# Patient Record
Sex: FEMALE | Race: WHITE | NOT HISPANIC OR LATINO | Employment: OTHER | URBAN - METROPOLITAN AREA
[De-identification: names, ages, dates, MRNs, and addresses within clinical notes are randomized per-mention and may not be internally consistent; named-entity substitution may affect disease eponyms.]

---

## 2019-12-05 ENCOUNTER — TRANSCRIBE ORDERS (OUTPATIENT)
Dept: ADMINISTRATIVE | Facility: HOSPITAL | Age: 64
End: 2019-12-05

## 2019-12-05 DIAGNOSIS — Z12.39 SCREENING BREAST EXAMINATION: Primary | ICD-10-CM

## 2020-01-07 ENCOUNTER — HOSPITAL ENCOUNTER (OUTPATIENT)
Dept: MAMMOGRAPHY | Facility: HOSPITAL | Age: 65
Discharge: HOME | End: 2020-01-07
Admitting: NURSE PRACTITIONER

## 2020-01-07 DIAGNOSIS — Z12.39 SCREENING BREAST EXAMINATION: ICD-10-CM

## 2020-01-07 PROCEDURE — 77067 SCR MAMMO BI INCL CAD: CPT

## 2020-01-07 PROCEDURE — 77063 BREAST TOMOSYNTHESIS BI: CPT

## 2021-09-16 ENCOUNTER — TRANSCRIBE ORDERS (OUTPATIENT)
Dept: ADMINISTRATIVE | Facility: HOSPITAL | Age: 66
End: 2021-09-16

## 2021-09-16 DIAGNOSIS — Z12.31 VISIT FOR SCREENING MAMMOGRAM: Primary | ICD-10-CM

## 2021-09-23 ENCOUNTER — HOSPITAL ENCOUNTER (OUTPATIENT)
Dept: MAMMOGRAPHY | Facility: HOSPITAL | Age: 66
Discharge: HOME | End: 2021-09-23
Admitting: NURSE PRACTITIONER

## 2021-09-23 DIAGNOSIS — Z12.31 VISIT FOR SCREENING MAMMOGRAM: ICD-10-CM

## 2021-09-23 PROCEDURE — 77067 SCR MAMMO BI INCL CAD: CPT

## 2021-09-23 PROCEDURE — 77063 BREAST TOMOSYNTHESIS BI: CPT

## 2022-09-12 ENCOUNTER — TRANSCRIBE ORDERS (OUTPATIENT)
Dept: ADMINISTRATIVE | Facility: HOSPITAL | Age: 67
End: 2022-09-12

## 2022-09-12 DIAGNOSIS — Z12.31 ENCOUNTER FOR SCREENING MAMMOGRAM FOR MALIGNANT NEOPLASM OF BREAST: Primary | ICD-10-CM

## 2022-09-26 ENCOUNTER — HOSPITAL ENCOUNTER (OUTPATIENT)
Dept: MAMMOGRAPHY | Facility: HOSPITAL | Age: 67
Discharge: HOME | End: 2022-09-26
Admitting: NURSE PRACTITIONER

## 2022-09-26 DIAGNOSIS — Z12.31 ENCOUNTER FOR SCREENING MAMMOGRAM FOR MALIGNANT NEOPLASM OF BREAST: ICD-10-CM

## 2022-09-26 PROCEDURE — 77067 SCR MAMMO BI INCL CAD: CPT

## 2022-09-26 PROCEDURE — 77063 BREAST TOMOSYNTHESIS BI: CPT

## 2024-11-14 ENCOUNTER — HOSPITAL ENCOUNTER (OUTPATIENT)
Dept: CT IMAGING | Facility: HOSPITAL | Age: 69
Discharge: HOME | End: 2024-11-14
Admitting: NURSE PRACTITIONER
Payer: MEDICARE

## 2024-11-14 ENCOUNTER — TRANSCRIBE ORDERS (OUTPATIENT)
Dept: ADMINISTRATIVE | Facility: HOSPITAL | Age: 69
End: 2024-11-14
Payer: MEDICARE

## 2024-11-14 DIAGNOSIS — R59.0 LYMPHADENOPATHY, AXILLARY: Primary | ICD-10-CM

## 2024-11-14 DIAGNOSIS — R59.0 LYMPHADENOPATHY, AXILLARY: ICD-10-CM

## 2024-11-14 LAB
CREAT BLDA-MCNC: 0.9 MG/DL (ref 0.6–1.3)
EGFRCR SERPLBLD CKD-EPI 2021: 69.3 ML/MIN/1.73

## 2024-11-14 PROCEDURE — 74178 CT ABD&PLV WO CNTR FLWD CNTR: CPT

## 2024-11-14 PROCEDURE — 82565 ASSAY OF CREATININE: CPT

## 2024-11-14 PROCEDURE — 25510000001 IOPAMIDOL PER 1 ML: Performed by: NURSE PRACTITIONER

## 2024-11-14 PROCEDURE — 71260 CT THORAX DX C+: CPT

## 2024-11-14 RX ORDER — IOPAMIDOL 755 MG/ML
100 INJECTION, SOLUTION INTRAVASCULAR
Status: COMPLETED | OUTPATIENT
Start: 2024-11-14 | End: 2024-11-14

## 2024-11-14 RX ADMIN — IOPAMIDOL 100 ML: 755 INJECTION, SOLUTION INTRAVENOUS at 14:22

## 2024-11-18 NOTE — PROGRESS NOTES
HEMATOLOGY ONCOLOGY OUTPATIENT CONSULTATION       Patient name: Catarina Mclean  : 1955  MRN: [id number]  Primary Care Physician: Bozena Alamo NP  Referring Physician: Bozena Alamo NP  Reason For Consult:       History of Present Illness:  Patient is a 69-year-old female who has been referred to us for further evaluation and management of diffuse lymphadenopathy.    She reported having symptoms of worsening fatigue, poor appetite intermittent night sweats and palpable axillary adenopathy approximately 2 months ago.  She was seen by her PCP for the symptoms and was empirically treated with oral antibiotics with limited improvement in her symptoms.      Bilateral breast screening mammogram in 2024 was reported unremarkable.  [BI-RADS 1]    Subsequently a CT chest abdomen pelvis were ordered and is reported as follows:  2024: CT chest/abdomen/pelvis:  Impression:  1.Supra diaphragmatic and infra diaphragmatic lymphadenopathy concerning for lymphoma.     Her past medical history significant for non-Hodgkin lymphoma [likely DLBCL], diagnosed in -10  She reported that she was being followed by Dr. Barkley and had systemic therapy for lymphoma in [id number].  She did not follow-up with oncologist thereafter and was lost to follow-up.  Patient is unable to provide any additional details about her diagnosis or treatment.    Limited prior medical records from  were reviewed:    Patient underwent bilateral tonsillectomy in 2009,  Pathology: Left tonsil was reported to have involvement from malignant lymphoma, large cell type of B-cell origin.  [Of activated B-cell phenotype].  IHC staining was reported positive for CD45, CD20, Bcl-2 and MUM1 with Ki-67 98%.  The tumor was negative for BEV.    A bone marrow biopsy was reported negative for lymphoma involvement at that time.    Patient stated that she is up-to-date whether age-appropriate cancer screening  "including annual screening mammograms, Pap smears and colonoscopies.  Last colonoscopy was approximately 10 years ago and was reported normal per patient.    Family history significant for unknown cancer in brother who has been recently diagnosed..    Subjective:  Patient presents for initial consultation today.  She reported having symptoms of generalized fatigue, poor appetite and palpable axillary adenopathy in right axilla and right supraclavicular area for last few weeks.  Denied any other respiratory or GI/ symptoms.  She reported having occasional night sweats but denied any weight loss or fever/chills.  No recent infections reported.      Past Medical History:   Diagnosis Date    Drug therapy        Past Surgical History:   Procedure Laterality Date    BREAST BIOPSY      HYSTERECTOMY         No current outpatient medications on file.    No Known Allergies    Family History   Problem Relation Age of Onset    Breast cancer Maternal Aunt        Cancer-related family history includes Breast cancer in her maternal aunt.         Social History     Social History Narrative    Not on file       ROS:   Review of Systems   Constitutional:  Positive for activity change, appetite change and fatigue.   HENT: Negative.     Eyes: Negative.    Respiratory: Negative.     Cardiovascular: Negative.    Gastrointestinal: Negative.    Endocrine: Negative.    Genitourinary: Negative.    Musculoskeletal: Negative.    Skin: Negative.    Allergic/Immunologic: Negative.    Neurological: Negative.    Hematological: Negative.    Psychiatric/Behavioral: Negative.           Objective:    Vital Signs:  Vitals:    11/20/24 0954   BP: 136/77   Pulse: 61   Temp: 97.9 °F (36.6 °C)   TempSrc: Oral   SpO2: 94%   Weight: 79.9 kg (176 lb 3.2 oz)   Height: 134.6 cm (52.99\")   PainSc: 0-No pain     Body mass index is 44.12 kg/m².    ECOG  (1) Restricted in physically strenuous activity, ambulatory and able to do work of light nature    Physical " "Exam:   Physical Exam  Constitutional:       Appearance: Normal appearance. She is normal weight.   HENT:      Head: Normocephalic and atraumatic.      Right Ear: External ear normal.      Left Ear: External ear normal.      Nose: Nose normal.      Mouth/Throat:      Mouth: Mucous membranes are moist.      Pharynx: Oropharynx is clear.   Eyes:      Extraocular Movements: Extraocular movements intact.      Conjunctiva/sclera: Conjunctivae normal.      Pupils: Pupils are equal, round, and reactive to light.   Cardiovascular:      Rate and Rhythm: Normal rate.      Pulses: Normal pulses.   Pulmonary:      Effort: Pulmonary effort is normal.   Abdominal:      General: Abdomen is flat.      Palpations: Abdomen is soft.   Musculoskeletal:         General: Normal range of motion.      Cervical back: Normal range of motion and neck supple.   Lymphadenopathy:      Upper Body:      Right upper body: Supraclavicular adenopathy and axillary adenopathy present.   Skin:     General: Skin is warm.   Neurological:      General: No focal deficit present.      Mental Status: She is alert and oriented to person, place, and time.   Psychiatric:         Mood and Affect: Mood normal.         Behavior: Behavior normal.         Thought Content: Thought content normal.         Judgment: Judgment normal.         Lab Results - Last 18 Months   Lab Units 11/20/24  1116   WBC 10*3/mm3 5.07   HEMOGLOBIN g/dL 13.4   HEMATOCRIT % 41.6   PLATELETS 10*3/mm3 153   MCV fL 87.9     Lab Results - Last 18 Months   Lab Units 11/14/24  1320   CREATININE mg/dL 0.90       Lab Results   Component Value Date    CREATININE 0.90 11/14/2024       No results for input(s): \"APTT\", \"INR\", \"PTT\" in the last 47661 hours.    No results found for: \"IRON\", \"TIBC\", \"FERRITIN\"    No results found for: \"FOLATE\"    No results found for: \"OCCULTBLD\"    No results found for: \"RETICCTPCT\"  No results found for: \"FUBLTBGA45\"  No results found for: \"SPEP\", \"UPEP\"  No results " "found for: \"LDH\", \"URICACID\"  No results found for: \"OTTONIEL\", \"RF\", \"SEDRATE\"  No results found for: \"FIBRINOGEN\", \"HAPTOGLOBIN\"  No results found for: \"PTT\", \"INR\"  No results found for: \"\"  No results found for: \"CEA\"  No components found for: \"CA-19-9\"  No results found for: \"PSA\"  No results found for: \"SEDRATE\"       Assessment & Plan     Generalized lymphadenopathy:  History of non-Hodgkin lymphoma:  -Medical records reviewed as above.  Patient has remote history of aggressive NHL, status post chemotherapy in 2009-10.  -CT chest/abdomen/pelvis reviewed, noted to have extensive lymphadenopathy involving supraclavicular, axillary and intra-abdominal lymph nodes.  No solid organ masses noted concerning for solid organ metastasis.  -Discussed with patient regarding likely diagnosis of lymphoma given her medical history and further steps in management.  -Will check labs today.  Ordered PET scan for further evaluation.  -Patient is scheduled for axillary lymph node biopsy tomorrow.  Will await further results.      2-week follow-up with biopsy and imaging findings.  Sooner as needed.      Thank you very much for providing the opportunity to participate in this patient’s care. Please do not hesitate to call if there are any other questions.    "

## 2024-11-20 ENCOUNTER — CONSULT (OUTPATIENT)
Dept: ONCOLOGY | Facility: CLINIC | Age: 69
End: 2024-11-20
Payer: MEDICARE

## 2024-11-20 ENCOUNTER — LAB (OUTPATIENT)
Dept: LAB | Facility: HOSPITAL | Age: 69
End: 2024-11-20
Payer: MEDICARE

## 2024-11-20 VITALS
HEART RATE: 61 BPM | BODY MASS INDEX: 40.78 KG/M2 | HEIGHT: 55 IN | SYSTOLIC BLOOD PRESSURE: 136 MMHG | WEIGHT: 176.2 LBS | DIASTOLIC BLOOD PRESSURE: 77 MMHG | OXYGEN SATURATION: 94 % | TEMPERATURE: 97.9 F

## 2024-11-20 DIAGNOSIS — R59.0 AXILLARY LYMPHADENOPATHY: ICD-10-CM

## 2024-11-20 DIAGNOSIS — C83.31 DIFFUSE LARGE B-CELL LYMPHOMA, LYMPH NODES OF HEAD, FACE, AND NECK: ICD-10-CM

## 2024-11-20 DIAGNOSIS — R59.1 LYMPHADENOPATHY: ICD-10-CM

## 2024-11-20 DIAGNOSIS — K75.9 HEPATITIS: ICD-10-CM

## 2024-11-20 DIAGNOSIS — Z85.72 HISTORY OF NON-HODGKIN'S LYMPHOMA: ICD-10-CM

## 2024-11-20 DIAGNOSIS — R59.1 LYMPHADENOPATHY: Primary | ICD-10-CM

## 2024-11-20 LAB
ALBUMIN SERPL-MCNC: 4.3 G/DL (ref 3.5–5.2)
ALBUMIN/GLOB SERPL: 1.5 G/DL
ALP SERPL-CCNC: 88 U/L (ref 39–117)
ALT SERPL W P-5'-P-CCNC: 15 U/L (ref 1–33)
ANION GAP SERPL CALCULATED.3IONS-SCNC: 10.5 MMOL/L (ref 5–15)
AST SERPL-CCNC: 24 U/L (ref 1–32)
BASOPHILS # BLD AUTO: 0.03 10*3/MM3 (ref 0–0.2)
BASOPHILS NFR BLD AUTO: 0.6 % (ref 0–1.5)
BILIRUB SERPL-MCNC: 0.8 MG/DL (ref 0–1.2)
BUN SERPL-MCNC: 8 MG/DL (ref 8–23)
BUN/CREAT SERPL: 8.8 (ref 7–25)
CALCIUM SPEC-SCNC: 10.7 MG/DL (ref 8.6–10.5)
CHLORIDE SERPL-SCNC: 102 MMOL/L (ref 98–107)
CO2 SERPL-SCNC: 27.5 MMOL/L (ref 22–29)
CREAT SERPL-MCNC: 0.91 MG/DL (ref 0.57–1)
DEPRECATED RDW RBC AUTO: 42.3 FL (ref 37–54)
EGFRCR SERPLBLD CKD-EPI 2021: 68.4 ML/MIN/1.73
EOSINOPHIL # BLD AUTO: 0.14 10*3/MM3 (ref 0–0.4)
EOSINOPHIL NFR BLD AUTO: 2.8 % (ref 0.3–6.2)
ERYTHROCYTE [DISTWIDTH] IN BLOOD BY AUTOMATED COUNT: 13.6 % (ref 12.3–15.4)
ERYTHROCYTE [SEDIMENTATION RATE] IN BLOOD: 8 MM/HR (ref 0–30)
GLOBULIN UR ELPH-MCNC: 2.9 GM/DL
GLUCOSE SERPL-MCNC: 95 MG/DL (ref 65–99)
HAV IGM SERPL QL IA: NORMAL
HBV CORE IGM SERPL QL IA: NORMAL
HBV SURFACE AG SERPL QL IA: NORMAL
HCT VFR BLD AUTO: 41.6 % (ref 34–46.6)
HCV AB SER QL: NORMAL
HGB BLD-MCNC: 13.4 G/DL (ref 12–15.9)
LDH SERPL-CCNC: 310 U/L (ref 135–214)
LYMPHOCYTES # BLD AUTO: 1.16 10*3/MM3 (ref 0.7–3.1)
LYMPHOCYTES NFR BLD AUTO: 22.9 % (ref 19.6–45.3)
MCH RBC QN AUTO: 28.3 PG (ref 26.6–33)
MCHC RBC AUTO-ENTMCNC: 32.2 G/DL (ref 31.5–35.7)
MCV RBC AUTO: 87.9 FL (ref 79–97)
MONOCYTES # BLD AUTO: 0.54 10*3/MM3 (ref 0.1–0.9)
MONOCYTES NFR BLD AUTO: 10.7 % (ref 5–12)
NEUTROPHILS NFR BLD AUTO: 3.2 10*3/MM3 (ref 1.7–7)
NEUTROPHILS NFR BLD AUTO: 63 % (ref 42.7–76)
PLATELET # BLD AUTO: 153 10*3/MM3 (ref 140–450)
PMV BLD AUTO: 10.2 FL (ref 6–12)
POTASSIUM SERPL-SCNC: 4.1 MMOL/L (ref 3.5–5.2)
PROT SERPL-MCNC: 7.2 G/DL (ref 6–8.5)
RBC # BLD AUTO: 4.73 10*6/MM3 (ref 3.77–5.28)
SODIUM SERPL-SCNC: 140 MMOL/L (ref 136–145)
WBC NRBC COR # BLD AUTO: 5.07 10*3/MM3 (ref 3.4–10.8)

## 2024-11-20 PROCEDURE — 80053 COMPREHEN METABOLIC PANEL: CPT | Performed by: STUDENT IN AN ORGANIZED HEALTH CARE EDUCATION/TRAINING PROGRAM

## 2024-11-20 PROCEDURE — 83615 LACTATE (LD) (LDH) ENZYME: CPT | Performed by: STUDENT IN AN ORGANIZED HEALTH CARE EDUCATION/TRAINING PROGRAM

## 2024-11-20 PROCEDURE — 85652 RBC SED RATE AUTOMATED: CPT | Performed by: STUDENT IN AN ORGANIZED HEALTH CARE EDUCATION/TRAINING PROGRAM

## 2024-11-20 PROCEDURE — 80074 ACUTE HEPATITIS PANEL: CPT | Performed by: STUDENT IN AN ORGANIZED HEALTH CARE EDUCATION/TRAINING PROGRAM

## 2024-11-20 PROCEDURE — 36415 COLL VENOUS BLD VENIPUNCTURE: CPT

## 2024-11-20 PROCEDURE — 85025 COMPLETE CBC W/AUTO DIFF WBC: CPT

## 2024-11-20 RX ORDER — TRAZODONE HYDROCHLORIDE 50 MG/1
50 TABLET, FILM COATED ORAL NIGHTLY
COMMUNITY
Start: 2024-11-08

## 2024-11-20 RX ORDER — PRAVASTATIN SODIUM 20 MG
20 TABLET ORAL DAILY
COMMUNITY
Start: 2024-10-22

## 2024-11-20 RX ORDER — AMLODIPINE BESYLATE 5 MG/1
5 TABLET ORAL DAILY
COMMUNITY
Start: 2024-10-31

## 2024-11-20 RX ORDER — AMOXICILLIN 875 MG/1
TABLET, COATED ORAL
COMMUNITY
Start: 2024-10-17 | End: 2024-11-21

## 2024-11-20 RX ORDER — LISINOPRIL 20 MG/1
20 TABLET ORAL DAILY
COMMUNITY
Start: 2024-10-31

## 2024-11-20 RX ORDER — ASPIRIN 81 MG/1
TABLET, CHEWABLE ORAL DAILY
COMMUNITY

## 2024-11-21 ENCOUNTER — HOSPITAL ENCOUNTER (OUTPATIENT)
Dept: CT IMAGING | Facility: HOSPITAL | Age: 69
Discharge: HOME | End: 2024-11-21
Admitting: NURSE PRACTITIONER
Payer: MEDICARE

## 2024-11-21 VITALS — HEIGHT: 63 IN | WEIGHT: 170 LBS | BODY MASS INDEX: 30.12 KG/M2

## 2024-11-21 DIAGNOSIS — R59.0 LYMPHADENOPATHY, AXILLARY: ICD-10-CM

## 2024-11-21 LAB
ANA HOMOGEN TITR SER: ABNORMAL {TITER}
ANA SER QL IF: POSITIVE
CMV IGG SERPL IA-ACNC: >10 U/ML (ref 0–0.59)
CMV IGM SERPL IA-ACNC: <30 AU/ML (ref 0–29.9)
EBV VCA IGG SER IA-ACNC: 245 U/ML (ref 0–17.9)
EBV VCA IGM SER IA-ACNC: <36 U/ML (ref 0–35.9)
LAB AP CASE REPORT: NORMAL
Lab: ABNORMAL
Lab: NORMAL

## 2024-11-21 PROCEDURE — 77012 CT SCAN FOR NEEDLE BIOPSY: CPT

## 2024-11-21 PROCEDURE — 25010000002 LIDOCAINE 1 % SOLUTION

## 2024-11-21 RX ORDER — LIDOCAINE HYDROCHLORIDE 10 MG/ML
INJECTION, SOLUTION INFILTRATION; PERINEURAL AS NEEDED
Status: COMPLETED | OUTPATIENT
Start: 2024-11-21 | End: 2024-11-21

## 2024-11-21 RX ORDER — CHLORAL HYDRATE 500 MG
2000 CAPSULE ORAL
COMMUNITY

## 2024-11-21 RX ADMIN — LIDOCAINE HYDROCHLORIDE 10 ML: 10 INJECTION, SOLUTION INFILTRATION; PERINEURAL at 13:34

## 2024-11-21 NOTE — DISCHARGE INSTRUCTIONS
KEEP DRESSING ON FOR THE NEXT 48 HOURS. YOU MAY THEN REMOVE IT AND SHOWER AS USUAL. NO HEAVY LIFTING GREATER THAN 10 POUNDS FOR THE NEXT 24 HOURS. WATCH FOR SIGNS AND SYMPTOMS OF INFECTION SUCH AS REDNESS, SWELLING, DRAINAGE, OR UNEXPLAINED FEVER. IF ANY SYMPTOMS ARISE. SEE YOUR PHYSICIAN. FOLLOW UP WITH NITIN PATTON,  FOR YOUR BIOPSY RESULTS IN ABOUT A WEEK. ANY QUESTIONS, YOU CAN CALL THE [facility] -[phone], M-F 8-4:30. IF AFTER HOURS, FOLLOW PROMPTS ON PHONE.    IF YOU DEVELOP ANY PAIN OR DISCOMFORT WHILE AT HOME TODAY, YOU MAY APPLY AN ICE PACK, WITH A BARRIER BETWEEN THE SKIN AND THE ICE, FOR 20 MINUTES ON AND THEN 20 MINUTES OFF. YOU MAY ALSO TAKE SOME EXTRA STRENGTH TYLENOL IF NEED BE.

## 2024-11-22 LAB
GAMMA INTERFERON BACKGROUND BLD IA-ACNC: 0.02 IU/ML
Lab: NORMAL
Lab: NORMAL
M TB IFN-G BLD-IMP: NEGATIVE
M TB IFN-G CD4+ BCKGRND COR BLD-ACNC: 0.06 IU/ML
M TB IFN-G CD4+CD8+ BCKGRND COR BLD-ACNC: 0.05 IU/ML
MITOGEN IGNF BCKGRD COR BLD-ACNC: >10 IU/ML
QUANTIFERON INCUBATION: NORMAL
SERVICE CMNT-IMP: NORMAL

## 2024-11-25 LAB — REF LAB TEST METHOD: NORMAL

## 2024-11-27 LAB
AGGRESSIVE B-CELL LYMPHOMA TARGETGENE PANEL RESULT: NORMAL
CONSULT RESULT: NORMAL
TARGETGENE RESULT: NORMAL

## 2024-12-02 LAB
LAB AP CASE REPORT: NORMAL
LAB AP FLOW CYTOMETRY SUMMARY: NORMAL
Lab: NORMAL
PATH REPORT.FINAL DX SPEC: NORMAL
PATH REPORT.GROSS SPEC: NORMAL

## 2024-12-03 ENCOUNTER — HOSPITAL ENCOUNTER (OUTPATIENT)
Dept: PET IMAGING | Facility: HOSPITAL | Age: 69
Discharge: HOME | End: 2024-12-03
Payer: MEDICARE

## 2024-12-03 DIAGNOSIS — R59.0 AXILLARY LYMPHADENOPATHY: ICD-10-CM

## 2024-12-03 DIAGNOSIS — K75.9 HEPATITIS: ICD-10-CM

## 2024-12-03 DIAGNOSIS — R59.1 LYMPHADENOPATHY: ICD-10-CM

## 2024-12-03 DIAGNOSIS — Z85.72 HISTORY OF NON-HODGKIN'S LYMPHOMA: ICD-10-CM

## 2024-12-03 DIAGNOSIS — C83.31 DIFFUSE LARGE B-CELL LYMPHOMA, LYMPH NODES OF HEAD, FACE, AND NECK: ICD-10-CM

## 2024-12-03 LAB — GLUCOSE BLDC GLUCOMTR-MCNC: 107 MG/DL (ref 70–105)

## 2024-12-03 PROCEDURE — A9552 F18 FDG: HCPCS | Performed by: STUDENT IN AN ORGANIZED HEALTH CARE EDUCATION/TRAINING PROGRAM

## 2024-12-03 PROCEDURE — 34310000005 FLUDEOXYGLUCOSE F18 SOLUTION: Performed by: STUDENT IN AN ORGANIZED HEALTH CARE EDUCATION/TRAINING PROGRAM

## 2024-12-03 PROCEDURE — 78815 PET IMAGE W/CT SKULL-THIGH: CPT

## 2024-12-03 PROCEDURE — 82948 REAGENT STRIP/BLOOD GLUCOSE: CPT

## 2024-12-03 RX ADMIN — FLUDEOXYGLUCOSE F 18 1 DOSE: 200 INJECTION, SOLUTION INTRAVENOUS at 12:21

## 2024-12-06 NOTE — PROGRESS NOTES
HEMATOLOGY ONCOLOGY OUTPATIENT CONSULTATION       Patient name: Catarina Mclean  : 1955  MRN: [id number]  Primary Care Physician: Bozena Alamo NP  Referring Physician: Bozena Alamo NP  Reason For Consult:       History of Present Illness:  Patient is a 69-year-old female who has been referred to us for further evaluation and management of diffuse lymphadenopathy.    She reported having symptoms of worsening fatigue, poor appetite intermittent night sweats and palpable axillary adenopathy approximately 2 months ago.  She was seen by her PCP for the symptoms and was empirically treated with oral antibiotics with limited improvement in her symptoms.      Bilateral breast screening mammogram in 2024 was reported unremarkable.  [BI-RADS 1]    Subsequently a CT chest abdomen pelvis were ordered and is reported as follows:  2024: CT chest/abdomen/pelvis:  Impression:  1.Supra diaphragmatic and infra diaphragmatic lymphadenopathy concerning for lymphoma.     Her past medical history significant for non-Hodgkin lymphoma [likely DLBCL], diagnosed in -10  She reported that she was being followed by Dr. Barkley and had systemic therapy for lymphoma in [id number].  She did not follow-up with oncologist thereafter and was lost to follow-up.  Patient is unable to provide any additional details about her diagnosis or treatment.    Limited prior medical records from  were reviewed:    Patient underwent bilateral tonsillectomy in 2009,  Pathology: Left tonsil was reported to have involvement from malignant lymphoma, large cell type of B-cell origin.  [Of activated B-cell phenotype].  IHC staining was reported positive for CD45, CD20, Bcl-2 and MUM1 with Ki-67 98%.  The tumor was negative for BEV.    A bone marrow biopsy was reported negative for lymphoma involvement at that time.    Patient stated that she is up-to-date whether age-appropriate cancer screening  including annual screening mammograms, Pap smears and colonoscopies.  Last colonoscopy was approximately 10 years ago and was reported normal per patient.    Family history significant for unknown cancer in brother who has been recently diagnosed..      Patient presents for initial consultation today.  She reported having symptoms of generalized fatigue, poor appetite and palpable axillary adenopathy in right axilla and right supraclavicular area for last few weeks.  Denied any other respiratory or GI/ symptoms.  She reported having occasional night sweats but denied any weight loss or fever/chills.  No recent infections reported.    11/20/24: Lymph node, right axillary:  Large B-cell lymphoma  Immunohistochemistry  There is predominance of intermediate to large sized CD45 and CD20+ B-lymphocytes. CD3 and CD5 reveal numerous small T-lymphocytes. B-cells are negative for CD5, CD10, CD15, CD30, and BEV. They are positive for PAX-5, BCL-2, BCL-6, and MUM-1, as well as c-MYC (~40% of neoplastic cells). Ki-67 shows high proliferative rate (50-60%).    Flow Cytometry: Abnormal/ monotypic B-cell population (4% of sample)  Comment: Scatter properties compatible with increased cell size, cells characterized as:  CD45+, CD19+, CD20+, CD5-, CD10-, CD23-, FMC7-, CD30-, CD38-, CD43-/+, HLA  DR+, sIg lambda+    FISH PANEL: Abnormal Lymphoma FISH panel  Aneuploidy: gain of BCL6/3q, MYC/8q, and BCL2/18q  Additional IGH/14q    12/3/24: PET/CT:  Impression:  1. Hypermetabolic left cervical chain, bilateral supraclavicular, right axillary, right subpectoral, portacaval and retroperitoneal adenopathy consistent with known lymphoma.  2. Two small hypermetabolic splenic lesions likely also related to lymphoma. No splenomegaly.  3. Hypermetabolic osseous uptake associated with C3 vertebral body and right posterior 12th rib likely osseous involvement of lymphoma.  4. Additional incidental CT findings above.    Subjective:  12/10/24:  Patient seen today for follow-up visit to discuss her biopsy and imaging results.  She reported again having ongoing fatigue and decreased appetite.  Denied any new complaints today.    Past Medical History:   Diagnosis Date    Drug therapy     Hyperlipidemia     Hypertension     Non Hodgkin's lymphoma 2009       Past Surgical History:   Procedure Laterality Date    BREAST BIOPSY      HYSTERECTOMY           Current Outpatient Medications:     amLODIPine (NORVASC) 5 MG tablet, Take 1 tablet by mouth Daily., Disp: , Rfl:     aspirin 81 MG chewable tablet, Chew Daily., Disp: , Rfl:     lisinopril (PRINIVIL,ZESTRIL) 20 MG tablet, Take 1 tablet by mouth Daily., Disp: , Rfl:     Omega-3 Fatty Acids (fish oil) 1000 MG capsule capsule, Take 2 capsules by mouth Daily With Breakfast., Disp: , Rfl:     pravastatin (PRAVACHOL) 20 MG tablet, Take 1 tablet by mouth Daily., Disp: , Rfl:     traZODone (DESYREL) 50 MG tablet, Take 1 tablet by mouth Every Night., Disp: , Rfl:     No Known Allergies    Family History   Problem Relation Age of Onset    Liver cancer Brother     Breast cancer Maternal Aunt        Cancer-related family history includes Breast cancer in her maternal aunt; Liver cancer in her brother.      Social History     Tobacco Use    Smoking status: Never   Vaping Use    Vaping status: Never Used   Substance Use Topics    Alcohol use: Yes     Alcohol/week: 10.0 standard drinks of alcohol     Types: 10 Cans of beer per week     Comment: WEEKLY    Drug use: Never     Social History     Social History Narrative    Not on file       ROS:   Review of Systems   Constitutional:  Positive for activity change, appetite change and fatigue.   HENT: Negative.     Eyes: Negative.    Respiratory: Negative.     Cardiovascular: Negative.    Gastrointestinal: Negative.    Endocrine: Negative.    Genitourinary: Negative.    Musculoskeletal: Negative.    Skin: Negative.    Allergic/Immunologic: Negative.    Neurological: Negative.     Hematological: Negative.    Psychiatric/Behavioral: Negative.           Objective:    Vital Signs:  There were no vitals filed for this visit.    There is no height or weight on file to calculate BMI.    ECOG  (1) Restricted in physically strenuous activity, ambulatory and able to do work of light nature    Physical Exam:   Physical Exam  Constitutional:       Appearance: Normal appearance. She is normal weight.   HENT:      Head: Normocephalic and atraumatic.      Right Ear: External ear normal.      Left Ear: External ear normal.      Nose: Nose normal.      Mouth/Throat:      Mouth: Mucous membranes are moist.      Pharynx: Oropharynx is clear.   Eyes:      Extraocular Movements: Extraocular movements intact.      Conjunctiva/sclera: Conjunctivae normal.      Pupils: Pupils are equal, round, and reactive to light.   Cardiovascular:      Rate and Rhythm: Normal rate.      Pulses: Normal pulses.   Pulmonary:      Effort: Pulmonary effort is normal.   Abdominal:      General: Abdomen is flat.      Palpations: Abdomen is soft.   Musculoskeletal:         General: Normal range of motion.      Cervical back: Normal range of motion and neck supple.   Lymphadenopathy:      Upper Body:      Right upper body: Supraclavicular adenopathy and axillary adenopathy present.   Skin:     General: Skin is warm.   Neurological:      General: No focal deficit present.      Mental Status: She is alert and oriented to person, place, and time.   Psychiatric:         Mood and Affect: Mood normal.         Behavior: Behavior normal.         Thought Content: Thought content normal.         Judgment: Judgment normal.         Lab Results - Last 18 Months   Lab Units 11/20/24  1116   WBC 10*3/mm3 5.07   HEMOGLOBIN g/dL 13.4   HEMATOCRIT % 41.6   PLATELETS 10*3/mm3 153   MCV fL 87.9     Lab Results - Last 18 Months   Lab Units 11/20/24  1116 11/14/24  1320   SODIUM mmol/L 140  --    POTASSIUM mmol/L 4.1  --    CHLORIDE mmol/L 102  --    CO2  "mmol/L 27.5  --    BUN mg/dL 8  --    CREATININE mg/dL 0.91 0.90   CALCIUM mg/dL 10.7*  --    BILIRUBIN mg/dL 0.8  --    ALK PHOS U/L 88  --    ALT (SGPT) U/L 15  --    AST (SGOT) U/L 24  --    GLUCOSE mg/dL 95  --        Lab Results   Component Value Date    GLUCOSE 95 11/20/2024    BUN 8 11/20/2024    CREATININE 0.91 11/20/2024    BCR 8.8 11/20/2024    K 4.1 11/20/2024    CO2 27.5 11/20/2024    CALCIUM 10.7 (H) 11/20/2024    ALBUMIN 4.3 11/20/2024    AST 24 11/20/2024    ALT 15 11/20/2024       No results for input(s): \"APTT\", \"INR\", \"PTT\" in the last 29026 hours.    No results found for: \"IRON\", \"TIBC\", \"FERRITIN\"    No results found for: \"FOLATE\"    No results found for: \"OCCULTBLD\"    No results found for: \"RETICCTPCT\"  No results found for: \"RWEGMWIV31\"  No results found for: \"SPEP\", \"UPEP\"  LDH   Date Value Ref Range Status   11/20/2024 310 (H) 135 - 214 U/L Final     Lab Results   Component Value Date    OTTONIEL Positive (A) 11/20/2024    SEDRATE 8 11/20/2024     No results found for: \"FIBRINOGEN\", \"HAPTOGLOBIN\"  No results found for: \"PTT\", \"INR\"  No results found for: \"\"  No results found for: \"CEA\"  No components found for: \"CA-19-9\"  No results found for: \"PSA\"  Lab Results   Component Value Date    SEDRATE 8 11/20/2024          Assessment & Plan     Generalized lymphadenopathy:  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  Patient has remote history of aggressive NHL, status post chemotherapy in 2009-10.  -CT chest/abdomen/pelvis reviewed, noted to have extensive lymphadenopathy involving supraclavicular, axillary and intra-abdominal lymph nodes.  No solid organ masses noted concerning for solid organ metastasis.  -Axillary lymph node biopsy and PET/CT findings reviewed as above.  Noted to have extensive lymphadenopathy involving cervical, right axillary and retroperitoneal adenopathy on PET/CT  -Biopsy findings are positive for large cell lymphoma with Bcl-2/BCL6/MYC rearrangements " positive consistent with triple hit lymphoma.  Ki-67 is elevated at 50-60%  -I reviewed these findings with patient in detail.  Given extensive disease and aggressive disease biology, she will benefit from starting systemic therapy ASAP.  Will plan to start her on dose adjusted R-EPOCH regimen in next 7-10 days.  She will also need CNS prophylaxis  with IT methotrexate with systemic therapy given high risk for CNS involvement [CNS IPI 4, associated with high risk of CNS involvement].  -Hepatitis panel reported normal  -Will start systemic therapy in the inpatient setting.     Cardiac health: Detailed medical records pertaining to her prior cancer treatment are not available, however it seems she had outpatient chemotherapy with R-CHOP or similar regimen containing anthracyclines..  Will try to obtain these records but there is significant concern about patient crossing the maximum recommended lifetime dose for anthracyclines with her planned lymphoma treatment.  -Discussed her case with cardiology [Dr. García]..  Will refer her to cardiology clinic to discuss cardiac risk reduction and to start prophylactic medications [beta-blocker/ACE inhibitor's etc.].  -Will obtain an echocardiogram prior to starting treatment.      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  Will continue throughout treatment    ID: Started patient on antiviral and PCP prophylaxis with acyclovir and Bactrim.    Will plan for inpatient admission for chemotherapy in 1 week. Check twice weekly labs during off weeks.  2-week outpatient follow-up.    I spent 45 minutes caring for the patient on this date of service. This time includes time spent by me in the following activities:preparing for the visit, reviewing tests, obtaining and/or reviewing a separately obtained history, performing a medically appropriate examination and/or evaluation , counseling and educating the patient/family/caregiver, ordering  medications, tests, or procedures, documenting information in the medical record, and independently interpreting results and communicating that information with the patient/family/caregiver         Thank you very much for providing the opportunity to participate in this patient’s care. Please do not hesitate to call if there are any other questions.

## 2024-12-09 LAB — CCV RESULT: NORMAL

## 2024-12-10 ENCOUNTER — OFFICE VISIT (OUTPATIENT)
Dept: ONCOLOGY | Facility: CLINIC | Age: 69
End: 2024-12-10
Payer: MEDICARE

## 2024-12-10 ENCOUNTER — LAB (OUTPATIENT)
Dept: LAB | Facility: HOSPITAL | Age: 69
End: 2024-12-10
Payer: MEDICARE

## 2024-12-10 ENCOUNTER — TELEPHONE (OUTPATIENT)
Dept: ONCOLOGY | Facility: CLINIC | Age: 69
End: 2024-12-10
Payer: MEDICARE

## 2024-12-10 ENCOUNTER — APPOINTMENT (OUTPATIENT)
Dept: LAB | Facility: HOSPITAL | Age: 69
End: 2024-12-10
Payer: MEDICARE

## 2024-12-10 VITALS
DIASTOLIC BLOOD PRESSURE: 93 MMHG | SYSTOLIC BLOOD PRESSURE: 169 MMHG | WEIGHT: 177 LBS | BODY MASS INDEX: 31.36 KG/M2 | OXYGEN SATURATION: 98 % | HEIGHT: 63 IN | HEART RATE: 65 BPM

## 2024-12-10 DIAGNOSIS — K75.9 HEPATITIS: ICD-10-CM

## 2024-12-10 DIAGNOSIS — C83.31 DIFFUSE LARGE B-CELL LYMPHOMA, LYMPH NODES OF HEAD, FACE, AND NECK: ICD-10-CM

## 2024-12-10 DIAGNOSIS — Z85.72 HISTORY OF NON-HODGKIN'S LYMPHOMA: ICD-10-CM

## 2024-12-10 DIAGNOSIS — C83.31 DIFFUSE LARGE B-CELL LYMPHOMA, LYMPH NODES OF HEAD, FACE, AND NECK: Primary | ICD-10-CM

## 2024-12-10 DIAGNOSIS — R59.1 LYMPHADENOPATHY: ICD-10-CM

## 2024-12-10 DIAGNOSIS — R59.0 AXILLARY LYMPHADENOPATHY: ICD-10-CM

## 2024-12-10 DIAGNOSIS — Z51.11 ENCOUNTER FOR ANTINEOPLASTIC CHEMOTHERAPY: ICD-10-CM

## 2024-12-10 LAB
ALBUMIN SERPL-MCNC: 4.5 G/DL (ref 3.5–5.2)
ALBUMIN/GLOB SERPL: 1.7 G/DL
ALP SERPL-CCNC: 91 U/L (ref 39–117)
ALT SERPL W P-5'-P-CCNC: 16 U/L (ref 1–33)
ANION GAP SERPL CALCULATED.3IONS-SCNC: 9.9 MMOL/L (ref 5–15)
AST SERPL-CCNC: 25 U/L (ref 1–32)
B2 MICROGLOB SERPL-MCNC: 2.9 MG/L (ref 0.8–2.2)
BASOPHILS # BLD AUTO: 0.04 10*3/MM3 (ref 0–0.2)
BASOPHILS NFR BLD AUTO: 0.9 % (ref 0–1.5)
BILIRUB SERPL-MCNC: 1.2 MG/DL (ref 0–1.2)
BUN SERPL-MCNC: 8 MG/DL (ref 8–23)
BUN/CREAT SERPL: 8.4 (ref 7–25)
CALCIUM SPEC-SCNC: 10.5 MG/DL (ref 8.6–10.5)
CHLORIDE SERPL-SCNC: 102 MMOL/L (ref 98–107)
CO2 SERPL-SCNC: 28.1 MMOL/L (ref 22–29)
CREAT SERPL-MCNC: 0.95 MG/DL (ref 0.57–1)
DEPRECATED RDW RBC AUTO: 43.3 FL (ref 37–54)
EGFRCR SERPLBLD CKD-EPI 2021: 65 ML/MIN/1.73
EOSINOPHIL # BLD AUTO: 0.09 10*3/MM3 (ref 0–0.4)
EOSINOPHIL NFR BLD AUTO: 1.9 % (ref 0.3–6.2)
ERYTHROCYTE [DISTWIDTH] IN BLOOD BY AUTOMATED COUNT: 14 % (ref 12.3–15.4)
ERYTHROCYTE [SEDIMENTATION RATE] IN BLOOD: 4 MM/HR (ref 0–30)
GLOBULIN UR ELPH-MCNC: 2.7 GM/DL
GLUCOSE SERPL-MCNC: 109 MG/DL (ref 65–99)
HAV IGM SERPL QL IA: NORMAL
HBV CORE IGM SERPL QL IA: NORMAL
HBV SURFACE AG SERPL QL IA: NORMAL
HCT VFR BLD AUTO: 41.4 % (ref 34–46.6)
HCV AB SER QL: NORMAL
HGB BLD-MCNC: 13.2 G/DL (ref 12–15.9)
LDH SERPL-CCNC: 322 U/L (ref 135–214)
LYMPHOCYTES # BLD AUTO: 1.13 10*3/MM3 (ref 0.7–3.1)
LYMPHOCYTES NFR BLD AUTO: 24.1 % (ref 19.6–45.3)
MCH RBC QN AUTO: 27.8 PG (ref 26.6–33)
MCHC RBC AUTO-ENTMCNC: 31.9 G/DL (ref 31.5–35.7)
MCV RBC AUTO: 87.2 FL (ref 79–97)
MONOCYTES # BLD AUTO: 0.49 10*3/MM3 (ref 0.1–0.9)
MONOCYTES NFR BLD AUTO: 10.5 % (ref 5–12)
NEUTROPHILS NFR BLD AUTO: 2.93 10*3/MM3 (ref 1.7–7)
NEUTROPHILS NFR BLD AUTO: 62.6 % (ref 42.7–76)
PLATELET # BLD AUTO: 132 10*3/MM3 (ref 140–450)
PMV BLD AUTO: 9.4 FL (ref 6–12)
POTASSIUM SERPL-SCNC: 4.2 MMOL/L (ref 3.5–5.2)
PROT SERPL-MCNC: 7.2 G/DL (ref 6–8.5)
RBC # BLD AUTO: 4.75 10*6/MM3 (ref 3.77–5.28)
SODIUM SERPL-SCNC: 140 MMOL/L (ref 136–145)
URATE SERPL-MCNC: 4.6 MG/DL (ref 2.4–5.7)
WBC NRBC COR # BLD AUTO: 4.68 10*3/MM3 (ref 3.4–10.8)

## 2024-12-10 PROCEDURE — 85025 COMPLETE CBC W/AUTO DIFF WBC: CPT

## 2024-12-10 PROCEDURE — 80053 COMPREHEN METABOLIC PANEL: CPT | Performed by: STUDENT IN AN ORGANIZED HEALTH CARE EDUCATION/TRAINING PROGRAM

## 2024-12-10 PROCEDURE — 83615 LACTATE (LD) (LDH) ENZYME: CPT | Performed by: STUDENT IN AN ORGANIZED HEALTH CARE EDUCATION/TRAINING PROGRAM

## 2024-12-10 PROCEDURE — 85652 RBC SED RATE AUTOMATED: CPT | Performed by: STUDENT IN AN ORGANIZED HEALTH CARE EDUCATION/TRAINING PROGRAM

## 2024-12-10 PROCEDURE — 80074 ACUTE HEPATITIS PANEL: CPT | Performed by: STUDENT IN AN ORGANIZED HEALTH CARE EDUCATION/TRAINING PROGRAM

## 2024-12-10 PROCEDURE — 82232 ASSAY OF BETA-2 PROTEIN: CPT | Performed by: STUDENT IN AN ORGANIZED HEALTH CARE EDUCATION/TRAINING PROGRAM

## 2024-12-10 PROCEDURE — 36415 COLL VENOUS BLD VENIPUNCTURE: CPT

## 2024-12-10 PROCEDURE — 84550 ASSAY OF BLOOD/URIC ACID: CPT | Performed by: STUDENT IN AN ORGANIZED HEALTH CARE EDUCATION/TRAINING PROGRAM

## 2024-12-10 RX ORDER — SULFAMETHOXAZOLE AND TRIMETHOPRIM 800; 160 MG/1; MG/1
1 TABLET ORAL
Qty: 15 TABLET | Refills: 5 | Status: SHIPPED | OUTPATIENT
Start: 2024-12-10

## 2024-12-10 RX ORDER — ALLOPURINOL 300 MG/1
300 TABLET ORAL DAILY
Qty: 30 TABLET | Refills: 0 | Status: SHIPPED | OUTPATIENT
Start: 2024-12-10

## 2024-12-10 RX ORDER — ACYCLOVIR 400 MG/1
400 TABLET ORAL 2 TIMES DAILY
Qty: 60 TABLET | Refills: 5 | Status: SHIPPED | OUTPATIENT
Start: 2024-12-10

## 2024-12-10 NOTE — TELEPHONE ENCOUNTER
Spoke w/ pt and let her know that she has been scheduled for her echo on Monday 12/16/24 at 9:30 am.  Pt given address of cardiology office on [address]  Pt v/u.  I let pt know that I will call and update her on the chemo admission.

## 2024-12-11 NOTE — PROGRESS NOTES
Encounter Date:12/18/2024        Patient ID: Catarina Mclean is a 69 y.o. female.      Chief Complaint:      History of Present Illness  69 years old woman who has been diagnosed with non-Hodgkin's lymphoma comes to cardiology office to establish care.  She has DLBCL with previous anthracycline based chemotherapy.  The plan is to resume chemotherapy and she is most likely to exceed maximum permissible lifetime dose of anthracycline.  She has been referred to cardiology office to closely monitor cardiac function during chemotherapy and reduce the risk of cardiomyopathy in future.    She complains of malaise/fatigue.  She is a very active individual and also mows her own lawn.  She denies any chest pain/shortness of breath.  She is also losing weight.    Current cardiac medications include amlodipine, aspirin, lisinopril and pravastatin.    The following portions of the patient's history were reviewed and updated as appropriate: allergies, current medications, past family history, past medical history, past social history, past surgical history, and problem list.    Review of Systems   Constitutional: Positive for malaise/fatigue. Negative for fever.   HENT:  Negative for congestion and hearing loss.    Eyes:  Negative for double vision and visual disturbance.   Cardiovascular:  Negative for chest pain, claudication, dyspnea on exertion, leg swelling, palpitations and syncope.   Respiratory:  Negative for cough and shortness of breath.    Endocrine: Negative for cold intolerance.   Skin:  Negative for color change and rash.   Musculoskeletal:  Negative for arthritis and joint pain.   Gastrointestinal:  Negative for abdominal pain and heartburn.   Genitourinary:  Negative for hematuria.   Neurological:  Negative for excessive daytime sleepiness and dizziness.   Psychiatric/Behavioral:  Negative for depression. The patient is not nervous/anxious.    All other systems reviewed and are negative.        Current Outpatient  Medications:     allopurinol (ZYLOPRIM) 300 MG tablet, Take 1 tablet by mouth Daily., Disp: 30 tablet, Rfl: 0    amLODIPine (NORVASC) 5 MG tablet, Take 1 tablet by mouth Daily., Disp: , Rfl:     aspirin 81 MG chewable tablet, Chew Daily., Disp: , Rfl:     lisinopril (PRINIVIL,ZESTRIL) 20 MG tablet, Take 1 tablet by mouth Daily., Disp: , Rfl:     Omega-3 Fatty Acids (fish oil) 1000 MG capsule capsule, Take 2 capsules by mouth Daily With Breakfast., Disp: , Rfl:     pravastatin (PRAVACHOL) 20 MG tablet, Take 1 tablet by mouth Daily., Disp: , Rfl:     sulfamethoxazole-trimethoprim (BACTRIM DS,SEPTRA DS) 800-160 MG per tablet, Take 1 tablet by mouth Every Other Day., Disp: 15 tablet, Rfl: 5    traZODone (DESYREL) 50 MG tablet, Take 1 tablet by mouth Every Night., Disp: , Rfl:     acyclovir (ZOVIRAX) 400 MG tablet, Take 1 tablet by mouth 2 (Two) Times a Day. Take no more than 5 doses a day., Disp: 60 tablet, Rfl: 5    Current outpatient and discharge medications have been reconciled for the patient.  Reviewed by: Israel García MD       No Known Allergies    Family History   Problem Relation Age of Onset    Liver cancer Brother     Breast cancer Maternal Aunt        Past Surgical History:   Procedure Laterality Date    BREAST BIOPSY      HYSTERECTOMY         Past Medical History:   Diagnosis Date    Drug therapy     Hyperlipidemia     Hypertension     Non Hodgkin's lymphoma 2009       Family History   Problem Relation Age of Onset    Liver cancer Brother     Breast cancer Maternal Aunt        Social History     Socioeconomic History    Marital status:    Tobacco Use    Smoking status: Never     Passive exposure: Never    Smokeless tobacco: Never   Vaping Use    Vaping status: Never Used   Substance and Sexual Activity    Alcohol use: Yes     Alcohol/week: 10.0 standard drinks of alcohol     Types: 10 Cans of beer per week     Comment: WEEKLY    Drug use: Never    Sexual activity: Defer               Objective:  "      Physical Exam    /82   Pulse 89   Ht 160 cm (63\")   Wt 78.5 kg (173 lb)   SpO2 96%   BMI 30.65 kg/m²   The patient is alert, oriented and in no distress.    Vital signs as noted above.    Head and neck revealed no carotid bruits or jugular venous distension.  No thyromegaly or lymphadenopathy is present.    Lungs clear.  No wheezing.  Breath sounds are normal bilaterally.    Heart normal first and second heart sounds.  No murmur..  No pericardial rub is present.  No gallop is present.    Abdomen soft and nontender.  No organomegaly is present.    Extremities revealed good peripheral pulses without any pedal edema.    Skin warm and dry.    Musculoskeletal system is grossly normal.    CNS grossly normal.           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 Diffuse large B-cell lymphoma, unspecified body region        3. Primary hypertension        4. Mixed hyperlipidemia        5. Class 1 obesity due to excess calories without serious comorbidity with body mass index (BMI) of 31.0 to 31.9 in adult        LAB RESULTS (LAST 7 DAYS)    CBC          BMP          CMP             BNP        TROPONIN        CoAg        Creatinine Clearance  Estimated Creatinine Clearance: 55.4 mL/min (by C-G formula based on SCr of 0.95 mg/dL).    ABG        Radiology  No radiology results for the last day    EKG    ECG 12 Lead    Date/Time: 12/18/2024 2:41 PM  Performed by: Israel García MD    Authorized by: Israel García MD  Comparison: not compared with previous ECG   Previous ECG: no previous ECG available  Comments: ECG shows normal sinus rhythm at 75 bpm.  Heart rate 75, QRS 85, QTc 391 ms          Stress test      Echocardiogram  Results for orders placed during the hospital encounter of 12/16/24    Adult Transthoracic Echo Complete W/ Cont if Necessary Per Protocol    Interpretation Summary    Left ventricular ejection fraction appears to be 61 - 65%.    Left ventricular diastolic function is " consistent with (grade Ia w/high LAP) impaired relaxation.    The left atrial cavity is dilated.    Estimated right ventricular systolic pressure from tricuspid regurgitation is normal (<35 mmHg).    No significant valvular abnormalities noted.    Extracardiac findings: Periportal lymph nodes are noted.      Cardiac catheterization  No results found for this or any previous visit.          Assessment and Plan       Diagnoses and all orders for this visit:    1.  Prevention of cardiomyopathy due to anthracycline (Primary)  HFpEF  Patient is likely to exceed maximum permissible lifetime anthracycline dosage for treatment of non-Hodgkin's lymphoma.  Echocardiogram shows preserved LV function, grade 1 diastolic dysfunction with dilated left atrium  Will continue close surveillance echocardiograms while she receives chemotherapy  Continue ACE inhibitor  I will start her on Coreg and Jardiance today  She appears euvolemic on exam    2. Diffuse large B-cell lymphoma, unspecified body region  Followed by oncology  Chemotherapy for non-Hodgkin's lymphoma    3. Primary hypertension  Discontinue amlodipine  Continue lisinopril  Starting Coreg  Keep systolic blood pressure ~120 mmHg    4. Mixed hyperlipidemia  Continue currently on pravastatin.  Check lipid panel    5. Class 1 obesity due to excess calories without serious comorbidity with body mass index (BMI) of 31.0 to 31.9 in adult  BMI 30.6.  She weighs 173 pounds.  Lifestyle modifications recommended to the patient

## 2024-12-12 ENCOUNTER — TELEPHONE (OUTPATIENT)
Dept: ONCOLOGY | Facility: CLINIC | Age: 69
End: 2024-12-12
Payer: MEDICARE

## 2024-12-12 DIAGNOSIS — C83.31 DIFFUSE LARGE B-CELL LYMPHOMA, LYMPH NODES OF HEAD, FACE, AND NECK: Primary | ICD-10-CM

## 2024-12-12 PROBLEM — C85.80 LYMPHOMA MALIGNANT, LARGE CELL: Status: ACTIVE | Noted: 2024-12-12

## 2024-12-12 NOTE — TELEPHONE ENCOUNTER
Per Dr. Sethi, he would like for pt to start chemo next week.  Pt will need an echo and cardiology appointment prior to starting chemo.  Pt is scheduled for Monday 12/16/24 at 9:30 for her echo and Wednesday 12/18 at 2:30 for her cardiology appointment.   Group email sent to all hospital contacts involved in admitting patient for chemo.  Per Susan RN they will admit pt sometime after her cardiology appointment on 12/18.  They will call pt when they have a bed available.  Pt notified of the above and v/u.

## 2024-12-13 LAB
AGGRESSIVE B-CELL LYMPHOMA TARGETGENE PANEL RESULT: NORMAL
TARGETGENE RESULT: NORMAL

## 2024-12-16 ENCOUNTER — HOSPITAL ENCOUNTER (OUTPATIENT)
Dept: CARDIOLOGY | Facility: HOSPITAL | Age: 69
Discharge: HOME | End: 2024-12-16
Admitting: STUDENT IN AN ORGANIZED HEALTH CARE EDUCATION/TRAINING PROGRAM
Payer: MEDICARE

## 2024-12-16 VITALS
SYSTOLIC BLOOD PRESSURE: 159 MMHG | HEART RATE: 66 BPM | WEIGHT: 177 LBS | BODY MASS INDEX: 31.36 KG/M2 | HEIGHT: 63 IN | DIASTOLIC BLOOD PRESSURE: 72 MMHG

## 2024-12-16 DIAGNOSIS — C83.31 DIFFUSE LARGE B-CELL LYMPHOMA, LYMPH NODES OF HEAD, FACE, AND NECK: ICD-10-CM

## 2024-12-16 DIAGNOSIS — Z51.11 ENCOUNTER FOR ANTINEOPLASTIC CHEMOTHERAPY: ICD-10-CM

## 2024-12-16 LAB
BH CV ECHO MEAS - AO MAX PG: 13.6 MMHG
BH CV ECHO MEAS - AO MEAN PG: 7 MMHG
BH CV ECHO MEAS - AO ROOT DIAM: 2.9 CM
BH CV ECHO MEAS - AO V2 MAX: 184.4 CM/SEC
BH CV ECHO MEAS - AO V2 VTI: 38.9 CM
BH CV ECHO MEAS - AVA(I,D): 2.34 CM2
BH CV ECHO MEAS - EDV(CUBED): 76.4 ML
BH CV ECHO MEAS - EDV(MOD-SP4): 66.3 ML
BH CV ECHO MEAS - EF(MOD-BP): 70 %
BH CV ECHO MEAS - EF(MOD-SP4): 70.2 %
BH CV ECHO MEAS - ESV(CUBED): 17.1 ML
BH CV ECHO MEAS - ESV(MOD-SP4): 19.8 ML
BH CV ECHO MEAS - FS: 39.3 %
BH CV ECHO MEAS - IVS/LVPW: 1.01 CM
BH CV ECHO MEAS - IVSD: 0.89 CM
BH CV ECHO MEAS - LA DIMENSION: 4 CM
BH CV ECHO MEAS - LV MASS(C)D: 117.6 GRAMS
BH CV ECHO MEAS - LV MAX PG: 9.3 MMHG
BH CV ECHO MEAS - LV MEAN PG: 4.1 MMHG
BH CV ECHO MEAS - LV V1 MAX: 152.8 CM/SEC
BH CV ECHO MEAS - LV V1 VTI: 30.1 CM
BH CV ECHO MEAS - LVIDD: 4.2 CM
BH CV ECHO MEAS - LVIDS: 2.6 CM
BH CV ECHO MEAS - LVOT AREA: 3 CM2
BH CV ECHO MEAS - LVOT DIAM: 1.96 CM
BH CV ECHO MEAS - LVPWD: 0.88 CM
BH CV ECHO MEAS - MR MAX PG: 135.7 MMHG
BH CV ECHO MEAS - MR MAX VEL: 582.1 CM/SEC
BH CV ECHO MEAS - MV A MAX VEL: 147.8 CM/SEC
BH CV ECHO MEAS - MV DEC SLOPE: 394.5 CM/SEC2
BH CV ECHO MEAS - MV DEC TIME: 0.22 SEC
BH CV ECHO MEAS - MV E MAX VEL: 85.3 CM/SEC
BH CV ECHO MEAS - MV E/A: 0.58
BH CV ECHO MEAS - MV MAX PG: 6.8 MMHG
BH CV ECHO MEAS - MV MEAN PG: 1.93 MMHG
BH CV ECHO MEAS - MV V2 VTI: 42.7 CM
BH CV ECHO MEAS - MVA(VTI): 2.14 CM2
BH CV ECHO MEAS - PA V2 MAX: 132.6 CM/SEC
BH CV ECHO MEAS - PI END-D VEL: 111.1 CM/SEC
BH CV ECHO MEAS - PULM A REVS DUR: 0.14 SEC
BH CV ECHO MEAS - PULM A REVS VEL: 37.8 CM/SEC
BH CV ECHO MEAS - PULM DIAS VEL: 56.7 CM/SEC
BH CV ECHO MEAS - PULM S/D: 1.38
BH CV ECHO MEAS - PULM SYS VEL: 78.4 CM/SEC
BH CV ECHO MEAS - RAP SYSTOLE: 3 MMHG
BH CV ECHO MEAS - RV MAX PG: 2.11 MMHG
BH CV ECHO MEAS - RV V1 MAX: 72.7 CM/SEC
BH CV ECHO MEAS - RV V1 VTI: 14 CM
BH CV ECHO MEAS - RVSP: 26.5 MMHG
BH CV ECHO MEAS - SV(LVOT): 91.2 ML
BH CV ECHO MEAS - SV(MOD-SP4): 46.5 ML
BH CV ECHO MEAS - TR MAX PG: 23.5 MMHG
BH CV ECHO MEAS - TR MAX VEL: 241.3 CM/SEC

## 2024-12-16 PROCEDURE — 93306 TTE W/DOPPLER COMPLETE: CPT

## 2024-12-16 NOTE — PROGRESS NOTES
HEMATOLOGY ONCOLOGY OUTPATIENT FOLLOW UP       Patient name: Catarina Mclean  : 1955  MRN: [id number]  Primary Care Physician: Bozena Alamo NP  Referring Physician: No ref. provider found  Reason For Consult:       History of Present Illness:  Patient is a 69-year-old female who has been referred to us for further evaluation and management of diffuse lymphadenopathy.    She reported having symptoms of worsening fatigue, poor appetite intermittent night sweats and palpable axillary adenopathy approximately 2 months ago.  She was seen by her PCP for the symptoms and was empirically treated with oral antibiotics with limited improvement in her symptoms.      Bilateral breast screening mammogram in 2024 was reported unremarkable.  [BI-RADS 1]    Subsequently a CT chest abdomen pelvis were ordered and is reported as follows:  2024: CT chest/abdomen/pelvis:  Impression:  1.Supra diaphragmatic and infra diaphragmatic lymphadenopathy concerning for lymphoma.     Her past medical history significant for non-Hodgkin lymphoma [likely DLBCL], diagnosed in -10  She reported that she was being followed by Dr. Barkley and had systemic therapy for lymphoma in [id number].  She did not follow-up with oncologist thereafter and was lost to follow-up.  Patient is unable to provide any additional details about her diagnosis or treatment.    Limited prior medical records from  were reviewed:    Patient underwent bilateral tonsillectomy in 2009,  Pathology: Left tonsil was reported to have involvement from malignant lymphoma, large cell type of B-cell origin.  [Of activated B-cell phenotype].  IHC staining was reported positive for CD45, CD20, Bcl-2 and MUM1 with Ki-67 98%.  The tumor was negative for BEV.    A bone marrow biopsy was reported negative for lymphoma involvement at that time.    Patient stated that she is up-to-date whether age-appropriate cancer screening  including annual screening mammograms, Pap smears and colonoscopies.  Last colonoscopy was approximately 10 years ago and was reported normal per patient.    Family history significant for unknown cancer in brother who has been recently diagnosed..      Patient presents for initial consultation today.  She reported having symptoms of generalized fatigue, poor appetite and palpable axillary adenopathy in right axilla and right supraclavicular area for last few weeks.  Denied any other respiratory or GI/ symptoms.  She reported having occasional night sweats but denied any weight loss or fever/chills.  No recent infections reported.    11/20/24: Lymph node, right axillary:  Large B-cell lymphoma  Immunohistochemistry  There is predominance of intermediate to large sized CD45 and CD20+ B-lymphocytes. CD3 and CD5 reveal numerous small T-lymphocytes. B-cells are negative for CD5, CD10, CD15, CD30, and BEV. They are positive for PAX-5, BCL-2, BCL-6, and MUM-1, as well as c-MYC (~40% of neoplastic cells). Ki-67 shows high proliferative rate (50-60%).    Flow Cytometry: Abnormal/ monotypic B-cell population (4% of sample)  Comment: Scatter properties compatible with increased cell size, cells characterized as:  CD45+, CD19+, CD20+, CD5-, CD10-, CD23-, FMC7-, CD30-, CD38-, CD43-/+, HLA  DR+, sIg lambda+    FISH PANEL: Abnormal Lymphoma FISH panel  Aneuploidy: gain of BCL6/3q, MYC/8q, and BCL2/18q  Additional IGH/14q    12/3/24: PET/CT:  Impression:  1. Hypermetabolic left cervical chain, bilateral supraclavicular, right axillary, right subpectoral, portacaval and retroperitoneal adenopathy consistent with known lymphoma.  2. Two small hypermetabolic splenic lesions likely also related to lymphoma. No splenomegaly.  3. Hypermetabolic osseous uptake associated with C3 vertebral body and right posterior 12th rib likely osseous involvement of lymphoma.  4. Additional incidental CT findings above.    12/10/24: Patient seen  today for follow-up visit to discuss her biopsy and imaging results.  She reported again having ongoing fatigue and decreased appetite.  Denied any new complaints today.    12/16/2024: Transthoracic echocardiogram:    Left ventricular ejection fraction appears to be 61 - 65%.    Left ventricular diastolic function is consistent with (grade Ia w/high LAP) impaired relaxation.    The left atrial cavity is dilated.    Estimated right ventricular systolic pressure from tricuspid regurgitation is normal (<35 mmHg).    No significant valvular abnormalities noted.    Extracardiac findings: Periportal lymph nodes are noted.    Subjective:  12/23/2024: Patient seen today for follow-up.  Has petechial rash on her chest and extremities which is new.  Has some ongoing fatigue but otherwise stable    Past Medical History:   Diagnosis Date    Drug therapy     Hyperlipidemia     Hypertension     Non Hodgkin's lymphoma 2009       Past Surgical History:   Procedure Laterality Date    BREAST BIOPSY      HYSTERECTOMY           Current Outpatient Medications:     acyclovir (ZOVIRAX) 400 MG tablet, Take 1 tablet by mouth 2 (Two) Times a Day. Take no more than 5 doses a day., Disp: 60 tablet, Rfl: 5    allopurinol (ZYLOPRIM) 300 MG tablet, Take 1 tablet by mouth Daily., Disp: 30 tablet, Rfl: 0    amLODIPine (NORVASC) 5 MG tablet, Take 1 tablet by mouth Daily., Disp: , Rfl:     aspirin 81 MG chewable tablet, Chew Daily., Disp: , Rfl:     lisinopril (PRINIVIL,ZESTRIL) 20 MG tablet, Take 1 tablet by mouth Daily., Disp: , Rfl:     Omega-3 Fatty Acids (fish oil) 1000 MG capsule capsule, Take 2 capsules by mouth Daily With Breakfast., Disp: , Rfl:     pravastatin (PRAVACHOL) 20 MG tablet, Take 1 tablet by mouth Daily., Disp: , Rfl:     sulfamethoxazole-trimethoprim (BACTRIM DS,SEPTRA DS) 800-160 MG per tablet, Take 1 tablet by mouth Every Other Day., Disp: 15 tablet, Rfl: 5    traZODone (DESYREL) 50 MG tablet, Take 1 tablet by mouth Every  "Night., Disp: , Rfl:     No Known Allergies    Family History   Problem Relation Age of Onset    Liver cancer Brother     Breast cancer Maternal Aunt        Cancer-related family history includes Breast cancer in her maternal aunt; Liver cancer in her brother.      Social History     Tobacco Use    Smoking status: Never   Vaping Use    Vaping status: Never Used   Substance Use Topics    Alcohol use: Yes     Alcohol/week: 10.0 standard drinks of alcohol     Types: 10 Cans of beer per week     Comment: WEEKLY    Drug use: Never     Social History     Social History Narrative    Not on file       ROS:   Review of Systems   Constitutional:  Positive for activity change, appetite change and fatigue.   HENT: Negative.     Eyes: Negative.    Respiratory: Negative.     Cardiovascular: Negative.    Gastrointestinal: Negative.    Endocrine: Negative.    Genitourinary: Negative.    Musculoskeletal: Negative.    Skin: Negative.    Allergic/Immunologic: Negative.    Neurological: Negative.    Hematological: Negative.    Psychiatric/Behavioral: Negative.           Objective:    Vital Signs:  Vitals:    12/23/24 1521   BP: 114/74   Pulse: 57   Temp: 98 °F (36.7 °C)   TempSrc: Oral   SpO2: 97%   Weight: 77.8 kg (171 lb 9.6 oz)   Height: 160 cm (62.99\")   PainSc: 0-No pain       Body mass index is 30.41 kg/m².    ECOG  (1) Restricted in physically strenuous activity, ambulatory and able to do work of light nature    Physical Exam:   Physical Exam  Constitutional:       Appearance: Normal appearance. She is normal weight.   HENT:      Head: Normocephalic and atraumatic.      Right Ear: External ear normal.      Left Ear: External ear normal.      Nose: Nose normal.      Mouth/Throat:      Mouth: Mucous membranes are moist.      Pharynx: Oropharynx is clear.   Eyes:      Extraocular Movements: Extraocular movements intact.      Conjunctiva/sclera: Conjunctivae normal.      Pupils: Pupils are equal, round, and reactive to light. " "  Cardiovascular:      Rate and Rhythm: Normal rate.      Pulses: Normal pulses.   Pulmonary:      Effort: Pulmonary effort is normal.   Abdominal:      General: Abdomen is flat.      Palpations: Abdomen is soft.   Musculoskeletal:         General: Normal range of motion.      Cervical back: Normal range of motion and neck supple.   Lymphadenopathy:      Upper Body:      Right upper body: Supraclavicular adenopathy and axillary adenopathy present.   Skin:     General: Skin is warm.   Neurological:      General: No focal deficit present.      Mental Status: She is alert and oriented to person, place, and time.   Psychiatric:         Mood and Affect: Mood normal.         Behavior: Behavior normal.         Thought Content: Thought content normal.         Judgment: Judgment normal.         Lab Results - Last 18 Months   Lab Units 12/26/24  0408 12/25/24  1130 12/23/24  1511   WBC 10*3/mm3 3.99 4.04 5.17   HEMOGLOBIN g/dL 12.1 12.8 12.8   HEMATOCRIT % 37.8 39.2 39.9   PLATELETS 10*3/mm3 95* 58* 67*   MCV fL 85.3 85.2 86.7     Lab Results - Last 18 Months   Lab Units 12/25/24  1130 12/23/24  1511 12/10/24  1246   SODIUM mmol/L 137 136 140   POTASSIUM mmol/L 4.0 4.1 4.2   CHLORIDE mmol/L 103 100 102   CO2 mmol/L 22.8 26.0 28.1   BUN mg/dL 13 10 8   CREATININE mg/dL 1.14* 1.26* 0.95   CALCIUM mg/dL 9.7 10.1 10.5   BILIRUBIN mg/dL 0.9 1.0 1.2   ALK PHOS U/L 99 89 91   ALT (SGPT) U/L 22 21 16   AST (SGOT) U/L 31 29 25   GLUCOSE mg/dL 147* 98 109*       Lab Results   Component Value Date    GLUCOSE 109 (H) 12/10/2024    BUN 8 12/10/2024    CREATININE 0.95 12/10/2024    BCR 8.4 12/10/2024    K 4.2 12/10/2024    CO2 28.1 12/10/2024    CALCIUM 10.5 12/10/2024    ALBUMIN 4.5 12/10/2024    AST 25 12/10/2024    ALT 16 12/10/2024       No results for input(s): \"APTT\", \"INR\", \"PTT\" in the last 04038 hours.    No results found for: \"IRON\", \"TIBC\", \"FERRITIN\"    No results found for: \"FOLATE\"    No results found for: \"OCCULTBLD\"    No " "results found for: \"RETICCTPCT\"  No results found for: \"JWTWWXWC98\"  No results found for: \"SPEP\", \"UPEP\"  LDH   Date Value Ref Range Status   12/10/2024 322 (H) 135 - 214 U/L Final     Uric Acid   Date Value Ref Range Status   12/10/2024 4.6 2.4 - 5.7 mg/dL Final     Lab Results   Component Value Date    OTTONIEL Positive (A) 11/20/2024    SEDRATE 4 12/10/2024     No results found for: \"FIBRINOGEN\", \"HAPTOGLOBIN\"  No results found for: \"PTT\", \"INR\"  No results found for: \"\"  No results found for: \"CEA\"  No components found for: \"CA-19-9\"  No results found for: \"PSA\"  Lab Results   Component Value Date    SEDRATE 4 12/10/2024          Assessment & Plan     Generalized lymphadenopathy:  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  Patient has remote history of aggressive NHL, status post chemotherapy in 2009-10.  -CT chest/abdomen/pelvis reviewed, noted to have extensive lymphadenopathy involving supraclavicular, axillary and intra-abdominal lymph nodes.  No solid organ masses noted concerning for solid organ metastasis.  -Axillary lymph node biopsy and PET/CT findings reviewed as above.  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  Ki-67 is elevated at 50-60%  -I reviewed these findings with patient in detail.  Given extensive disease and aggressive disease biology, she was initially being considered for dose adjusted R-EPOCH regimen, I also discussed her case with [facility] BMT attending Dr. Zuleta. Due to concern about prior exposure to anthracyclines during at time of her initial diagnosis and treatment in 2009-10, repeat treatment with anthracycline based regimen may carry risk of significant cardiotoxicity. AS per BMT team, she may be candidate for Salvage chemotherapy or CAR-T therapy instead. will " refer her to [facility] clinic for further evaluation,     She will also need CNS prophylaxis  with IT methotrexate with systemic therapy given high risk for CNS involvement [CNS IPI 4, associated with high risk of CNS involvement].  -Hepatitis panel reported normal      Cardiac health: Detailed medical records pertaining to her prior cancer treatment are not available, however it seems she had outpatient chemotherapy with R-CHOP or similar regimen containing anthracyclines..  Will try to obtain these records but there is significant concern about patient crossing the maximum recommended lifetime dose for anthracyclines with her planned lymphoma treatment.  -Discussed her case with cardiology [Dr. García].  She was referred to cardiology clinic to discuss cardiac risk reduction and to start prophylactic medications [beta-blocker/ACE inhibitor's etc.].  -Initial echocardiogram reported normal with EF 61-65%      TLS prophylaxis: Started patient on allopurinol due to bulky disease and risk of TLS.  Will continue throughout treatment    ID: Started patient on antiviral and PCP prophylaxis with acyclovir and Bactrim.    Skin Rash:   Thrombocytopenia:  Noted peticheal rash on her chest and extremities. Also noted downtrending Plt count. Will hold bactrim for now.    Will see her in 2 weeks or as needed. [facility] clinic referral in progress.          Thank you very much for providing the opportunity to participate in this patient’s care. Please do not hesitate to call if there are any other questions.

## 2024-12-17 LAB
LAB AP CASE REPORT: NORMAL
LAB AP FLOW CYTOMETRY SUMMARY: NORMAL
Lab: NORMAL
PATH REPORT.ADDENDUM SPEC: NORMAL
PATH REPORT.FINAL DX SPEC: NORMAL
PATH REPORT.GROSS SPEC: NORMAL

## 2024-12-18 ENCOUNTER — OFFICE VISIT (OUTPATIENT)
Dept: CARDIOLOGY | Facility: CLINIC | Age: 69
End: 2024-12-18
Payer: MEDICARE

## 2024-12-18 VITALS
BODY MASS INDEX: 30.65 KG/M2 | DIASTOLIC BLOOD PRESSURE: 82 MMHG | SYSTOLIC BLOOD PRESSURE: 148 MMHG | HEIGHT: 63 IN | WEIGHT: 173 LBS | HEART RATE: 89 BPM | OXYGEN SATURATION: 96 %

## 2024-12-18 DIAGNOSIS — I10 PRIMARY HYPERTENSION: ICD-10-CM

## 2024-12-18 DIAGNOSIS — C83.30 DIFFUSE LARGE B-CELL LYMPHOMA, UNSPECIFIED BODY REGION: ICD-10-CM

## 2024-12-18 DIAGNOSIS — E78.2 MIXED HYPERLIPIDEMIA: ICD-10-CM

## 2024-12-18 DIAGNOSIS — I50.32 CHRONIC HEART FAILURE WITH PRESERVED EJECTION FRACTION (HFPEF): ICD-10-CM

## 2024-12-18 DIAGNOSIS — E66.09 CLASS 1 OBESITY DUE TO EXCESS CALORIES WITHOUT SERIOUS COMORBIDITY WITH BODY MASS INDEX (BMI) OF 31.0 TO 31.9 IN ADULT: ICD-10-CM

## 2024-12-18 DIAGNOSIS — E66.811 CLASS 1 OBESITY DUE TO EXCESS CALORIES WITHOUT SERIOUS COMORBIDITY WITH BODY MASS INDEX (BMI) OF 31.0 TO 31.9 IN ADULT: ICD-10-CM

## 2024-12-18 DIAGNOSIS — T45.1X5A CARDIOMYOPATHY DUE TO ANTHRACYCLINE: Primary | ICD-10-CM

## 2024-12-18 DIAGNOSIS — I42.7 CARDIOMYOPATHY DUE TO ANTHRACYCLINE: Primary | ICD-10-CM

## 2024-12-18 RX ORDER — CARVEDILOL 6.25 MG/1
6.25 TABLET ORAL 2 TIMES DAILY
Qty: 180 TABLET | Refills: 3 | Status: SHIPPED | OUTPATIENT
Start: 2024-12-18

## 2024-12-19 ENCOUNTER — TELEPHONE (OUTPATIENT)
Dept: CARDIOLOGY | Facility: CLINIC | Age: 69
End: 2024-12-19
Payer: MEDICARE

## 2024-12-19 NOTE — TELEPHONE ENCOUNTER
Caller: Gowers, Kyle    Relationship to patient: Emergency Contact    Best call back number: [phone]    Patient is needing: SAMPLES OF JARDIANCE 10MG

## 2024-12-23 ENCOUNTER — OFFICE VISIT (OUTPATIENT)
Dept: ONCOLOGY | Facility: CLINIC | Age: 69
End: 2024-12-23
Payer: MEDICARE

## 2024-12-23 ENCOUNTER — APPOINTMENT (OUTPATIENT)
Dept: LAB | Facility: HOSPITAL | Age: 69
DRG: 813 | End: 2024-12-23
Payer: MEDICARE

## 2024-12-23 ENCOUNTER — LAB (OUTPATIENT)
Dept: LAB | Facility: HOSPITAL | Age: 69
End: 2024-12-23
Payer: MEDICARE

## 2024-12-23 VITALS
TEMPERATURE: 98 F | OXYGEN SATURATION: 97 % | HEIGHT: 63 IN | WEIGHT: 171.6 LBS | DIASTOLIC BLOOD PRESSURE: 74 MMHG | BODY MASS INDEX: 30.41 KG/M2 | HEART RATE: 57 BPM | SYSTOLIC BLOOD PRESSURE: 114 MMHG

## 2024-12-23 DIAGNOSIS — C83.31 DIFFUSE LARGE B-CELL LYMPHOMA, LYMPH NODES OF HEAD, FACE, AND NECK: Primary | ICD-10-CM

## 2024-12-23 DIAGNOSIS — E78.2 MIXED HYPERLIPIDEMIA: ICD-10-CM

## 2024-12-23 DIAGNOSIS — Z85.72 HISTORY OF NON-HODGKIN'S LYMPHOMA: ICD-10-CM

## 2024-12-23 DIAGNOSIS — R59.1 LYMPHADENOPATHY: ICD-10-CM

## 2024-12-23 DIAGNOSIS — R59.0 AXILLARY LYMPHADENOPATHY: ICD-10-CM

## 2024-12-23 LAB
ALBUMIN SERPL-MCNC: 4.4 G/DL (ref 3.5–5.2)
ALBUMIN/GLOB SERPL: 1.7 G/DL
ALP SERPL-CCNC: 89 U/L (ref 39–117)
ALT SERPL W P-5'-P-CCNC: 21 U/L (ref 1–33)
ANION GAP SERPL CALCULATED.3IONS-SCNC: 10 MMOL/L (ref 5–15)
AST SERPL-CCNC: 29 U/L (ref 1–32)
BASOPHILS # BLD AUTO: 0.01 10*3/MM3 (ref 0–0.2)
BASOPHILS NFR BLD AUTO: 0.2 % (ref 0–1.5)
BILIRUB SERPL-MCNC: 1 MG/DL (ref 0–1.2)
BUN SERPL-MCNC: 10 MG/DL (ref 8–23)
BUN/CREAT SERPL: 7.9 (ref 7–25)
CALCIUM SPEC-SCNC: 10.1 MG/DL (ref 8.6–10.5)
CHLORIDE SERPL-SCNC: 100 MMOL/L (ref 98–107)
CHOLEST SERPL-MCNC: 120 MG/DL (ref 0–200)
CO2 SERPL-SCNC: 26 MMOL/L (ref 22–29)
CREAT SERPL-MCNC: 1.26 MG/DL (ref 0.57–1)
DEPRECATED RDW RBC AUTO: 45.1 FL (ref 37–54)
EGFRCR SERPLBLD CKD-EPI 2021: 46.3 ML/MIN/1.73
EOSINOPHIL # BLD AUTO: 0.57 10*3/MM3 (ref 0–0.4)
EOSINOPHIL NFR BLD AUTO: 11 % (ref 0.3–6.2)
ERYTHROCYTE [DISTWIDTH] IN BLOOD BY AUTOMATED COUNT: 14.5 % (ref 12.3–15.4)
GLOBULIN UR ELPH-MCNC: 2.6 GM/DL
GLUCOSE SERPL-MCNC: 98 MG/DL (ref 65–99)
HCT VFR BLD AUTO: 39.9 % (ref 34–46.6)
HDLC SERPL-MCNC: 38 MG/DL (ref 40–60)
HGB BLD-MCNC: 12.8 G/DL (ref 12–15.9)
HOLD SPECIMEN: NORMAL
LDH SERPL-CCNC: 280 U/L (ref 135–214)
LDLC SERPL CALC-MCNC: 55 MG/DL (ref 0–100)
LDLC/HDLC SERPL: 1.33 {RATIO}
LYMPHOCYTES # BLD AUTO: 0.64 10*3/MM3 (ref 0.7–3.1)
LYMPHOCYTES NFR BLD AUTO: 12.4 % (ref 19.6–45.3)
MAGNESIUM SERPL-MCNC: 2 MG/DL (ref 1.6–2.4)
MCH RBC QN AUTO: 27.8 PG (ref 26.6–33)
MCHC RBC AUTO-ENTMCNC: 32.1 G/DL (ref 31.5–35.7)
MCV RBC AUTO: 86.7 FL (ref 79–97)
MONOCYTES # BLD AUTO: 0.56 10*3/MM3 (ref 0.1–0.9)
MONOCYTES NFR BLD AUTO: 10.8 % (ref 5–12)
NEUTROPHILS NFR BLD AUTO: 3.39 10*3/MM3 (ref 1.7–7)
NEUTROPHILS NFR BLD AUTO: 65.6 % (ref 42.7–76)
PHOSPHATE SERPL-MCNC: 3.4 MG/DL (ref 2.5–4.5)
PLATELET # BLD AUTO: 67 10*3/MM3 (ref 140–450)
PMV BLD AUTO: 9.1 FL (ref 6–12)
POTASSIUM SERPL-SCNC: 4.1 MMOL/L (ref 3.5–5.2)
PROT SERPL-MCNC: 7 G/DL (ref 6–8.5)
RBC # BLD AUTO: 4.6 10*6/MM3 (ref 3.77–5.28)
SODIUM SERPL-SCNC: 136 MMOL/L (ref 136–145)
TRIGL SERPL-MCNC: 157 MG/DL (ref 0–150)
VLDLC SERPL-MCNC: 27 MG/DL (ref 5–40)
WBC NRBC COR # BLD AUTO: 5.17 10*3/MM3 (ref 3.4–10.8)

## 2024-12-23 PROCEDURE — 80061 LIPID PANEL: CPT | Performed by: INTERNAL MEDICINE

## 2024-12-23 PROCEDURE — 83615 LACTATE (LD) (LDH) ENZYME: CPT | Performed by: STUDENT IN AN ORGANIZED HEALTH CARE EDUCATION/TRAINING PROGRAM

## 2024-12-23 PROCEDURE — 83735 ASSAY OF MAGNESIUM: CPT | Performed by: STUDENT IN AN ORGANIZED HEALTH CARE EDUCATION/TRAINING PROGRAM

## 2024-12-23 PROCEDURE — 36415 COLL VENOUS BLD VENIPUNCTURE: CPT

## 2024-12-23 PROCEDURE — 85025 COMPLETE CBC W/AUTO DIFF WBC: CPT

## 2024-12-23 PROCEDURE — 84100 ASSAY OF PHOSPHORUS: CPT | Performed by: STUDENT IN AN ORGANIZED HEALTH CARE EDUCATION/TRAINING PROGRAM

## 2024-12-23 PROCEDURE — 80053 COMPREHEN METABOLIC PANEL: CPT | Performed by: STUDENT IN AN ORGANIZED HEALTH CARE EDUCATION/TRAINING PROGRAM

## 2024-12-25 ENCOUNTER — HOSPITAL ENCOUNTER (INPATIENT)
Facility: HOSPITAL | Age: 69
LOS: 1 days | Discharge: HOME | End: 2024-12-27
Attending: EMERGENCY MEDICINE | Admitting: INTERNAL MEDICINE
Payer: MEDICARE

## 2024-12-25 DIAGNOSIS — R21 RASH: ICD-10-CM

## 2024-12-25 DIAGNOSIS — D69.6 THROMBOCYTOPENIA: Primary | ICD-10-CM

## 2024-12-25 LAB
ALBUMIN SERPL-MCNC: 4.2 G/DL (ref 3.5–5.2)
ALBUMIN/GLOB SERPL: 1.5 G/DL
ALP SERPL-CCNC: 99 U/L (ref 39–117)
ALT SERPL W P-5'-P-CCNC: 22 U/L (ref 1–33)
ANION GAP SERPL CALCULATED.3IONS-SCNC: 11.2 MMOL/L (ref 5–15)
APTT PPP: 35.8 SECONDS (ref 22.7–35.4)
AST SERPL-CCNC: 31 U/L (ref 1–32)
BASOPHILS # BLD AUTO: 0.01 10*3/MM3 (ref 0–0.2)
BASOPHILS NFR BLD AUTO: 0.2 % (ref 0–1.5)
BILIRUB SERPL-MCNC: 0.9 MG/DL (ref 0–1.2)
BUN SERPL-MCNC: 13 MG/DL (ref 8–23)
BUN/CREAT SERPL: 11.4 (ref 7–25)
CALCIUM SPEC-SCNC: 9.7 MG/DL (ref 8.6–10.5)
CHLORIDE SERPL-SCNC: 103 MMOL/L (ref 98–107)
CO2 SERPL-SCNC: 22.8 MMOL/L (ref 22–29)
CREAT SERPL-MCNC: 1.14 MG/DL (ref 0.57–1)
DEPRECATED RDW RBC AUTO: 43.9 FL (ref 37–54)
EGFRCR SERPLBLD CKD-EPI 2021: 52.2 ML/MIN/1.73
EOSINOPHIL # BLD AUTO: 0.7 10*3/MM3 (ref 0–0.4)
EOSINOPHIL NFR BLD AUTO: 17.3 % (ref 0.3–6.2)
ERYTHROCYTE [DISTWIDTH] IN BLOOD BY AUTOMATED COUNT: 14.2 % (ref 12.3–15.4)
GLOBULIN UR ELPH-MCNC: 2.8 GM/DL
GLUCOSE SERPL-MCNC: 147 MG/DL (ref 65–99)
HCT VFR BLD AUTO: 39.2 % (ref 34–46.6)
HGB BLD-MCNC: 12.8 G/DL (ref 12–15.9)
HOLD SPECIMEN: NORMAL
IMM GRANULOCYTES # BLD AUTO: 0.02 10*3/MM3 (ref 0–0.05)
IMM GRANULOCYTES NFR BLD AUTO: 0.5 % (ref 0–0.5)
INR PPP: 1.05 (ref 0.9–1.1)
LARGE PLATELETS: NORMAL
LYMPHOCYTES # BLD AUTO: 0.9 10*3/MM3 (ref 0.7–3.1)
LYMPHOCYTES NFR BLD AUTO: 22.3 % (ref 19.6–45.3)
MCH RBC QN AUTO: 27.8 PG (ref 26.6–33)
MCHC RBC AUTO-ENTMCNC: 32.7 G/DL (ref 31.5–35.7)
MCV RBC AUTO: 85.2 FL (ref 79–97)
MONOCYTES # BLD AUTO: 0.38 10*3/MM3 (ref 0.1–0.9)
MONOCYTES NFR BLD AUTO: 9.4 % (ref 5–12)
NEUTROPHILS NFR BLD AUTO: 2.03 10*3/MM3 (ref 1.7–7)
NEUTROPHILS NFR BLD AUTO: 50.3 % (ref 42.7–76)
NEUTS VAC BLD QL SMEAR: NORMAL
NRBC BLD AUTO-RTO: 0 /100 WBC (ref 0–0.2)
PLATELET # BLD AUTO: 58 10*3/MM3 (ref 140–450)
PMV BLD AUTO: 11.3 FL (ref 6–12)
POIKILOCYTOSIS BLD QL SMEAR: NORMAL
POTASSIUM SERPL-SCNC: 4 MMOL/L (ref 3.5–5.2)
PROT SERPL-MCNC: 7 G/DL (ref 6–8.5)
PROTHROMBIN TIME: 13.7 SECONDS (ref 11.7–14.2)
RBC # BLD AUTO: 4.6 10*6/MM3 (ref 3.77–5.28)
SMALL PLATELETS BLD QL SMEAR: ADEQUATE
SODIUM SERPL-SCNC: 137 MMOL/L (ref 136–145)
WBC NRBC COR # BLD AUTO: 4.04 10*3/MM3 (ref 3.4–10.8)

## 2024-12-25 PROCEDURE — 85610 PROTHROMBIN TIME: CPT | Performed by: EMERGENCY MEDICINE

## 2024-12-25 PROCEDURE — 85025 COMPLETE CBC W/AUTO DIFF WBC: CPT | Performed by: EMERGENCY MEDICINE

## 2024-12-25 PROCEDURE — 85730 THROMBOPLASTIN TIME PARTIAL: CPT | Performed by: EMERGENCY MEDICINE

## 2024-12-25 PROCEDURE — 25010000002 DIPHENHYDRAMINE PER 50 MG: Performed by: EMERGENCY MEDICINE

## 2024-12-25 PROCEDURE — 85007 BL SMEAR W/DIFF WBC COUNT: CPT | Performed by: EMERGENCY MEDICINE

## 2024-12-25 PROCEDURE — 25010000002 METHYLPREDNISOLONE PER 125 MG: Performed by: EMERGENCY MEDICINE

## 2024-12-25 PROCEDURE — G0378 HOSPITAL OBSERVATION PER HR: HCPCS

## 2024-12-25 PROCEDURE — 99285 EMERGENCY DEPT VISIT HI MDM: CPT

## 2024-12-25 PROCEDURE — 80053 COMPREHEN METABOLIC PANEL: CPT | Performed by: EMERGENCY MEDICINE

## 2024-12-25 RX ORDER — AMLODIPINE BESYLATE 5 MG/1
5 TABLET ORAL DAILY
Status: DISCONTINUED | OUTPATIENT
Start: 2024-12-26 | End: 2024-12-27 | Stop reason: HOSPADM

## 2024-12-25 RX ORDER — ATORVASTATIN CALCIUM 10 MG/1
10 TABLET, FILM COATED ORAL DAILY
Status: DISCONTINUED | OUTPATIENT
Start: 2024-12-26 | End: 2024-12-27 | Stop reason: HOSPADM

## 2024-12-25 RX ORDER — ALUMINA, MAGNESIA, AND SIMETHICONE 2400; 2400; 240 MG/30ML; MG/30ML; MG/30ML
15 SUSPENSION ORAL EVERY 6 HOURS PRN
Status: DISCONTINUED | OUTPATIENT
Start: 2024-12-25 | End: 2024-12-27 | Stop reason: HOSPADM

## 2024-12-25 RX ORDER — ALLOPURINOL 300 MG/1
300 TABLET ORAL DAILY
Status: DISCONTINUED | OUTPATIENT
Start: 2024-12-26 | End: 2024-12-27 | Stop reason: HOSPADM

## 2024-12-25 RX ORDER — POLYETHYLENE GLYCOL 3350 17 G/17G
17 POWDER, FOR SOLUTION ORAL DAILY PRN
Status: DISCONTINUED | OUTPATIENT
Start: 2024-12-25 | End: 2024-12-27 | Stop reason: HOSPADM

## 2024-12-25 RX ORDER — ONDANSETRON 2 MG/ML
4 INJECTION INTRAMUSCULAR; INTRAVENOUS EVERY 6 HOURS PRN
Status: DISCONTINUED | OUTPATIENT
Start: 2024-12-25 | End: 2024-12-27 | Stop reason: HOSPADM

## 2024-12-25 RX ORDER — SODIUM CHLORIDE 0.9 % (FLUSH) 0.9 %
10 SYRINGE (ML) INJECTION AS NEEDED
Status: DISCONTINUED | OUTPATIENT
Start: 2024-12-25 | End: 2024-12-27 | Stop reason: HOSPADM

## 2024-12-25 RX ORDER — DIPHENHYDRAMINE HYDROCHLORIDE 50 MG/ML
25 INJECTION INTRAMUSCULAR; INTRAVENOUS ONCE
Status: COMPLETED | OUTPATIENT
Start: 2024-12-25 | End: 2024-12-25

## 2024-12-25 RX ORDER — ONDANSETRON 4 MG/1
4 TABLET, ORALLY DISINTEGRATING ORAL EVERY 6 HOURS PRN
Status: DISCONTINUED | OUTPATIENT
Start: 2024-12-25 | End: 2024-12-27 | Stop reason: HOSPADM

## 2024-12-25 RX ORDER — SODIUM CHLORIDE 0.9 % (FLUSH) 0.9 %
10 SYRINGE (ML) INJECTION EVERY 12 HOURS SCHEDULED
Status: DISCONTINUED | OUTPATIENT
Start: 2024-12-25 | End: 2024-12-27 | Stop reason: HOSPADM

## 2024-12-25 RX ORDER — CARVEDILOL 6.25 MG/1
6.25 TABLET ORAL 2 TIMES DAILY
Status: DISCONTINUED | OUTPATIENT
Start: 2024-12-25 | End: 2024-12-27 | Stop reason: HOSPADM

## 2024-12-25 RX ORDER — BISACODYL 5 MG/1
5 TABLET, DELAYED RELEASE ORAL DAILY PRN
Status: DISCONTINUED | OUTPATIENT
Start: 2024-12-25 | End: 2024-12-27 | Stop reason: HOSPADM

## 2024-12-25 RX ORDER — AMOXICILLIN 250 MG
2 CAPSULE ORAL 2 TIMES DAILY PRN
Status: DISCONTINUED | OUTPATIENT
Start: 2024-12-25 | End: 2024-12-27 | Stop reason: HOSPADM

## 2024-12-25 RX ORDER — ASPIRIN 81 MG/1
81 TABLET, CHEWABLE ORAL DAILY
Status: DISCONTINUED | OUTPATIENT
Start: 2024-12-26 | End: 2024-12-27 | Stop reason: HOSPADM

## 2024-12-25 RX ORDER — SODIUM CHLORIDE 9 MG/ML
40 INJECTION, SOLUTION INTRAVENOUS AS NEEDED
Status: DISCONTINUED | OUTPATIENT
Start: 2024-12-25 | End: 2024-12-27 | Stop reason: HOSPADM

## 2024-12-25 RX ORDER — BISACODYL 10 MG
10 SUPPOSITORY, RECTAL RECTAL DAILY PRN
Status: DISCONTINUED | OUTPATIENT
Start: 2024-12-25 | End: 2024-12-27 | Stop reason: HOSPADM

## 2024-12-25 RX ORDER — METHYLPREDNISOLONE SODIUM SUCCINATE 125 MG/2ML
125 INJECTION, POWDER, LYOPHILIZED, FOR SOLUTION INTRAMUSCULAR; INTRAVENOUS ONCE
Status: COMPLETED | OUTPATIENT
Start: 2024-12-25 | End: 2024-12-25

## 2024-12-25 RX ORDER — LISINOPRIL 20 MG/1
20 TABLET ORAL DAILY
Status: DISCONTINUED | OUTPATIENT
Start: 2024-12-26 | End: 2024-12-27 | Stop reason: HOSPADM

## 2024-12-25 RX ADMIN — CARVEDILOL 6.25 MG: 6.25 TABLET, FILM COATED ORAL at 20:26

## 2024-12-25 RX ADMIN — METHYLPREDNISOLONE SODIUM SUCCINATE 125 MG: 125 INJECTION, POWDER, FOR SOLUTION INTRAMUSCULAR; INTRAVENOUS at 14:20

## 2024-12-25 RX ADMIN — DIPHENHYDRAMINE HYDROCHLORIDE 25 MG: 50 INJECTION, SOLUTION INTRAMUSCULAR; INTRAVENOUS at 12:45

## 2024-12-25 NOTE — ED PROVIDER NOTES
"Subjective   History of Present Illness  69-year-old female with history of large B-cell lymphoma who has not started chemotherapy presents for rash.  Been going on approximately 3 days.  Seen by oncologist and instructed to discontinue Bactrim at that time and is on Monday.  Rash continuing to worsen.  States she had some chills on Saturday but none since.  Been becoming mildly itchy.  Review of Systems  See HPI.  Past Medical History:   Diagnosis Date    Drug therapy     Hyperlipidemia     Hypertension     Non Hodgkin's lymphoma 2009       No Known Allergies    Past Surgical History:   Procedure Laterality Date    BREAST BIOPSY      HYSTERECTOMY         Family History   Problem Relation Age of Onset    Liver cancer Brother     Breast cancer Maternal Aunt        Social History     Socioeconomic History    Marital status:    Tobacco Use    Smoking status: Never     Passive exposure: Never    Smokeless tobacco: Never   Vaping Use    Vaping status: Never Used   Substance and Sexual Activity    Alcohol use: Yes     Alcohol/week: 10.0 standard drinks of alcohol     Types: 10 Cans of beer per week     Comment: WEEKLY    Drug use: Never    Sexual activity: Defer           Objective   Physical Exam  No acute distress, regular rate and rhythm, diffuse blanchable erythematous rash, some very scant petechiae over lower extremities, no intraoral lesions noted, no tachypnea or increased work of breathing, abdomen soft and nontender without rebound or guarding, no scleral icterus, no gingival bleeding noted.  Procedures           ED Course      /78   Pulse 58   Temp 98.2 °F (36.8 °C) (Oral)   Resp 18   Ht 157.5 cm (62\")   Wt 78.4 kg (172 lb 13.5 oz)   SpO2 98%   BMI 31.61 kg/m²   Labs Reviewed   COMPREHENSIVE METABOLIC PANEL - Abnormal; Notable for the following components:       Result Value    Glucose 147 (*)     Creatinine 1.14 (*)     eGFR 52.2 (*)     All other components within normal limits    " Narrative:     GFR Categories in Chronic Kidney Disease (CKD)      GFR Category          GFR (mL/min/1.73)    Interpretation  G1                     90 or greater         Normal or high (1)  G2                      60-89                Mild decrease (1)  G3a                   45-59                Mild to moderate decrease  G3b                   30-44                Moderate to severe decrease  G4                    15-29                Severe decrease  G5                    14 or less           Kidney failure          (1)In the absence of evidence of kidney disease, neither GFR category G1 or G2 fulfill the criteria for CKD.    eGFR calculation 2021 CKD-EPI creatinine equation, which does not include race as a factor   APTT - Abnormal; Notable for the following components:    PTT 35.8 (*)     All other components within normal limits   CBC WITH AUTO DIFFERENTIAL - Abnormal; Notable for the following components:    Platelets 58 (*)     Eosinophil % 17.3 (*)     Eosinophils, Absolute 0.70 (*)     All other components within normal limits   PROTIME-INR - Normal   SCAN SLIDE   BASIC METABOLIC PANEL   CBC WITH AUTO DIFFERENTIAL   CBC AND DIFFERENTIAL    Narrative:     The following orders were created for panel order CBC & Differential.  Procedure                               Abnormality         Status                     ---------                               -----------         ------                     CBC Auto Differential[[id number]]        Abnormal            Final result               Scan Slide[[id number]]                                       Final result                 Please view results for these tests on the individual orders.   EXTRA TUBES    Narrative:     The following orders were created for panel order Extra Tubes.  Procedure                               Abnormality         Status                     ---------                               -----------         ------                     Gold Top -  SST[[id number]]                                   Final result                 Please view results for these tests on the individual orders.   [facility] - [facility]   CBC AND DIFFERENTIAL    Narrative:     The following orders were created for panel order CBC & Differential.  Procedure                               Abnormality         Status                     ---------                               -----------         ------                     CBC Auto Differential[[id number]]                                                         Please view results for these tests on the individual orders.                                                      Medical Decision Making    Discussed with Dr. Ocasio and was concerned that patient does not have follow-up scheduled.  Patient has no follow-up.  States she has no appointments coming up.  States that she still needs to get established with [facility] where she plans to do chemotherapy.  Patient with significantly worsening thrombocytopenia.  Starting some steroids here for rash.  Patient with no improvement with Benadryl.  Will admit to hospitalist for recheck of platelets tomorrow.  Final diagnoses:   Thrombocytopenia   Rash       ED Disposition  ED Disposition       ED Disposition   Decision to Admit    Condition   --    Comment   --               No follow-up provider specified.       Medication List      No changes were made to your prescriptions during this visit.            Albin Flores MD  12/25/24 8529

## 2024-12-25 NOTE — ED NOTES
Nursing report ED to floor  [place] ADAM Mclean  69 y.o.  female    HPI:   Chief Complaint   Patient presents with    Rash       Admitting doctor:   No admitting provider for patient encounter.    Admitting diagnosis:   The primary encounter diagnosis was Thrombocytopenia. A diagnosis of Rash was also pertinent to this visit.    Code status:   Current Code Status       Date Active Code Status Order ID Comments User Context       12/25/2024 1333 CPR (Attempt to Resuscitate) [id number]  Jocelyn Bojorquez PA-C ED        Question Answer    Code Status (Patient has no pulse and is not breathing) CPR (Attempt to Resuscitate)    Medical Interventions (Patient has pulse or is breathing) Full Support                    Allergies:   Patient has no known allergies.    Isolation:  No active isolations     Fall Risk:  Fall Risk Assessment was completed, and patient is at low risk for falls.   Predictive Model Details         28 (Low) Factor Value    Calculated 12/25/2024 13:30 Age 69    Risk of Fall Model Active Peripheral IV Present     Skin Assessment X     Respiratory Rate 18     Magnesium 2 mg/dL     Elias Scale not on file     Total Bilirubin 0.9 mg/dL     Creatinine 1.14 mg/dL     Number of Distinct Medication Classes administered 1     Diastolic BP 87     ALT 22 U/L     Albumin 4.2 g/dL     Chloride 103 mmol/L     Days after Admission 0.1     Potassium 4 mmol/L     Calcium 9.7 mg/dL         Weight:       12/25/24  1103   Weight: 77.5 kg (170 lb 13.7 oz)       Intake and Output  No intake or output data in the 24 hours ending 12/25/24 1346    Diet:        Most recent vitals:   Vitals:    12/25/24 1302 12/25/24 1303 12/25/24 1304 12/25/24 1332   BP: 118/87   127/72   BP Location:       Patient Position:       Pulse: 54 56 54 57   Resp:       Temp:       TempSrc:       SpO2: 90% 100% 99% 95%   Weight:       Height:           Active LDAs/IV Access:   Lines, Drains & Airways       Active LDAs       Name Placement date Placement  time Site Days    Peripheral IV 12/25/24 1129 Left Antecubital 12/25/24  1129  Antecubital  less than 1                    Skin Condition:   Skin Assessments (last day)       Date/Time Skin WDL Skin Color/Characteristics    12/25/24 11:30:37 X;all redness blanchable;other (see comments)     Skin Color/Characteristics: RASH at 12/25/24 1130             Labs (abnormal labs have a star):   Labs Reviewed   COMPREHENSIVE METABOLIC PANEL - Abnormal; Notable for the following components:       Result Value    Glucose 147 (*)     Creatinine 1.14 (*)     eGFR 52.2 (*)     All other components within normal limits    Narrative:     GFR Categories in Chronic Kidney Disease (CKD)      GFR Category          GFR (mL/min/1.73)    Interpretation  G1                     90 or greater         Normal or high (1)  G2                      60-89                Mild decrease (1)  G3a                   45-59                Mild to moderate decrease  G3b                   30-44                Moderate to severe decrease  G4                    15-29                Severe decrease  G5                    14 or less           Kidney failure          (1)In the absence of evidence of kidney disease, neither GFR category G1 or G2 fulfill the criteria for CKD.    eGFR calculation 2021 CKD-EPI creatinine equation, which does not include race as a factor   APTT - Abnormal; Notable for the following components:    PTT 35.8 (*)     All other components within normal limits   CBC WITH AUTO DIFFERENTIAL - Abnormal; Notable for the following components:    Platelets 58 (*)     Eosinophil % 17.3 (*)     Eosinophils, Absolute 0.70 (*)     All other components within normal limits   PROTIME-INR - Normal   SCAN SLIDE   CBC AND DIFFERENTIAL    Narrative:     The following orders were created for panel order CBC & Differential.  Procedure                               Abnormality         Status                     ---------                                -----------         ------                     CBC Auto Differential[[id number]]        Abnormal            Final result               Scan Slide[[id number]]                                       Final result                 Please view results for these tests on the individual orders.   EXTRA TUBES    Narrative:     The following orders were created for panel order Extra Tubes.  Procedure                               Abnormality         Status                     ---------                               -----------         ------                     Gold Top - SST[[id number]]                                   Final result                 Please view results for these tests on the individual orders.   GOLD TOP - [facility]       LOC: Person, Place, Time, and Situation    Telemetry:  Observation Unit    Cardiac Monitoring Ordered: no    EKG:   No orders to display       Medications Given in the ED:   Medications   sodium chloride 0.9 % flush 10 mL (has no administration in time range)   methylPREDNISolone sodium succinate (SOLU-Medrol) injection 125 mg (has no administration in time range)   diphenhydrAMINE (BENADRYL) injection 25 mg (25 mg Intravenous Given 12/25/24 1245)       Imaging results:  No radiology results for the last day    Social issues:   Social History     Socioeconomic History    Marital status:    Tobacco Use    Smoking status: Never     Passive exposure: Never    Smokeless tobacco: Never   Vaping Use    Vaping status: Never Used   Substance and Sexual Activity    Alcohol use: Yes     Alcohol/week: 10.0 standard drinks of alcohol     Types: 10 Cans of beer per week     Comment: WEEKLY    Drug use: Never    Sexual activity: Defer       NIH Stroke Scale:  Interval: (not recorded)  1a. Level of Consciousness: (not recorded)  1b. LOC Questions: (not recorded)  1c. LOC Commands: (not recorded)  2. Best Gaze: (not recorded)  3. Visual: (not recorded)  4. Facial Palsy: (not recorded)  5a. Motor Arm, Left:  (not recorded)  5b. Motor Arm, Right: (not recorded)  6a. Motor Leg, Left: (not recorded)  6b. Motor Leg, Right: (not recorded)  7. Limb Ataxia: (not recorded)  8. Sensory: (not recorded)  9. Best Language: (not recorded)  10. Dysarthria: (not recorded)  11. Extinction and Inattention (formerly Neglect): (not recorded)    Total (NIH Stroke Scale): (not recorded)     Additional notable assessment information:     Nursing report ED to floor:  Meggan Oseguera RN   12/25/24 13:46 EST

## 2024-12-26 LAB
ANION GAP SERPL CALCULATED.3IONS-SCNC: 12.8 MMOL/L (ref 5–15)
BASOPHILS # BLD AUTO: 0.01 10*3/MM3 (ref 0–0.2)
BASOPHILS NFR BLD AUTO: 0.3 % (ref 0–1.5)
BUN SERPL-MCNC: 14 MG/DL (ref 8–23)
BUN/CREAT SERPL: 15.7 (ref 7–25)
CALCIUM SPEC-SCNC: 9.7 MG/DL (ref 8.6–10.5)
CHLORIDE SERPL-SCNC: 104 MMOL/L (ref 98–107)
CO2 SERPL-SCNC: 21.2 MMOL/L (ref 22–29)
CREAT SERPL-MCNC: 0.89 MG/DL (ref 0.57–1)
DEPRECATED RDW RBC AUTO: 43.8 FL (ref 37–54)
EGFRCR SERPLBLD CKD-EPI 2021: 70.3 ML/MIN/1.73
EOSINOPHIL # BLD AUTO: 0.02 10*3/MM3 (ref 0–0.4)
EOSINOPHIL NFR BLD AUTO: 0.5 % (ref 0.3–6.2)
ERYTHROCYTE [DISTWIDTH] IN BLOOD BY AUTOMATED COUNT: 14.1 % (ref 12.3–15.4)
GLUCOSE SERPL-MCNC: 156 MG/DL (ref 65–99)
HCT VFR BLD AUTO: 37.8 % (ref 34–46.6)
HGB BLD-MCNC: 12.1 G/DL (ref 12–15.9)
IMM GRANULOCYTES # BLD AUTO: 0.01 10*3/MM3 (ref 0–0.05)
IMM GRANULOCYTES NFR BLD AUTO: 0.3 % (ref 0–0.5)
LYMPHOCYTES # BLD AUTO: 0.78 10*3/MM3 (ref 0.7–3.1)
LYMPHOCYTES NFR BLD AUTO: 19.5 % (ref 19.6–45.3)
MCH RBC QN AUTO: 27.3 PG (ref 26.6–33)
MCHC RBC AUTO-ENTMCNC: 32 G/DL (ref 31.5–35.7)
MCV RBC AUTO: 85.3 FL (ref 79–97)
MONOCYTES # BLD AUTO: 0.1 10*3/MM3 (ref 0.1–0.9)
MONOCYTES NFR BLD AUTO: 2.5 % (ref 5–12)
NEUTROPHILS NFR BLD AUTO: 3.07 10*3/MM3 (ref 1.7–7)
NEUTROPHILS NFR BLD AUTO: 76.9 % (ref 42.7–76)
NRBC BLD AUTO-RTO: 0 /100 WBC (ref 0–0.2)
PLATELET # BLD AUTO: 95 10*3/MM3 (ref 140–450)
PMV BLD AUTO: 12.3 FL (ref 6–12)
POTASSIUM SERPL-SCNC: 4.2 MMOL/L (ref 3.5–5.2)
RBC # BLD AUTO: 4.43 10*6/MM3 (ref 3.77–5.28)
SODIUM SERPL-SCNC: 138 MMOL/L (ref 136–145)
WBC NRBC COR # BLD AUTO: 3.99 10*3/MM3 (ref 3.4–10.8)

## 2024-12-26 PROCEDURE — 80048 BASIC METABOLIC PNL TOTAL CA: CPT | Performed by: PHYSICIAN ASSISTANT

## 2024-12-26 PROCEDURE — 25010000002 METHYLPREDNISOLONE PER 40 MG: Performed by: PHYSICIAN ASSISTANT

## 2024-12-26 PROCEDURE — 85025 COMPLETE CBC W/AUTO DIFF WBC: CPT | Performed by: PHYSICIAN ASSISTANT

## 2024-12-26 RX ORDER — DIPHENHYDRAMINE HYDROCHLORIDE 50 MG/ML
12.5 INJECTION INTRAMUSCULAR; INTRAVENOUS ONCE
Status: DISCONTINUED | OUTPATIENT
Start: 2024-12-26 | End: 2024-12-26

## 2024-12-26 RX ORDER — METHYLPREDNISOLONE SODIUM SUCCINATE 40 MG/ML
40 INJECTION, POWDER, LYOPHILIZED, FOR SOLUTION INTRAMUSCULAR; INTRAVENOUS EVERY 12 HOURS
Status: DISCONTINUED | OUTPATIENT
Start: 2024-12-26 | End: 2024-12-27 | Stop reason: HOSPADM

## 2024-12-26 RX ORDER — HYDROXYZINE HYDROCHLORIDE 25 MG/1
25 TABLET, FILM COATED ORAL 3 TIMES DAILY PRN
Status: DISCONTINUED | OUTPATIENT
Start: 2024-12-26 | End: 2024-12-27 | Stop reason: HOSPADM

## 2024-12-26 RX ORDER — DOXYCYCLINE 100 MG/1
100 CAPSULE ORAL EVERY 12 HOURS SCHEDULED
Status: DISCONTINUED | OUTPATIENT
Start: 2024-12-26 | End: 2024-12-27 | Stop reason: HOSPADM

## 2024-12-26 RX ORDER — DIPHENHYDRAMINE HYDROCHLORIDE 50 MG/ML
12.5 INJECTION INTRAMUSCULAR; INTRAVENOUS DAILY PRN
Status: DISCONTINUED | OUTPATIENT
Start: 2024-12-26 | End: 2024-12-27 | Stop reason: HOSPADM

## 2024-12-26 RX ADMIN — DOXYCYCLINE 100 MG: 100 CAPSULE ORAL at 13:13

## 2024-12-26 RX ADMIN — CARVEDILOL 6.25 MG: 6.25 TABLET, FILM COATED ORAL at 09:08

## 2024-12-26 RX ADMIN — ASPIRIN 81 MG CHEWABLE TABLET 81 MG: 81 TABLET CHEWABLE at 09:08

## 2024-12-26 RX ADMIN — Medication 10 ML: at 23:29

## 2024-12-26 RX ADMIN — Medication 10 ML: at 09:08

## 2024-12-26 RX ADMIN — METHYLPREDNISOLONE SODIUM SUCCINATE 40 MG: 40 INJECTION, POWDER, FOR SOLUTION INTRAMUSCULAR; INTRAVENOUS at 23:29

## 2024-12-26 RX ADMIN — HYDROXYZINE HYDROCHLORIDE 25 MG: 25 TABLET, FILM COATED ORAL at 09:33

## 2024-12-26 RX ADMIN — CARVEDILOL 6.25 MG: 6.25 TABLET, FILM COATED ORAL at 21:00

## 2024-12-26 RX ADMIN — LISINOPRIL 20 MG: 20 TABLET ORAL at 09:08

## 2024-12-26 RX ADMIN — METHYLPREDNISOLONE SODIUM SUCCINATE 40 MG: 40 INJECTION, POWDER, FOR SOLUTION INTRAMUSCULAR; INTRAVENOUS at 13:13

## 2024-12-26 RX ADMIN — ALLOPURINOL 300 MG: 300 TABLET ORAL at 09:08

## 2024-12-26 RX ADMIN — DOXYCYCLINE 100 MG: 100 CAPSULE ORAL at 21:00

## 2024-12-26 RX ADMIN — AMLODIPINE BESYLATE 5 MG: 5 TABLET ORAL at 09:08

## 2024-12-26 RX ADMIN — ATORVASTATIN CALCIUM 10 MG: 10 TABLET ORAL at 09:08

## 2024-12-26 NOTE — H&P
[facility] Observation Unit H&P    Patient Name: Catarina Mclean  : 1955  MRN: [id number]  Primary Care Physician: Bozena Alamo NP  Date of admission: 2024     Patient Care Team:  Bozena Alamo NP as PCP - General (Nurse Practitioner)  Can Sethi MD as Consulting Physician (Hematology and Oncology)  Israel García MD as Cardiologist (Cardiology)          Subjective   History Present Illness     Chief Complaint:   Chief Complaint   Patient presents with    Rash     Rash, abnormal labs    Rash        ED  69-year-old female with history of large B-cell lymphoma who has not started chemotherapy presents for rash. Been going on approximately 3 days. Seen by oncologist and instructed to discontinue Bactrim at that time and is on Monday. Rash continuing to worsen. States she had some chills on Saturday but none since. Been becoming mildly itchy.     Observation 24  Pt concurs with er hpi. Oncology seeing pt here and had directed pt to er yesterday. Pt to be given steroids, abx and planned to dc in am. Benadryl, atarax for itching      Review of Systems   Constitutional: Positive for chills.   Skin:  Positive for itching and rash.             Personal History     Past Medical History:   Past Medical History:   Diagnosis Date    Drug therapy     Hyperlipidemia     Hypertension     Non Hodgkin's lymphoma        Surgical History:      Past Surgical History:   Procedure Laterality Date    BREAST BIOPSY      HYSTERECTOMY             Family History: family history includes Breast cancer in her maternal aunt; Liver cancer in her brother. Otherwise pertinent FHx was reviewed and unremarkable.     Social History:  reports that she has never smoked. She has never been exposed to tobacco smoke. She has never used smokeless tobacco. She reports current alcohol use of about 10.0 standard drinks of alcohol per week. She reports that she does not use drugs.      Medications:  Prior to Admission  medications    Medication Sig Start Date End Date Taking? Authorizing Provider   acyclovir (ZOVIRAX) 400 MG tablet Take 1 tablet by mouth 2 (Two) Times a Day. Take no more than 5 doses a day. 12/10/24  Yes Can Sethi MD   allopurinol (ZYLOPRIM) 300 MG tablet Take 1 tablet by mouth Daily. 12/10/24  Yes Can Sethi MD   amLODIPine (NORVASC) 5 MG tablet Take 1 tablet by mouth Daily. 10/31/24  Yes Jacy Lantigua MD   aspirin 81 MG chewable tablet Chew Daily.   Yes Jacy Lantigua MD   carvedilol (COREG) 6.25 MG tablet Take 1 tablet by mouth 2 (Two) Times a Day. 12/18/24  Yes Israel García MD   empagliflozin (Jardiance) 10 MG tablet tablet Take 1 tablet by mouth Daily. 12/19/24  Yes Israel García MD   lisinopril (PRINIVIL,ZESTRIL) 20 MG tablet Take 1 tablet by mouth Daily. 10/31/24  Yes Jacy Lantigua MD   Omega-3 Fatty Acids (fish oil) 1000 MG capsule capsule Take 2 capsules by mouth Daily With Breakfast.   Yes Jacy Lantigua MD   pravastatin (PRAVACHOL) 20 MG tablet Take 1 tablet by mouth Daily. 10/22/24  Yes Jacy Lantigua MD   traZODone (DESYREL) 50 MG tablet Take 2 tablets by mouth Every Night. 11/8/24  Yes Jacy Lantigua MD       Allergies:  No Known Allergies    Objective   Objective     Vital Signs  Temp:  [97.6 °F (36.4 °C)-98.2 °F (36.8 °C)] 97.6 °F (36.4 °C)  Heart Rate:  [54-65] 59  Resp:  [15-18] 15  BP: ()/(60-87) 155/77  SpO2:  [90 %-100 %] 94 %  on   ;   Device (Oxygen Therapy): room air  Body mass index is 31.61 kg/m².    Physical Exam  Constitutional:       Appearance: Normal appearance.   HENT:      Mouth/Throat:      Mouth: Mucous membranes are moist.   Cardiovascular:      Rate and Rhythm: Normal rate and regular rhythm.   Pulmonary:      Effort: Pulmonary effort is normal.      Breath sounds: Normal breath sounds.   Skin:     Findings: Rash present.   Neurological:      General: No focal deficit present.      Mental Status: She is  alert and oriented to person, place, and time. Mental status is at baseline.   Psychiatric:         Mood and Affect: Mood normal.         Behavior: Behavior normal.             Results Review:  I have personally reviewed most recent lab results and agree with findings, most notably: cbc, cmp, inr, .    Results from last 7 days   Lab Units 12/26/24  0408 12/25/24  1130   WBC 10*3/mm3 3.99 4.04   HEMOGLOBIN g/dL 12.1 12.8   HEMATOCRIT % 37.8 39.2   PLATELETS 10*3/mm3 95* 58*   INR   --  1.05     Results from last 7 days   Lab Units 12/26/24  0408 12/25/24  1130   SODIUM mmol/L 138 137   POTASSIUM mmol/L 4.2 4.0   CHLORIDE mmol/L 104 103   CO2 mmol/L 21.2* 22.8   BUN mg/dL 14 13   CREATININE mg/dL 0.89 1.14*   GLUCOSE mg/dL 156* 147*   CALCIUM mg/dL 9.7 9.7   ALK PHOS U/L  --  99   ALT (SGPT) U/L  --  22   AST (SGOT) U/L  --  31     Estimated Creatinine Clearance: 57.8 mL/min (by C-G formula based on SCr of 0.89 mg/dL).  Brief Urine Lab Results       None            Microbiology Results (last 10 days)       ** No results found for the last 240 hours. **            ECG/EMG Results (most recent)       None                Results for orders placed during the hospital encounter of 12/16/24    Adult Transthoracic Echo Complete W/ Cont if Necessary Per Protocol    Interpretation Summary    Left ventricular ejection fraction appears to be 61 - 65%.    Left ventricular diastolic function is consistent with (grade Ia w/high LAP) impaired relaxation.    The left atrial cavity is dilated.    Estimated right ventricular systolic pressure from tricuspid regurgitation is normal (<35 mmHg).    No significant valvular abnormalities noted.    Extracardiac findings: Periportal lymph nodes are noted.      No radiology results for the last 7 days      Estimated Creatinine Clearance: 57.8 mL/min (by C-G formula based on SCr of 0.89 mg/dL).    Assessment & Plan   Assessment/Plan       Active Hospital Problems    Diagnosis  POA     **Thrombocytopenia [D69.6]  Yes      Resolved Hospital Problems   No resolved problems to display.       Rash  - pt was started on bactrim and developed a rash  - oncology stopped bactrim  - iv benadryl, atarax given  - iv steroids      Thrombocytopenia in large b cell lymphoma  - iv steroids given  - pt has been on acyclovir and developed a rash on bactrim so could be inflammatory response  - oncology following  - recommends starting on doxycycline for empiric coverage due to immunocompromised state      VTE Prophylaxis - Active VTE Prophylaxis  Mechanical:        Start        12/25/24 1434  Maintain Sequential Compression Device  Continuous                          Select Pharmacologic VTE Prophylaxis if Desired & Appropriate      CODE STATUS:    Code Status and Medical Interventions: CPR (Attempt to Resuscitate); Full Support   Ordered at: 12/25/24 1333     Code Status (Patient has no pulse and is not breathing):    CPR (Attempt to Resuscitate)     Medical Interventions (Patient has pulse or is breathing):    Full Support       This patient has been examined wearing personal protective equipment.     I discussed the patient's findings and my recommendations with patient and nursing staff.      Signature:Electronically signed by Jocelyn Bojorquez PA-C, 12/26/24, 10:07 AM EST.

## 2024-12-26 NOTE — PLAN OF CARE
Problem: Adult Inpatient Plan of Care  Goal: Absence of Hospital-Acquired Illness or Injury  Intervention: Identify and Manage Fall Risk  Recent Flowsheet Documentation  Taken 12/26/2024 0100 by Ericka Heard RN  Safety Promotion/Fall Prevention: safety round/check completed  Taken 12/26/2024 0055 by Ericka Heard RN  Safety Promotion/Fall Prevention: safety round/check completed  Taken 12/25/2024 2315 by Ericka Heard RN  Safety Promotion/Fall Prevention: safety round/check completed  Taken 12/25/2024 2200 by Ericka Heard RN  Safety Promotion/Fall Prevention: safety round/check completed  Taken 12/25/2024 2100 by Ericka Heard RN  Safety Promotion/Fall Prevention: safety round/check completed  Taken 12/25/2024 2030 by Ericka Heard RN  Safety Promotion/Fall Prevention: safety round/check completed  Taken 12/25/2024 1900 by Ericka Heard RN  Safety Promotion/Fall Prevention: safety round/check completed  Intervention: Prevent Skin Injury  Recent Flowsheet Documentation  Taken 12/25/2024 2030 by Ericka Heard RN  Body Position: position changed independently  Skin Protection: transparent dressing maintained   Goal Outcome Evaluation:

## 2024-12-26 NOTE — CASE MANAGEMENT/SOCIAL WORK
Discharge Planning Assessment   [facility]     Patient Name: Catarina Mclean  MRN: [id number]  Today's Date: 12/26/2024    Admit Date: 12/25/2024    Plan: Return home alone. Family to transport   Discharge Needs Assessment       Row Name 12/26/24 1452       Living Environment    People in Home alone    Current Living Arrangements home    Potentially Unsafe Housing Conditions none    In the past 12 months has the electric, gas, oil, or water company threatened to shut off services in your home? No    Primary Care Provided by self    Provides Primary Care For no one    Quality of Family Relationships helpful;involved;supportive    Able to Return to Prior Arrangements yes       Resource/Environmental Concerns    Resource/Environmental Concerns none    Transportation Concerns none       Transportation Needs    In the past 12 months, has lack of transportation kept you from medical appointments or from getting medications? no    In the past 12 months, has lack of transportation kept you from meetings, work, or from getting things needed for daily living? No       Food Insecurity    Within the past 12 months, you worried that your food would run out before you got the money to buy more. Never true    Within the past 12 months, the food you bought just didn't last and you didn't have money to get more. Never true       Transition Planning    Patient/Family Anticipates Transition to home    Patient/Family Anticipated Services at Transition none    Transportation Anticipated car, drives self;family or friend will provide       Discharge Needs Assessment    Readmission Within the Last 30 Days no previous admission in last 30 days    Equipment Currently Used at Home none    Concerns to be Addressed no discharge needs identified    Do you want help finding or keeping work or a job? I do not need or want help    Do you want help with school or training? For example, starting or completing job training or getting a high school  diploma, GED or equivalent No    Anticipated Changes Related to Illness none    Equipment Needed After Discharge none                   Discharge Plan       Row Name 12/26/24 1452       Plan    Plan Return home alone. Family to transport    Patient/Family in Agreement with Plan yes    Plan Comments Barriers: IV steroids. Changed to IP.  CM met with Ms. Mclean who is a/o and said she lives alone, drives and is independent. She does not use any equpment. Her son will provide transport home.  PCP and Pharmacy verified. She denies any financial concerns. IMM explained, signed and copy given                  Continued Care and Services - Admitted Since 12/25/2024    No active coordination exists for this encounter.       Expected Discharge Date and Time       Expected Discharge Date Expected Discharge Time    Dec 27, 2024            Demographic Summary       Row Name 12/26/24 1451       General Information    Admission Type inpatient    Arrived From emergency department    Required Notices Provided Important Message from Medicare    Referral Source admission list    Reason for Consult discharge planning    Preferred Language English       Contact Information    Permission Granted to Share Info With                    Functional Status       Row Name 12/26/24 1451       Functional Status    Usual Activity Tolerance good    Current Activity Tolerance moderate       Functional Status, IADL    Medications independent    Meal Preparation independent    Housekeeping independent    Laundry independent    Shopping independent       Mental Status    General Appearance WDL WDL       Mental Status Summary    Recent Changes in Mental Status/Cognitive Functioning no changes       Employment/    Employment Status retired                       Patient Forms       Row Name 12/26/24 1451       Patient Forms    Important Message from Medicare (IMM) Delivered  IMM signed by patient with CM 12/26    Delivered to Patient     Method of delivery In person                      Sonya DAN,RN Case Manager  [facility]  Phone: Desk- [phone] cell- [phone]

## 2024-12-26 NOTE — PLAN OF CARE
Goal Outcome Evaluation:  Plan of Care Reviewed With: patient        Progress: improving  Outcome Evaluation: Patient still has a generalized rash. Atarax was given for itching with success. Patient getting empiric doxycycline and solumedrol. Dr. Sethi following. continue to monitor

## 2024-12-26 NOTE — H&P
[facility] Medicine Services  History & Physical    Patient Name: Catarina Mclean  : 1955  MRN: [id number]  Primary Care Physician:  Bozena Alamo NP  Date of admission: 2024  Date and Time of Service: 2024 at 4:11 PM EST     Subjective      Chief Complaint: Thrombocytopenia    History of Present Illness: Catarina Mclean is a 69 y.o. female with a CMH of B-cell lymphoma who presented to [facility] on 2024 with rash.  Rash began approximately 3 days ago was noted initially to face does look well his chest.  Patient was initially started on Bactrim, and developed itching.  Patient was seen by oncologist and was instructed to discontinue the Bactrim at that time..  Rash did not improve patient also developed symptoms of chills which quickly resolved.  Patient was initially in our observation unit, however it was noted that patient to have thrombocytopenia, oncology evaluated patient started patient on steroids, as well as Atarax and request for observation.      Review of Systems  Review of Systems   Constitutional: Positive for chills.   Skin:  Positive for itching and rash.   Personal History     Past Medical History:   Diagnosis Date    Drug therapy     Hyperlipidemia     Hypertension     Non Hodgkin's lymphoma        Past Surgical History:   Procedure Laterality Date    BREAST BIOPSY      HYSTERECTOMY         Family History: family history includes Breast cancer in her maternal aunt; Liver cancer in her brother. Otherwise pertinent FHx was reviewed and not pertinent to current issue.    Social History:  reports that she has never smoked. She has never been exposed to tobacco smoke. She has never used smokeless tobacco. She reports current alcohol use of about 10.0 standard drinks of alcohol per week. She reports that she does not use drugs.    Home Medications:  Prior to Admission Medications       Prescriptions Last Dose Informant Patient Reported? Taking?     acyclovir (ZOVIRAX) 400 MG tablet 12/25/2024  No Yes    Take 1 tablet by mouth 2 (Two) Times a Day. Take no more than 5 doses a day.    allopurinol (ZYLOPRIM) 300 MG tablet 12/25/2024  No Yes    Take 1 tablet by mouth Daily.    amLODIPine (NORVASC) 5 MG tablet 12/25/2024 Self Yes Yes    Take 1 tablet by mouth Daily.    aspirin 81 MG chewable tablet 12/25/2024  Yes Yes    Chew Daily.    carvedilol (COREG) 6.25 MG tablet 12/25/2024  No Yes    Take 1 tablet by mouth 2 (Two) Times a Day.    empagliflozin (Jardiance) 10 MG tablet tablet 12/25/2024  No Yes    Take 1 tablet by mouth Daily.    lisinopril (PRINIVIL,ZESTRIL) 20 MG tablet 12/25/2024 Self Yes Yes    Take 1 tablet by mouth Daily.    Omega-3 Fatty Acids (fish oil) 1000 MG capsule capsule 12/25/2024 Self Yes Yes    Take 2 capsules by mouth Daily With Breakfast.    pravastatin (PRAVACHOL) 20 MG tablet 12/25/2024 Self Yes Yes    Take 1 tablet by mouth Daily.    traZODone (DESYREL) 50 MG tablet 12/24/2024 Self Yes Yes    Take 2 tablets by mouth Every Night.              Allergies:  No Known Allergies    Objective      Vitals:   Temp:  [97.2 °F (36.2 °C)-97.7 °F (36.5 °C)] 97.2 °F (36.2 °C)  Heart Rate:  [58-65] 59  Resp:  [15-18] 16  BP: (128-155)/(61-78) 134/65  Body mass index is 31.61 kg/m².  Physical Exam  PHYSICAL EXAM  Constitutional:  Well-developed, well-nourished, no acute distress, nontoxic appearance   Eyes:  PERRL, conjunctivae normal, EOMI   HENT:  Atraumatic, external ears normal, nose normal, oropharynx moist, no pharyngeal exudates. Neck-normal range of motion, no tenderness, supple, trachea midline  Respiratory: CTAB, nonlabored respirations without accessory muscle use  Cardiovascular:  Normal rate, normal rhythm, no murmurs, no gallops, no rubs   GI:  Soft, nondistended, normal bowel sounds, nontender, no organomegaly, no mass, no rebound, no guarding   Musculoskeletal:  No edema, no tenderness, no deformities  Integument:  Well hydrated,  erythematous itchy rash noted to décolletage  Lymphatic:  No lymphadenopathy noted   Neurologic:  Alert & oriented x 3, CN 2-12 normal, normal motor function, normal sensory function, no focal deficits noted   Psychiatric:  Speech and behavior appropriate      Diagnostic Data:  Lab Results (last 24 hours)       Procedure Component Value Units Date/Time    Basic Metabolic Panel [[id number]]  (Abnormal) Collected: 12/26/24 0408    Specimen: Blood Updated: 12/26/24 0606     Glucose 156 mg/dL      BUN 14 mg/dL      Creatinine 0.89 mg/dL      Sodium 138 mmol/L      Potassium 4.2 mmol/L      Chloride 104 mmol/L      CO2 21.2 mmol/L      Calcium 9.7 mg/dL      BUN/Creatinine Ratio 15.7     Anion Gap 12.8 mmol/L      eGFR 70.3 mL/min/1.73     Narrative:      GFR Categories in Chronic Kidney Disease (CKD)      GFR Category          GFR (mL/min/1.73)    Interpretation  G1                     90 or greater         Normal or high (1)  G2                      60-89                Mild decrease (1)  G3a                   45-59                Mild to moderate decrease  G3b                   30-44                Moderate to severe decrease  G4                    15-29                Severe decrease  G5                    14 or less           Kidney failure          (1)In the absence of evidence of kidney disease, neither GFR category G1 or G2 fulfill the criteria for CKD.    eGFR calculation 2021 CKD-EPI creatinine equation, which does not include race as a factor    CBC & Differential [[id number]]  (Abnormal) Collected: 12/26/24 0408    Specimen: Blood Updated: 12/26/24 0523    Narrative:      The following orders were created for panel order CBC & Differential.  Procedure                               Abnormality         Status                     ---------                               -----------         ------                     CBC Auto Differential[[id number]]        Abnormal            Final result                 Please view  results for these tests on the individual orders.    CBC Auto Differential [[id number]]  (Abnormal) Collected: 12/26/24 0408    Specimen: Blood Updated: 12/26/24 0523     WBC 3.99 10*3/mm3      RBC 4.43 10*6/mm3      Hemoglobin 12.1 g/dL      Hematocrit 37.8 %      MCV 85.3 fL      MCH 27.3 pg      MCHC 32.0 g/dL      RDW 14.1 %      RDW-SD 43.8 fl      MPV 12.3 fL      Platelets 95 10*3/mm3      Neutrophil % 76.9 %      Lymphocyte % 19.5 %      Monocyte % 2.5 %      Eosinophil % 0.5 %      Basophil % 0.3 %      Immature Grans % 0.3 %      Neutrophils, Absolute 3.07 10*3/mm3      Lymphocytes, Absolute 0.78 10*3/mm3      Monocytes, Absolute 0.10 10*3/mm3      Eosinophils, Absolute 0.02 10*3/mm3      Basophils, Absolute 0.01 10*3/mm3      Immature Grans, Absolute 0.01 10*3/mm3      nRBC 0.0 /100 WBC              Imaging Results (Last 24 Hours)       ** No results found for the last 24 hours. **              Assessment & Plan        This is a 69 y.o. female with:    Active and Resolved Problems  Active Hospital Problems    Diagnosis  POA    **Thrombocytopenia [D69.6]  Yes      Resolved Hospital Problems   No resolved problems to display.       Rash  -IV Benadryl, Atarax  -IV steroids  -Started on doxycycline    Thrombocytopenia-large B-cell lymphoma  -Platelets on admission 67, 58  -IV steroids given for platelets of 58 repeat platelets 95  -Continue steroids  -Repeat labs in a.m.    Hypertension  -Stable  -Blood pressure on admission 134/65  -Continue home regimen    VTE Prophylaxis:  Mechanical VTE prophylaxis orders are present.        The patient desires to be as follows:    CODE STATUS:    Code Status (Patient has no pulse and is not breathing): CPR (Attempt to Resuscitate)  Medical Interventions (Patient has pulse or is breathing): Full Support        Kyle Gowers, who can be contacted at [phone], is the designated person to make medical decisions on the patient's behalf if She is incapable of doing so. This  was clarified with patient and/or next of kin on 12/25/2024 during the course of this H&P.    Admission Status:  I believe this patient meets inpatient status.    Expected Length of Stay: 1    PDMP and Medication Dispenses via Sidebar reviewed and consistent with patient reported medications.    I discussed the patient's findings and my recommendations with patient.      Signature:     This document has been electronically signed by NITIN Burden on December 26, 2024 18:11 [facility] Hospitalist Team

## 2024-12-27 VITALS
OXYGEN SATURATION: 97 % | RESPIRATION RATE: 19 BRPM | HEIGHT: 62 IN | DIASTOLIC BLOOD PRESSURE: 69 MMHG | BODY MASS INDEX: 32.54 KG/M2 | HEART RATE: 54 BPM | SYSTOLIC BLOOD PRESSURE: 147 MMHG | TEMPERATURE: 97.2 F | WEIGHT: 176.81 LBS

## 2024-12-27 LAB
ANION GAP SERPL CALCULATED.3IONS-SCNC: 10.9 MMOL/L (ref 5–15)
BASOPHILS # BLD AUTO: 0.01 10*3/MM3 (ref 0–0.2)
BASOPHILS NFR BLD AUTO: 0.1 % (ref 0–1.5)
BUN SERPL-MCNC: 15 MG/DL (ref 8–23)
BUN/CREAT SERPL: 17.9 (ref 7–25)
CALCIUM SPEC-SCNC: 9.9 MG/DL (ref 8.6–10.5)
CHLORIDE SERPL-SCNC: 106 MMOL/L (ref 98–107)
CO2 SERPL-SCNC: 23.1 MMOL/L (ref 22–29)
CREAT SERPL-MCNC: 0.84 MG/DL (ref 0.57–1)
DEPRECATED RDW RBC AUTO: 43 FL (ref 37–54)
EGFRCR SERPLBLD CKD-EPI 2021: 75.3 ML/MIN/1.73
EOSINOPHIL # BLD AUTO: 0 10*3/MM3 (ref 0–0.4)
EOSINOPHIL NFR BLD AUTO: 0 % (ref 0.3–6.2)
ERYTHROCYTE [DISTWIDTH] IN BLOOD BY AUTOMATED COUNT: 14 % (ref 12.3–15.4)
GLUCOSE SERPL-MCNC: 154 MG/DL (ref 65–99)
HCT VFR BLD AUTO: 37.2 % (ref 34–46.6)
HGB BLD-MCNC: 12 G/DL (ref 12–15.9)
IMM GRANULOCYTES # BLD AUTO: 0.03 10*3/MM3 (ref 0–0.05)
IMM GRANULOCYTES NFR BLD AUTO: 0.4 % (ref 0–0.5)
LYMPHOCYTES # BLD AUTO: 0.92 10*3/MM3 (ref 0.7–3.1)
LYMPHOCYTES NFR BLD AUTO: 11.7 % (ref 19.6–45.3)
MCH RBC QN AUTO: 27.3 PG (ref 26.6–33)
MCHC RBC AUTO-ENTMCNC: 32.3 G/DL (ref 31.5–35.7)
MCV RBC AUTO: 84.7 FL (ref 79–97)
MONOCYTES # BLD AUTO: 0.25 10*3/MM3 (ref 0.1–0.9)
MONOCYTES NFR BLD AUTO: 3.2 % (ref 5–12)
NEUTROPHILS NFR BLD AUTO: 6.63 10*3/MM3 (ref 1.7–7)
NEUTROPHILS NFR BLD AUTO: 84.6 % (ref 42.7–76)
NRBC BLD AUTO-RTO: 0 /100 WBC (ref 0–0.2)
PLATELET # BLD AUTO: 76 10*3/MM3 (ref 140–450)
PMV BLD AUTO: 11.6 FL (ref 6–12)
POTASSIUM SERPL-SCNC: 4.3 MMOL/L (ref 3.5–5.2)
RBC # BLD AUTO: 4.39 10*6/MM3 (ref 3.77–5.28)
SODIUM SERPL-SCNC: 140 MMOL/L (ref 136–145)
WBC NRBC COR # BLD AUTO: 7.84 10*3/MM3 (ref 3.4–10.8)

## 2024-12-27 PROCEDURE — 80048 BASIC METABOLIC PNL TOTAL CA: CPT | Performed by: PHYSICIAN ASSISTANT

## 2024-12-27 PROCEDURE — 85025 COMPLETE CBC W/AUTO DIFF WBC: CPT | Performed by: PHYSICIAN ASSISTANT

## 2024-12-27 PROCEDURE — 25010000002 METHYLPREDNISOLONE PER 40 MG: Performed by: PHYSICIAN ASSISTANT

## 2024-12-27 PROCEDURE — 99222 1ST HOSP IP/OBS MODERATE 55: CPT | Performed by: STUDENT IN AN ORGANIZED HEALTH CARE EDUCATION/TRAINING PROGRAM

## 2024-12-27 RX ORDER — METHYLPREDNISOLONE 4 MG/1
TABLET ORAL
Qty: 21 TABLET | Refills: 0 | Status: SHIPPED | OUTPATIENT
Start: 2024-12-27

## 2024-12-27 RX ORDER — DIPHENHYDRAMINE HCL 25 MG
25 TABLET ORAL EVERY 8 HOURS PRN
Qty: 9 TABLET | Refills: 0 | Status: SHIPPED | OUTPATIENT
Start: 2024-12-27

## 2024-12-27 RX ADMIN — DOXYCYCLINE 100 MG: 100 CAPSULE ORAL at 10:45

## 2024-12-27 RX ADMIN — LISINOPRIL 20 MG: 20 TABLET ORAL at 10:45

## 2024-12-27 RX ADMIN — ATORVASTATIN CALCIUM 10 MG: 10 TABLET ORAL at 10:45

## 2024-12-27 RX ADMIN — AMLODIPINE BESYLATE 5 MG: 5 TABLET ORAL at 10:45

## 2024-12-27 RX ADMIN — CARVEDILOL 6.25 MG: 6.25 TABLET, FILM COATED ORAL at 10:45

## 2024-12-27 RX ADMIN — ASPIRIN 81 MG CHEWABLE TABLET 81 MG: 81 TABLET CHEWABLE at 10:45

## 2024-12-27 RX ADMIN — METHYLPREDNISOLONE SODIUM SUCCINATE 40 MG: 40 INJECTION, POWDER, FOR SOLUTION INTRAMUSCULAR; INTRAVENOUS at 10:45

## 2024-12-27 RX ADMIN — ALLOPURINOL 300 MG: 300 TABLET ORAL at 10:45

## 2024-12-27 RX ADMIN — Medication 10 ML: at 10:42

## 2024-12-27 NOTE — PLAN OF CARE
Goal Outcome Evaluation:  Plan of Care Reviewed With: patient        Progress: improving  Outcome Evaluation: No complaints of itching. Rash is much better this am. Patient wants to go home. continue to monitor

## 2024-12-27 NOTE — PROGRESS NOTES
[facility] MEDICINE SERVICE  DAILY PROGRESS NOTE    NAME: Catarina Mclean  : 1955  MRN: [id number]      LOS: 1 day     PROVIDER OF SERVICE: Rajni Ambriz MD    Chief Complaint: Thrombocytopenia    Subjective:     Interval History:  History taken from: patient    No new complaint    Review of Systems:   Review of Systems   All other systems reviewed and are negative.      Objective:     Vital Signs  Temp:  [97 °F (36.1 °C)-97.8 °F (36.6 °C)] 97.7 °F (36.5 °C)  Heart Rate:  [52-62] 54  Resp:  [16-19] 19  BP: (123-150)/(59-69) 127/59   Body mass index is 32.34 kg/m².    Physical Exam  Physical Exam  Constitutional:       Appearance: Normal appearance.   HENT:      Head: Normocephalic and atraumatic.      Nose: Nose normal.      Mouth/Throat:      Mouth: Mucous membranes are moist.   Eyes:      Extraocular Movements: Extraocular movements intact.      Pupils: Pupils are equal, round, and reactive to light.   Cardiovascular:      Rate and Rhythm: Normal rate and regular rhythm.   Pulmonary:      Effort: Pulmonary effort is normal.      Breath sounds: Normal breath sounds.   Abdominal:      General: Abdomen is flat. Bowel sounds are normal.      Palpations: Abdomen is soft.   Musculoskeletal:         General: Normal range of motion.      Cervical back: Normal range of motion and neck supple.   Skin:     General: Skin is warm and dry.   Neurological:      General: No focal deficit present.      Mental Status: She is alert and oriented to person, place, and time.   Psychiatric:         Mood and Affect: Mood normal.         Behavior: Behavior normal.         Thought Content: Thought content normal.         Judgment: Judgment normal.         Current Medications:  Scheduled Meds:allopurinol, 300 mg, Oral, Daily  amLODIPine, 5 mg, Oral, Daily  aspirin, 81 mg, Oral, Daily  atorvastatin, 10 mg, Oral, Daily  carvedilol, 6.25 mg, Oral, BID  doxycycline, 100 mg, Oral, Q12H  [Held by provider] empagliflozin,  10 mg, Oral, Daily  lisinopril, 20 mg, Oral, Daily  methylPREDNISolone sodium succinate, 40 mg, Intravenous, Q12H  sodium chloride, 10 mL, Intravenous, Q12H      Continuous Infusions:   PRN Meds:.  aluminum-magnesium hydroxide-simethicone    senna-docusate sodium **AND** polyethylene glycol **AND** bisacodyl **AND** bisacodyl    diphenhydrAMINE    hydrOXYzine    ondansetron ODT **OR** ondansetron    [COMPLETED] Insert Peripheral IV **AND** sodium chloride    sodium chloride    sodium chloride       Diagnostic Data    Results from last 7 days   Lab Units 12/27/24  0315 12/26/24  0408 12/25/24  1130   WBC 10*3/mm3 7.84   < > 4.04   HEMOGLOBIN g/dL 12.0   < > 12.8   HEMATOCRIT % 37.2   < > 39.2   PLATELETS 10*3/mm3 76*   < > 58*   GLUCOSE mg/dL 154*   < > 147*   CREATININE mg/dL 0.84   < > 1.14*   BUN mg/dL 15   < > 13   SODIUM mmol/L 140   < > 137   POTASSIUM mmol/L 4.3   < > 4.0   AST (SGOT) U/L  --   --  31   ALT (SGPT) U/L  --   --  22   ALK PHOS U/L  --   --  99   BILIRUBIN mg/dL  --   --  0.9   ANION GAP mmol/L 10.9   < > 11.2    < > = values in this interval not displayed.       No radiology results for the last day      I reviewed the patient's new clinical results.    Assessment/Plan:     Active and Resolved Problems  Active Hospital Problems    Diagnosis  POA    **Thrombocytopenia [D69.6]  Yes      Resolved Hospital Problems   No resolved problems to display.       Rash  - Improving.  Continue steroids and antihistamines.    Thrombocytopenia  Large B-cell lymphoma  - Downward trending platelets today, monitor for now.  - Follows up with oncology outpatient for large B-cell lymphoma.    Hypertension  - Blood pressure is controlled.  Continue current BP med regimen.    VTE Prophylaxis:  Mechanical VTE prophylaxis orders are present.             Disposition Planning:     Barriers to Discharge: Pending clinical improvement  Anticipated Date of Discharge: 12/30/2024  Place of Discharge: Home      Code Status and  Medical Interventions: CPR (Attempt to Resuscitate); Full Support   Ordered at: 12/25/24 1333     Code Status (Patient has no pulse and is not breathing):    CPR (Attempt to Resuscitate)     Medical Interventions (Patient has pulse or is breathing):    Full Support       Signature: Electronically signed by Rajni Ambriz MD, 12/27/24, 15:14 EST.  [facility] Hospitalist Team

## 2024-12-27 NOTE — CONSULTS
[facility]   Consult Note    Patient Name: Catarina Mclean  : 1955  MRN: [id number]  Primary Care Physician:  Bozena Alamo NP  Referring Physician: No ref. provider found  Date of admission: 2024    Subjective   Subjective     Reason for Consult/ Chief Complaint: rash, celluliltis, B cell lymphoma    HPI:  Catarina Mclean is a 69 y.o. female [facility] complaint of diffuse peticheal skin rash involving her upper extremities and chest. [facility] and had similar symptoms. At that time,she was noted to have moderate thrombocytopenia and her bactrim was held which was thought to be the likely cause. However reportedly, her rash continue to worsen following which [facility]. On admission, she was noted to have diffuse skin rash along with thrombocytopenia. She was given a dose of solumedrol in ER following which her symptoms improved. She was also started on Oral Doxycycline and tapering Dose of prednisone to cover for drug induced skin rash as well as Possible cellulitis, although she did not have any other symptoms suggestive of infection.     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 She is awaiting evaluation at [facility] BMT clinic for second opinion.    SUBJECTIVE:  24: Patient seen today for follow up. Her skin rash has improved significantly since admission, denied any other complaints.     Review of Systems  Review of Systems   Constitutional:  Positive for fatigue.   HENT: Negative.     Eyes: Negative.    Respiratory: Negative.     Cardiovascular: Negative.    Gastrointestinal: Negative.    Endocrine: Negative.    Genitourinary: Negative.    Musculoskeletal: Negative.    Skin:  Positive for rash.   Allergic/Immunologic: Negative.    Neurological: Negative.    Hematological:  Positive for adenopathy.    Psychiatric/Behavioral: Negative.           Personal History     Past Medical History:   Diagnosis Date    Drug therapy     Hyperlipidemia     Hypertension     Non Hodgkin's lymphoma 2009       Past Surgical History:   Procedure Laterality Date    BREAST BIOPSY      HYSTERECTOMY         Family History: family history includes Breast cancer in her maternal aunt; Liver cancer in her brother. Otherwise pertinent FHx was reviewed and not pertinent to current issue.    Social History:  reports that she has never smoked. She has never been exposed to tobacco smoke. She has never used smokeless tobacco. She reports current alcohol use of about 10.0 standard drinks of alcohol per week. She reports that she does not use drugs.    Home Medications:  acyclovir, allopurinol, amLODIPine, aspirin, carvedilol, diphenhydrAMINE, empagliflozin, fish oil, lisinopril, methylPREDNISolone, pravastatin, and traZODone    Allergies:  No Known Allergies    Objective    Objective     Vitals:   Temp:  [97 °F (36.1 °C)-97.8 °F (36.6 °C)] 97.2 °F (36.2 °C)  Heart Rate:  [52-62] 54  Resp:  [18-19] 19  BP: (123-150)/(59-69) 147/69    Physical Exam  Constitutional:       Appearance: Normal appearance. She is normal weight.   HENT:      Head: Normocephalic and atraumatic.      Right Ear: External ear normal.      Left Ear: External ear normal.      Nose: Nose normal.      Mouth/Throat:      Mouth: Mucous membranes are moist.      Pharynx: Oropharynx is clear.   Eyes:      Extraocular Movements: Extraocular movements intact.      Conjunctiva/sclera: Conjunctivae normal.      Pupils: Pupils are equal, round, and reactive to light.   Cardiovascular:      Rate and Rhythm: Normal rate.      Pulses: Normal pulses.   Pulmonary:      Effort: Pulmonary effort is normal.   Abdominal:      General: Abdomen is flat.      Palpations: Abdomen is soft.   Musculoskeletal:         General: Normal range of motion.      Cervical back: Normal range of motion and neck  supple.   Lymphadenopathy:      Cervical:      Right cervical: Superficial cervical adenopathy present.      Upper Body:      Right upper body: Axillary adenopathy present.      Left upper body: Axillary adenopathy present.   Skin:     General: Skin is warm.      Findings: Rash (peticheal skin rash involving chest.) present.   Neurological:      General: No focal deficit present.      Mental Status: She is alert and oriented to person, place, and time.   Psychiatric:         Mood and Affect: Mood normal.         Behavior: Behavior normal.         Thought Content: Thought content normal.         Judgment: Judgment normal.       CBC          12/25/2024    11:30 12/26/2024    04:08 12/27/2024    03:15   CBC   WBC 4.04  3.99  7.84    RBC 4.60  4.43  4.39    Hemoglobin 12.8  12.1  12.0    Hematocrit 39.2  37.8  37.2    MCV 85.2  85.3  84.7    MCH 27.8  27.3  27.3    MCHC 32.7  32.0  32.3    RDW 14.2  14.1  14.0    Platelets 58  95  76        CMP          12/25/2024    11:30 12/26/2024    04:08 12/27/2024    03:15   CMP   Glucose 147  156  154    BUN 13  14  15    Creatinine 1.14  0.89  0.84    EGFR 52.2  70.3  75.3    Sodium 137  138  140    Potassium 4.0  4.2  4.3    Chloride 103  104  106    Calcium 9.7  9.7  9.9    Total Protein 7.0      Albumin 4.2      Globulin 2.8      Total Bilirubin 0.9      Alkaline Phosphatase 99      AST (SGOT) 31      ALT (SGPT) 22      Albumin/Globulin Ratio 1.5      BUN/Creatinine Ratio 11.4  15.7  17.9    Anion Gap 11.2  12.8  10.9          Assessment & Plan   Assessment / Plan     Generalized lymphadenopathy:  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  Patient has remote history of aggressive NHL, status post chemotherapy in 2009-10.  -CT chest/abdomen/pelvis reviewed, noted to have extensive lymphadenopathy involving supraclavicular, axillary and intra-abdominal lymph nodes.  No solid organ masses noted concerning for solid organ metastasis.  -Biopsy findings are positive  for large cell lymphoma with Bcl-2/BCL6/MYC rearrangements positive consistent with triple hit lymphoma.  Ki-67 is elevated at 50-60%  -she was initially being considered for dose adjusted R-EPOCH regimen, I also discussed her case with [facility] BMT attending Dr. Zuleta. Due to concern about prior exposure to anthracyclines during at time of her initial diagnosis and treatment in 2009-10, repeat treatment with anthracycline based regimen may carry risk of significant cardiotoxicity. AS per BMT team, she may be candidate for Salvage chemotherapy or CAR-T therapy instead. She has been referred to [facility] BMT clinic for further evaluation, has an upcoming appointment on 12/31/24.     Cardiac health: Detailed medical records pertaining to her prior cancer treatment are not available, however it seems she had outpatient chemotherapy with R-CHOP or similar regimen containing anthracyclines..  Will try to obtain these records but there is significant concern about patient crossing the maximum recommended lifetime dose for anthracyclines with her planned lymphoma treatment.  -Discussed her case with cardiology [Dr. García].  She was referred to cardiology clinic to discuss cardiac risk reduction and to start prophylactic medications [beta-blocker/ACE inhibitor's etc.].  -Initial echocardiogram reported normal with EF 61-65%        TLS prophylaxis: Started patient on allopurinol due to bulky disease and risk of TLS. Advised to continue Allopurinol on discharge.     ID: Started patient on antiviral and PCP prophylaxis with acyclovir and Bactrim. Bactrim on hold.     Skin Rash:   Thrombocytopenia:  Noted peticheal rash on her chest and extremities on initial presentation. Overall picture concerning for Drug eruption vs secondary to thrombocytopenia.  -rash has improved.  Also noted downtrending Plt count. Will hold bactrim for now.  -patient was started on Solumedrol  1mg/Kg in 2 divided doses during hospitalization. Can start on  prednisone 60mg Daily on discharge and taper over next 10 days.  -patient was also started on PO Doxycycline on admission due to concern about cellulitis, will defer to primary team regarding antibiotics on discharge.      Can Sethi MD 12/27/24

## 2024-12-27 NOTE — CASE MANAGEMENT/SOCIAL WORK
Continued Stay Note  [facility]     Patient Name: Catarina Mclean  MRN: [id number]  Today's Date: 12/27/2024    Admit Date: 12/25/2024    Plan: Return home alone. Family to transport   Discharge Plan       Row Name 12/27/24 1414       Plan    Plan Comments Barriers: IV steroids.  AT discharge , Ms. Mclean will return home alone.                      Sonya DAN,RN Case Manager  [facility]  Phone: desk- [phone] cell- [phone]

## 2024-12-27 NOTE — PLAN OF CARE
Problem: Adult Inpatient Plan of Care  Goal: Plan of Care Review  Outcome: Progressing  Flowsheets (Taken 12/27/2024 0437)  Outcome Evaluation: pt without any complaints of nausea and/ or itching this nightshift. pt plans for discharge this am  Goal: Patient-Specific Goal (Individualized)  Outcome: Progressing  Goal: Absence of Hospital-Acquired Illness or Injury  Outcome: Progressing  Intervention: Identify and Manage Fall Risk  Recent Flowsheet Documentation  Taken 12/27/2024 0400 by Glory Dukes RN  Safety Promotion/Fall Prevention:   safety round/check completed   nonskid shoes/slippers when out of bed   lighting adjusted  Taken 12/27/2024 0200 by Glory Dukes RN  Safety Promotion/Fall Prevention:   safety round/check completed   nonskid shoes/slippers when out of bed   lighting adjusted  Taken 12/27/2024 0148 by Glory Dukes RN  Safety Promotion/Fall Prevention:   safety round/check completed   nonskid shoes/slippers when out of bed   lighting adjusted  Taken 12/27/2024 0000 by Glory Dukes RN  Safety Promotion/Fall Prevention:   safety round/check completed   nonskid shoes/slippers when out of bed   lighting adjusted  Taken 12/26/2024 2200 by Glory Dukes RN  Safety Promotion/Fall Prevention:   safety round/check completed   nonskid shoes/slippers when out of bed   lighting adjusted  Taken 12/26/2024 2000 by Glory Dukes RN  Safety Promotion/Fall Prevention:   safety round/check completed   nonskid shoes/slippers when out of bed   lighting adjusted  Intervention: Prevent Skin Injury  Recent Flowsheet Documentation  Taken 12/27/2024 0000 by Glory Dukes RN  Body Position:   30 degrees lateral   sitting up in bed  Goal: Optimal Comfort and Wellbeing  Outcome: Progressing  Goal: Readiness for Transition of Care  Outcome: Progressing   Goal Outcome Evaluation:              Outcome Evaluation: pt without any complaints of nausea and/ or itching this nightshift. pt plans for discharge this  am

## 2024-12-27 NOTE — DISCHARGE SUMMARY
[facility] Medicine Services  Discharge Summary    Date of Service: 2024  Patient Name: Catarina Mclean  : 1955  MRN: [id number]    Date of Admission: 2024  Discharge Diagnosis:    Skin rash  Thrombocytopenia  Large B-cell lymphoma  Hypertension    Date of Discharge: 2024  Primary Care Physician: Bozena Alamo NP          Hospital Course         Hospital Course:    This patient is a 69-year-old lady who presented with a rash.  Rash began on face and spread to the chest.  Patient was initially started on Bactrim by her oncologist whom she sees for the large B-cell lymphoma and developed itching and then the rash.  Her oncologist instructed her to discontinue Bactrim at that time.  She was admitted for the rash with noted thrombocytopenia.  Oncology started patient on steroids and Atarax.  Rash has improved.  Platelet count also improved from 58-95 with slight downward trend today at 76.  The patient is being discharged home on Medrol dose pack as well as Benadryl as needed in view of the rash.  Oncology has cleared for discharge home--Dr. Sethi per the patient's nurse.      DISCHARGE Follow Up Recommendations:-Follow-up with PCP in 1 week, follow-up with oncology in 1 week.        Day of Discharge     Vital Signs:  Temp:  [97 °F (36.1 °C)-97.8 °F (36.6 °C)] 97.2 °F (36.2 °C)  Heart Rate:  [52-62] 54  Resp:  [18-19] 19  BP: (123-150)/(59-69) 147/69    Physical Exam:  Physical Exam  Constitutional:       Appearance: Normal appearance.   HENT:      Head: Normocephalic and atraumatic.      Nose: Nose normal.      Mouth/Throat:      Mouth: Mucous membranes are moist.   Eyes:      Extraocular Movements: Extraocular movements intact.      Pupils: Pupils are equal, round, and reactive to light.   Cardiovascular:      Rate and Rhythm: Normal rate and regular rhythm.   Pulmonary:      Effort: Pulmonary effort is normal.      Breath sounds: Normal breath sounds.   Abdominal:       General: Abdomen is flat. Bowel sounds are normal.      Palpations: Abdomen is soft.   Musculoskeletal:         General: Normal range of motion.      Cervical back: Normal range of motion and neck supple.   Skin:     General: Skin is warm and dry.   Neurological:      General: No focal deficit present.      Mental Status: She is alert and oriented to person, place, and time.   Psychiatric:         Mood and Affect: Mood normal.         Behavior: Behavior normal.         Thought Content: Thought content normal.         Judgment: Judgment normal.            Pertinent  and/or Most Recent Results     LAB RESULTS:      Lab 12/27/24  0315 12/26/24  0408 12/25/24  1130 12/23/24  1511   WBC 7.84 3.99 4.04 5.17   HEMOGLOBIN 12.0 12.1 12.8 12.8   HEMATOCRIT 37.2 37.8 39.2 39.9   PLATELETS 76* 95* 58* 67*   NEUTROS ABS 6.63 3.07 2.03 3.39   IMMATURE GRANS (ABS) 0.03 0.01 0.02  --    LYMPHS ABS 0.92 0.78 0.90 0.64*   MONOS ABS 0.25 0.10 0.38 0.56   EOS ABS 0.00 0.02 0.70* 0.57*   MCV 84.7 85.3 85.2 86.7   LDH  --   --   --  280*   PROTIME  --   --  13.7  --    APTT  --   --  35.8*  --          Lab 12/27/24  0315 12/26/24  0408 12/25/24  1130 12/23/24  1511   SODIUM 140 138 137 136   POTASSIUM 4.3 4.2 4.0 4.1   CHLORIDE 106 104 103 100   CO2 23.1 21.2* 22.8 26.0   ANION GAP 10.9 12.8 11.2 10.0   BUN 15 14 13 10   CREATININE 0.84 0.89 1.14* 1.26*   EGFR 75.3 70.3 52.2* 46.3*   GLUCOSE 154* 156* 147* 98   CALCIUM 9.9 9.7 9.7 10.1   MAGNESIUM  --   --   --  2.0   PHOSPHORUS  --   --   --  3.4         Lab 12/25/24  1130 12/23/24  1511   TOTAL PROTEIN 7.0 7.0   ALBUMIN 4.2 4.4   GLOBULIN 2.8 2.6   ALT (SGPT) 22 21   AST (SGOT) 31 29   BILIRUBIN 0.9 1.0   ALK PHOS 99 89         Lab 12/25/24  1130   PROTIME 13.7   INR 1.05         Lab 12/23/24  1511   CHOLESTEROL 120   LDL CHOL 55   HDL CHOL 38*   TRIGLYCERIDES 157*             Brief Urine Lab Results       None          Microbiology Results (last 10 days)       ** No results found for  the last 240 hours. **            NM PET/CT Skull Base to Mid Thigh    Result Date: 12/9/2024  Impression: Impression: 1. Hypermetabolic left cervical chain, bilateral supraclavicular, right axillary, right subpectoral, portacaval and retroperitoneal adenopathy consistent with known lymphoma. 2. Two small hypermetabolic splenic lesions likely also related to lymphoma. No splenomegaly. 3. Hypermetabolic osseous uptake associated with C3 vertebral body and right posterior 12th rib likely osseous involvement of lymphoma. 4. Additional incidental CT findings above. Electronically Signed: Matty Brown MD  12/9/2024 9:20 AM EST  Workstation ID: [id number]             Results for orders placed during the hospital encounter of 12/16/24    Adult Transthoracic Echo Complete W/ Cont if Necessary Per Protocol    Interpretation Summary    Left ventricular ejection fraction appears to be 61 - 65%.    Left ventricular diastolic function is consistent with (grade Ia w/high LAP) impaired relaxation.    The left atrial cavity is dilated.    Estimated right ventricular systolic pressure from tricuspid regurgitation is normal (<35 mmHg).    No significant valvular abnormalities noted.    Extracardiac findings: Periportal lymph nodes are noted.      Labs Pending at Discharge:  Pending Results       Procedure [Order ID] Specimen - Date/Time    Flow Cytometry [[id number]]     Specimen: Blood             Procedures Performed           Consults:   Consults       Date and Time Order Name Status Description    12/26/2024 12:00 PM Inpatient Hospitalist Consult                Discharge Details        Discharge Medications        New Medications        Instructions Start Date   diphenhydrAMINE 25 MG tablet  Commonly known as: Benadryl Allergy   25 mg, Oral, Every 8 Hours PRN      methylPREDNISolone 4 MG dose pack  Commonly known as: MEDROL   Take as directed on package instructions.             Continue These Medications        Instructions Start  Date   acyclovir 400 MG tablet  Commonly known as: ZOVIRAX   400 mg, Oral, 2 Times Daily, Take no more than 5 doses a day.      allopurinol 300 MG tablet  Commonly known as: ZYLOPRIM   300 mg, Oral, Daily      amLODIPine 5 MG tablet  Commonly known as: NORVASC   Take 1 tablet by mouth Daily.      aspirin 81 MG chewable tablet   Daily      carvedilol 6.25 MG tablet  Commonly known as: COREG   6.25 mg, Oral, 2 Times Daily      empagliflozin 10 MG tablet tablet  Commonly known as: Jardiance   10 mg, Oral, Daily      fish oil 1000 MG capsule capsule   2,000 mg, Daily With Breakfast      lisinopril 20 MG tablet  Commonly known as: PRINIVIL,ZESTRIL   Take 1 tablet by mouth Daily.      pravastatin 20 MG tablet  Commonly known as: PRAVACHOL   Take 1 tablet by mouth Daily.      traZODone 50 MG tablet  Commonly known as: DESYREL   Take 2 tablets by mouth Every Night.               No Known Allergies      Discharge Disposition: Home      Diet:  Hospital:  Diet Order   Procedures    Diet: Regular/House; Fluid Consistency: Thin (IDDSI 0)         Discharge Activity:   Activity Instructions       Activity as Tolerated                CODE STATUS:  Code Status and Medical Interventions: CPR (Attempt to Resuscitate); Full Support   Ordered at: 12/25/24 1333     Code Status (Patient has no pulse and is not breathing):    CPR (Attempt to Resuscitate)     Medical Interventions (Patient has pulse or is breathing):    Full Support         Future Appointments   Date Time Provider Department Center   1/10/2025  9:50 AM [facility]   1/10/2025 10:00 AM Can Sethi MD [facility]   6/20/2025  9:00 AM Israel García MD [facility]       Additional Instructions for the Follow-ups that You Need to Schedule       Discharge Follow-up with PCP   As directed       Currently Documented PCP:    Bozena Alamo NP    PCP Phone Number:    [phone]     Follow Up Details: 1 week        Discharge  Follow-up with Specified Provider: Oncology; 1 Week   As directed      To: Oncology   Follow Up: 1 Week                Time spent on Discharge including face to face service:  > 30 minutes    Signature: Electronically signed by Rajni Ambriz MD, 12/27/24, 16:21 EST.  [facility] Hospitalist Team

## 2024-12-28 ENCOUNTER — READMISSION MANAGEMENT (OUTPATIENT)
Dept: CALL CENTER | Facility: HOSPITAL | Age: 69
End: 2024-12-28
Payer: MEDICARE

## 2024-12-28 NOTE — OUTREACH NOTE
Prep Survey      Flowsheet Row Responses   [facility] facility patient discharged from? [facility]   Is LACE score < 7 ? No   Eligibility Readm Mgmt   Discharge diagnosis Thrombocytopenia   Does the patient have one of the following disease processes/diagnoses(primary or secondary)? Other   Prep survey completed? Yes            Estefany TAO - Registered Nurse

## 2024-12-30 NOTE — CASE MANAGEMENT/SOCIAL WORK
Case Management Discharge Note      Final Note: Routine home                Transportation Services  Private: Car    Final Discharge Disposition Code: 01 - home or self-care

## 2024-12-31 DIAGNOSIS — C83.31 DIFFUSE LARGE B-CELL LYMPHOMA, LYMPH NODES OF HEAD, FACE, AND NECK: Primary | ICD-10-CM

## 2025-01-06 RX ORDER — ALLOPURINOL 300 MG/1
300 TABLET ORAL DAILY
Qty: 30 TABLET | Refills: 0 | Status: SHIPPED | OUTPATIENT
Start: 2025-01-06

## 2025-01-07 ENCOUNTER — TELEPHONE (OUTPATIENT)
Dept: ONCOLOGY | Facility: CLINIC | Age: 70
End: 2025-01-07

## 2025-01-07 ENCOUNTER — LAB (OUTPATIENT)
Dept: LAB | Facility: HOSPITAL | Age: 70
End: 2025-01-07
Payer: MEDICARE

## 2025-01-07 ENCOUNTER — READMISSION MANAGEMENT (OUTPATIENT)
Dept: CALL CENTER | Facility: HOSPITAL | Age: 70
End: 2025-01-07
Payer: MEDICARE

## 2025-01-07 DIAGNOSIS — C83.31 DIFFUSE LARGE B-CELL LYMPHOMA, LYMPH NODES OF HEAD, FACE, AND NECK: ICD-10-CM

## 2025-01-07 LAB
ALBUMIN SERPL-MCNC: 3.7 G/DL (ref 3.5–5.2)
ALBUMIN/GLOB SERPL: 1.5 G/DL
ALP SERPL-CCNC: 67 U/L (ref 39–117)
ALT SERPL W P-5'-P-CCNC: 23 U/L (ref 1–33)
ANION GAP SERPL CALCULATED.3IONS-SCNC: 7.7 MMOL/L (ref 5–15)
AST SERPL-CCNC: 25 U/L (ref 1–32)
BASOPHILS # BLD AUTO: 0.16 10*3/MM3 (ref 0–0.2)
BASOPHILS NFR BLD AUTO: 1.6 % (ref 0–1.5)
BILIRUB SERPL-MCNC: 1.1 MG/DL (ref 0–1.2)
BUN SERPL-MCNC: 10 MG/DL (ref 8–23)
BUN/CREAT SERPL: 11.5 (ref 7–25)
CALCIUM SPEC-SCNC: 9.6 MG/DL (ref 8.6–10.5)
CHLORIDE SERPL-SCNC: 104 MMOL/L (ref 98–107)
CO2 SERPL-SCNC: 27.3 MMOL/L (ref 22–29)
CREAT SERPL-MCNC: 0.87 MG/DL (ref 0.57–1)
DEPRECATED RDW RBC AUTO: 48.8 FL (ref 37–54)
EGFRCR SERPLBLD CKD-EPI 2021: 72.2 ML/MIN/1.73
EOSINOPHIL # BLD AUTO: 0.18 10*3/MM3 (ref 0–0.4)
EOSINOPHIL NFR BLD AUTO: 1.9 % (ref 0.3–6.2)
ERYTHROCYTE [DISTWIDTH] IN BLOOD BY AUTOMATED COUNT: 15.4 % (ref 12.3–15.4)
GLOBULIN UR ELPH-MCNC: 2.4 GM/DL
GLUCOSE SERPL-MCNC: 91 MG/DL (ref 65–99)
HCT VFR BLD AUTO: 42.1 % (ref 34–46.6)
HGB BLD-MCNC: 13.4 G/DL (ref 12–15.9)
LDH SERPL-CCNC: 321 U/L (ref 135–214)
LYMPHOCYTES # BLD AUTO: 2.23 10*3/MM3 (ref 0.7–3.1)
LYMPHOCYTES NFR BLD AUTO: 23 % (ref 19.6–45.3)
MAGNESIUM SERPL-MCNC: 2.1 MG/DL (ref 1.6–2.4)
MCH RBC QN AUTO: 27.7 PG (ref 26.6–33)
MCHC RBC AUTO-ENTMCNC: 31.8 G/DL (ref 31.5–35.7)
MCV RBC AUTO: 87.2 FL (ref 79–97)
MONOCYTES # BLD AUTO: 1.03 10*3/MM3 (ref 0.1–0.9)
MONOCYTES NFR BLD AUTO: 10.6 % (ref 5–12)
NEUTROPHILS NFR BLD AUTO: 6.11 10*3/MM3 (ref 1.7–7)
NEUTROPHILS NFR BLD AUTO: 62.9 % (ref 42.7–76)
PHOSPHATE SERPL-MCNC: 3.3 MG/DL (ref 2.5–4.5)
PLATELET # BLD AUTO: 92 10*3/MM3 (ref 140–450)
PMV BLD AUTO: 10.6 FL (ref 6–12)
POTASSIUM SERPL-SCNC: 4.2 MMOL/L (ref 3.5–5.2)
PROT SERPL-MCNC: 6.1 G/DL (ref 6–8.5)
RBC # BLD AUTO: 4.83 10*6/MM3 (ref 3.77–5.28)
SODIUM SERPL-SCNC: 139 MMOL/L (ref 136–145)
WBC NRBC COR # BLD AUTO: 9.71 10*3/MM3 (ref 3.4–10.8)

## 2025-01-07 PROCEDURE — 36415 COLL VENOUS BLD VENIPUNCTURE: CPT

## 2025-01-07 PROCEDURE — 83615 LACTATE (LD) (LDH) ENZYME: CPT | Performed by: STUDENT IN AN ORGANIZED HEALTH CARE EDUCATION/TRAINING PROGRAM

## 2025-01-07 PROCEDURE — 84100 ASSAY OF PHOSPHORUS: CPT | Performed by: STUDENT IN AN ORGANIZED HEALTH CARE EDUCATION/TRAINING PROGRAM

## 2025-01-07 PROCEDURE — 80053 COMPREHEN METABOLIC PANEL: CPT | Performed by: STUDENT IN AN ORGANIZED HEALTH CARE EDUCATION/TRAINING PROGRAM

## 2025-01-07 PROCEDURE — 83735 ASSAY OF MAGNESIUM: CPT | Performed by: STUDENT IN AN ORGANIZED HEALTH CARE EDUCATION/TRAINING PROGRAM

## 2025-01-07 PROCEDURE — 85025 COMPLETE CBC W/AUTO DIFF WBC: CPT

## 2025-01-07 NOTE — OUTREACH NOTE
Medical Week 2 Survey      Flowsheet Row Responses   [facility] facility patient discharged from? [facility]   Does the patient have one of the following disease processes/diagnoses(primary or secondary)? Other   Week 2 attempt successful? No   Unsuccessful attempts Attempt 1            Donell BROOKS - Registered Nurse

## 2025-01-07 NOTE — TELEPHONE ENCOUNTER
Caller: Catarina Mclean    Relationship: Self    Best call back number: [phone]    What is the best time to reach you: ANYTIME    Who are you requesting to speak with (clinical staff, provider,  specific staff member):     What was the call regarding: PATIENT MISSED HER 1/6 LAB APPT DUE TO OFFICE BEING CLOSED FOR SNOW. PLEASE CALL ASAP TO SCHEDULE FOR TODAY OR TOMORROW.

## 2025-01-08 DIAGNOSIS — C85.80 LYMPHOMA MALIGNANT, LARGE CELL: Primary | ICD-10-CM

## 2025-01-08 DIAGNOSIS — C83.31 DIFFUSE LARGE B-CELL LYMPHOMA, LYMPH NODES OF HEAD, FACE, AND NECK: Primary | ICD-10-CM

## 2025-01-08 RX ORDER — PALONOSETRON 0.05 MG/ML
0.25 INJECTION, SOLUTION INTRAVENOUS ONCE
OUTPATIENT
Start: 2025-01-15

## 2025-01-08 RX ORDER — SODIUM CHLORIDE 9 MG/ML
20 INJECTION, SOLUTION INTRAVENOUS ONCE
OUTPATIENT
Start: 2025-01-17

## 2025-01-08 RX ORDER — SODIUM CHLORIDE 9 MG/ML
20 INJECTION, SOLUTION INTRAVENOUS ONCE
OUTPATIENT
Start: 2025-01-16

## 2025-01-08 RX ORDER — SODIUM CHLORIDE 9 MG/ML
20 INJECTION, SOLUTION INTRAVENOUS ONCE
OUTPATIENT
Start: 2025-01-15

## 2025-01-08 NOTE — PROGRESS NOTES
HEMATOLOGY ONCOLOGY OUTPATIENT FOLLOW UP       Patient name: Catarina Mclean  : 1955  MRN: [id number]  Primary Care Physician: Bozena Alamo NP  Referring Physician: No ref. provider found  Reason For Consult:       History of Present Illness:  Patient is a 69-year-old female who has been referred to us for further evaluation and management of diffuse lymphadenopathy.    She reported having symptoms of worsening fatigue, poor appetite intermittent night sweats and palpable axillary adenopathy approximately 2 months ago.  She was seen by her PCP for the symptoms and was empirically treated with oral antibiotics with limited improvement in her symptoms.      Bilateral breast screening mammogram in 2024 was reported unremarkable.  [BI-RADS 1]    Subsequently a CT chest abdomen pelvis were ordered and is reported as follows:  2024: CT chest/abdomen/pelvis:  Impression:  1.Supra diaphragmatic and infra diaphragmatic lymphadenopathy concerning for lymphoma.     Her past medical history significant for non-Hodgkin lymphoma [likely DLBCL], diagnosed in -10  She reported that she was being followed by Dr. Barkley and had systemic therapy for lymphoma in [id number].  She did not follow-up with oncologist thereafter and was lost to follow-up.  Patient is unable to provide any additional details about her diagnosis or treatment.    Limited prior medical records from  were reviewed:    Patient underwent bilateral tonsillectomy in 2009,  Pathology: Left tonsil was reported to have involvement from malignant lymphoma, large cell type of B-cell origin.  [Of activated B-cell phenotype].  IHC staining was reported positive for CD45, CD20, Bcl-2 and MUM1 with Ki-67 98%.  The tumor was negative for BEV.    A bone marrow biopsy was reported negative for lymphoma involvement at that time.    Patient stated that she is up-to-date whether age-appropriate cancer screening  including annual screening mammograms, Pap smears and colonoscopies.  Last colonoscopy was approximately 10 years ago and was reported normal per patient.    Family history significant for unknown cancer in brother who has been recently diagnosed..      Patient presents for initial consultation today.  She reported having symptoms of generalized fatigue, poor appetite and palpable axillary adenopathy in right axilla and right supraclavicular area for last few weeks.  Denied any other respiratory or GI/ symptoms.  She reported having occasional night sweats but denied any weight loss or fever/chills.  No recent infections reported.    11/20/24: Lymph node, right axillary:  Large B-cell lymphoma  Immunohistochemistry  There is predominance of intermediate to large sized CD45 and CD20+ B-lymphocytes. CD3 and CD5 reveal numerous small T-lymphocytes. B-cells are negative for CD5, CD10, CD15, CD30, and BEV. They are positive for PAX-5, BCL-2, BCL-6, and MUM-1, as well as c-MYC (~40% of neoplastic cells). Ki-67 shows high proliferative rate (50-60%).    Flow Cytometry: Abnormal/ monotypic B-cell population (4% of sample)  Comment: Scatter properties compatible with increased cell size, cells characterized as:  CD45+, CD19+, CD20+, CD5-, CD10-, CD23-, FMC7-, CD30-, CD38-, CD43-/+, HLA  DR+, sIg lambda+    FISH PANEL: Abnormal Lymphoma FISH panel  Aneuploidy: gain of BCL6/3q, MYC/8q, and BCL2/18q  Additional IGH/14q    12/3/24: PET/CT:  Impression:  1. Hypermetabolic left cervical chain, bilateral supraclavicular, right axillary, right subpectoral, portacaval and retroperitoneal adenopathy consistent with known lymphoma.  2. Two small hypermetabolic splenic lesions likely also related to lymphoma. No splenomegaly.  3. Hypermetabolic osseous uptake associated with C3 vertebral body and right posterior 12th rib likely osseous involvement of lymphoma.  4. Additional incidental CT findings above.    12/10/24: Patient seen  today for follow-up visit to discuss her biopsy and imaging results.  She reported again having ongoing fatigue and decreased appetite.  Denied any new complaints today.    12/16/2024: Transthoracic echocardiogram:    Left ventricular ejection fraction appears to be 61 - 65%.    Left ventricular diastolic function is consistent with (grade Ia w/high LAP) impaired relaxation.    The left atrial cavity is dilated.    Estimated right ventricular systolic pressure from tricuspid regurgitation is normal (<35 mmHg).    No significant valvular abnormalities noted.    Extracardiac findings: Periportal lymph nodes are noted.      12/23/2024: Patient seen today for follow-up.  Has petechial rash on her chest and extremities which is new.  Has some ongoing fatigue but otherwise stable    12/25/24- 12/2724:   The patient was admitted to [facility] hospital with complaint of rash which began on face and spread to the chest. She was also noted to have thrombocytopenia with Plt count jacqueline at 58K. She was started on steroids and Atarax with improvement of rash and plt counts. The patient was discharged home on Medrol dose pack as well as Benadryl as needed in view of the rash.     Subjective:  1/10/25: Patient seen today for follow-up visit.  She reported clinically doing better, reported improved appetite and good energy levels today.  She was on tapering steroids since her hospitalization which she has completed approximately 4-5 days ago.  No new complaints reported.  Stated that her palpable supraclavicular and right axillary adenopathy has improved.  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    Past Medical History:   Diagnosis Date    Drug therapy     Hyperlipidemia     Hypertension     Non Hodgkin's lymphoma 2009       Past Surgical History:   Procedure Laterality Date    BREAST  BIOPSY      HYSTERECTOMY           Current Outpatient Medications:     acyclovir (ZOVIRAX) 400 MG tablet, Take 1 tablet by mouth 2 (Two) Times a Day. Take no more than 5 doses a day., Disp: 60 tablet, Rfl: 5    allopurinol (ZYLOPRIM) 300 MG tablet, TAKE 1 TABLET BY MOUTH DAILY, Disp: 30 tablet, Rfl: 0    amLODIPine (NORVASC) 5 MG tablet, Take 1 tablet by mouth Daily., Disp: , Rfl:     aspirin 81 MG chewable tablet, Chew Daily., Disp: , Rfl:     carvedilol (COREG) 6.25 MG tablet, Take 1 tablet by mouth 2 (Two) Times a Day., Disp: 180 tablet, Rfl: 3    diphenhydrAMINE (Benadryl Allergy) 25 MG tablet, Take 1 tablet by mouth Every 8 (Eight) Hours As Needed for Itching (Rash)., Disp: 9 tablet, Rfl: 0    empagliflozin (Jardiance) 10 MG tablet tablet, Take 1 tablet by mouth Daily., Disp: 35 tablet, Rfl: 0    lisinopril (PRINIVIL,ZESTRIL) 20 MG tablet, Take 1 tablet by mouth Daily., Disp: , Rfl:     methylPREDNISolone (MEDROL) 4 MG dose pack, Take as directed on package instructions., Disp: 21 tablet, Rfl: 0    Omega-3 Fatty Acids (fish oil) 1000 MG capsule capsule, Take 2 capsules by mouth Daily With Breakfast., Disp: , Rfl:     pravastatin (PRAVACHOL) 20 MG tablet, Take 1 tablet by mouth Daily., Disp: , Rfl:     traZODone (DESYREL) 50 MG tablet, Take 2 tablets by mouth Every Night., Disp: , Rfl:     No Known Allergies    Family History   Problem Relation Age of Onset    Liver cancer Brother     Breast cancer Maternal Aunt        Cancer-related family history includes Breast cancer in her maternal aunt; Liver cancer in her brother.      Social History     Tobacco Use    Smoking status: Never     Passive exposure: Never    Smokeless tobacco: Never   Vaping Use    Vaping status: Never Used   Substance Use Topics    Alcohol use: Yes     Alcohol/week: 10.0 standard drinks of alcohol     Types: 10 Cans of beer per week     Comment: WEEKLY    Drug use: Never     Social History     Social History Narrative    Not on file        ROS:   Review of Systems   Constitutional:  Positive for activity change, appetite change and fatigue.   HENT: Negative.     Eyes: Negative.    Respiratory: Negative.     Cardiovascular: Negative.    Gastrointestinal: Negative.    Endocrine: Negative.    Genitourinary: Negative.    Musculoskeletal: Negative.    Skin: Negative.    Allergic/Immunologic: Negative.    Neurological: Negative.    Hematological: Negative.    Psychiatric/Behavioral: Negative.           Objective:    Vital Signs:  There were no vitals filed for this visit.      There is no height or weight on file to calculate BMI.    ECOG  (1) Restricted in physically strenuous activity, ambulatory and able to do work of light nature    Physical Exam:   Physical Exam  Constitutional:       Appearance: Normal appearance. She is normal weight.   HENT:      Head: Normocephalic and atraumatic.      Right Ear: External ear normal.      Left Ear: External ear normal.      Nose: Nose normal.      Mouth/Throat:      Mouth: Mucous membranes are moist.      Pharynx: Oropharynx is clear.   Eyes:      Extraocular Movements: Extraocular movements intact.      Conjunctiva/sclera: Conjunctivae normal.      Pupils: Pupils are equal, round, and reactive to light.   Cardiovascular:      Rate and Rhythm: Normal rate.      Pulses: Normal pulses.   Pulmonary:      Effort: Pulmonary effort is normal.   Abdominal:      General: Abdomen is flat.      Palpations: Abdomen is soft.   Musculoskeletal:         General: Normal range of motion.      Cervical back: Normal range of motion and neck supple.   Lymphadenopathy:      Upper Body:      Right upper body: Supraclavicular adenopathy and axillary adenopathy present.   Skin:     General: Skin is warm.   Neurological:      General: No focal deficit present.      Mental Status: She is alert and oriented to person, place, and time.   Psychiatric:         Mood and Affect: Mood normal.         Behavior: Behavior normal.          "Thought Content: Thought content normal.         Judgment: Judgment normal.         Lab Results - Last 18 Months   Lab Units 01/07/25  1246 12/27/24  0315 12/26/24  0408   WBC 10*3/mm3 9.71 7.84 3.99   HEMOGLOBIN g/dL 13.4 12.0 12.1   HEMATOCRIT % 42.1 37.2 37.8   PLATELETS 10*3/mm3 92* 76* 95*   MCV fL 87.2 84.7 85.3     Lab Results - Last 18 Months   Lab Units 01/07/25  1246 12/27/24  0315 12/26/24  0408 12/25/24  1130 12/23/24  1511   SODIUM mmol/L 139 140 138 137 136   POTASSIUM mmol/L 4.2 4.3 4.2 4.0 4.1   CHLORIDE mmol/L 104 106 104 103 100   CO2 mmol/L 27.3 23.1 21.2* 22.8 26.0   BUN mg/dL 10 15 14 13 10   CREATININE mg/dL 0.87 0.84 0.89 1.14* 1.26*   CALCIUM mg/dL 9.6 9.9 9.7 9.7 10.1   BILIRUBIN mg/dL 1.1  --   --  0.9 1.0   ALK PHOS U/L 67  --   --  99 89   ALT (SGPT) U/L 23  --   --  22 21   AST (SGOT) U/L 25  --   --  31 29   GLUCOSE mg/dL 91 154* 156* 147* 98       Lab Results   Component Value Date    GLUCOSE 91 01/07/2025    BUN 10 01/07/2025    CREATININE 0.87 01/07/2025    BCR 11.5 01/07/2025    K 4.2 01/07/2025    CO2 27.3 01/07/2025    CALCIUM 9.6 01/07/2025    ALBUMIN 3.7 01/07/2025    AST 25 01/07/2025    ALT 23 01/07/2025       Lab Results - Last 18 Months   Lab Units 12/25/24  1130   INR  1.05   APTT seconds 35.8*       No results found for: \"IRON\", \"TIBC\", \"FERRITIN\"    No results found for: \"FOLATE\"    No results found for: \"OCCULTBLD\"    No results found for: \"RETICCTPCT\"  No results found for: \"WXRNLCDR03\"  No results found for: \"SPEP\", \"UPEP\"  LDH   Date Value Ref Range Status   01/07/2025 321 (H) 135 - 214 U/L Final     Comment:     Specimen hemolyzed.  Results may be affected.     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  Patient has remote history of aggressive NHL, status post chemotherapy in 2009-10.  -CT chest/abdomen/pelvis reviewed, noted to have extensive lymphadenopathy involving supraclavicular, axillary and intra-abdominal lymph nodes.  No solid organ masses noted concerning for solid organ metastasis.  -Axillary lymph node biopsy and PET/CT findings reviewed as above.  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  Ki-67 is elevated at 50-60%  -I reviewed these findings with patient in detail.  Given extensive disease and aggressive disease biology, she was initially being considered for dose adjusted R-EPOCH regimen, I also discussed her case with [facility] BMT attending Dr. Zuleta. Due to concern about prior exposure to anthracyclines during at time of her initial diagnosis and treatment in 2009-10, repeat treatment with anthracycline based regimen may carry risk of significant cardiotoxicity.  She has been since evaluated at [facility] BMT clinic by Dr. Hirsch who recommended to start patient on R-CEOP regimen for total 6 cycles replacing Doxorubicin with Etoposide.     She will also need CNS prophylaxis  with IT methotrexate with systemic therapy given high risk for CNS involvement [CNS IPI 4, associated with high risk of CNS involvement].  -This plan was discussed in detail with patient today.  Potential treatment related adverse effects were explained to her.  She verbalized understanding and is agreeable to " treatment.   -She is scheduled for port placement on 1/16/25.  Plan to start chemotherapy on 1/20/25.        Cardiac health: Detailed medical records pertaining to her prior cancer treatment are not available, however it seems she had outpatient chemotherapy with R-CHOP or similar regimen containing anthracyclines..  Will try to obtain these records but there is significant concern about patient crossing the maximum recommended lifetime dose for anthracyclines with her planned lymphoma treatment.  -Discussed her case with cardiology [Dr. García].  She was referred to cardiology clinic to discuss cardiac risk reduction and to start prophylactic treatment.   -Initial echocardiogram reported normal with EF 61-65%  She has been started on Coreg and Jardiance.       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  Will continue throughout treatment    ID: Started patient on antiviral and PCP prophylaxis with acyclovir and Bactrim. Bactrim has been held due to concern about drug induced rash and thrombocytopenia.     Skin Rash:   Thrombocytopenia:  This has improved. Patient is s/p completion of tapering steroids.    Will plan for weekly labs while on treatment, follow up on 1/20/25, sooner as needed.      Thank you very much for providing the opportunity to participate in this patient’s care. Please do not hesitate to call if there are any other questions.

## 2025-01-10 ENCOUNTER — OFFICE VISIT (OUTPATIENT)
Dept: ONCOLOGY | Facility: CLINIC | Age: 70
End: 2025-01-10
Payer: MEDICARE

## 2025-01-10 VITALS
OXYGEN SATURATION: 98 % | HEIGHT: 62 IN | SYSTOLIC BLOOD PRESSURE: 151 MMHG | HEART RATE: 65 BPM | BODY MASS INDEX: 32.72 KG/M2 | WEIGHT: 177.8 LBS | DIASTOLIC BLOOD PRESSURE: 88 MMHG

## 2025-01-10 DIAGNOSIS — R59.1 LYMPHADENOPATHY: ICD-10-CM

## 2025-01-10 DIAGNOSIS — C83.31 DIFFUSE LARGE B-CELL LYMPHOMA, LYMPH NODES OF HEAD, FACE, AND NECK: Primary | ICD-10-CM

## 2025-01-10 DIAGNOSIS — Z85.72 HISTORY OF NON-HODGKIN'S LYMPHOMA: ICD-10-CM

## 2025-01-10 DIAGNOSIS — R59.0 AXILLARY LYMPHADENOPATHY: ICD-10-CM

## 2025-01-10 RX ORDER — SULFAMETHOXAZOLE AND TRIMETHOPRIM 800; 160 MG/1; MG/1
TABLET ORAL
COMMUNITY
Start: 2024-12-31

## 2025-01-10 RX ORDER — FLUCONAZOLE 150 MG/1
TABLET ORAL
COMMUNITY
Start: 2025-01-06

## 2025-01-13 ENCOUNTER — TELEPHONE (OUTPATIENT)
Dept: CARDIOLOGY | Facility: CLINIC | Age: 70
End: 2025-01-13
Payer: MEDICARE

## 2025-01-13 NOTE — PROGRESS NOTES
Ms. Mclean has aggressive B-cell lymphoma with high CNS-IPI score, IT MTX 12mg (D4) Q21D x 6Cs added as per Dr. Sethi's order.   Thanks,  Hermelinda Smart PharmD, BCOP, CSP

## 2025-01-13 NOTE — TELEPHONE ENCOUNTER
Patient stopped in the office today to inquire about samples of Jardiance. She was previously given samples last month so we cannot give her any more samples at this time. She states she cannot afford Jardiance or Farxiga. I will discontinue Jardiance.

## 2025-01-14 ENCOUNTER — READMISSION MANAGEMENT (OUTPATIENT)
Dept: CALL CENTER | Facility: HOSPITAL | Age: 70
End: 2025-01-14
Payer: MEDICARE

## 2025-01-14 ENCOUNTER — HOSPITAL ENCOUNTER (EMERGENCY)
Facility: HOSPITAL | Age: 70
Discharge: HOME | End: 2025-01-14
Attending: EMERGENCY MEDICINE
Payer: MEDICARE

## 2025-01-14 ENCOUNTER — TELEPHONE (OUTPATIENT)
Dept: ONCOLOGY | Facility: CLINIC | Age: 70
End: 2025-01-14
Payer: MEDICARE

## 2025-01-14 VITALS
BODY MASS INDEX: 32.57 KG/M2 | TEMPERATURE: 99.3 F | HEIGHT: 62 IN | DIASTOLIC BLOOD PRESSURE: 68 MMHG | WEIGHT: 177 LBS | HEART RATE: 71 BPM | OXYGEN SATURATION: 95 % | RESPIRATION RATE: 16 BRPM | SYSTOLIC BLOOD PRESSURE: 139 MMHG

## 2025-01-14 DIAGNOSIS — T78.40XA ALLERGIC REACTION, INITIAL ENCOUNTER: Primary | ICD-10-CM

## 2025-01-14 PROCEDURE — 96374 THER/PROPH/DIAG INJ IV PUSH: CPT

## 2025-01-14 PROCEDURE — 96375 TX/PRO/DX INJ NEW DRUG ADDON: CPT

## 2025-01-14 PROCEDURE — 25010000002 DIPHENHYDRAMINE PER 50 MG: Performed by: EMERGENCY MEDICINE

## 2025-01-14 PROCEDURE — 25010000002 METHYLPREDNISOLONE PER 125 MG: Performed by: EMERGENCY MEDICINE

## 2025-01-14 PROCEDURE — 25010000002 ONDANSETRON PER 1 MG

## 2025-01-14 PROCEDURE — 99283 EMERGENCY DEPT VISIT LOW MDM: CPT

## 2025-01-14 RX ORDER — ONDANSETRON 2 MG/ML
4 INJECTION INTRAMUSCULAR; INTRAVENOUS ONCE
Status: COMPLETED | OUTPATIENT
Start: 2025-01-14 | End: 2025-01-14

## 2025-01-14 RX ORDER — METHYLPREDNISOLONE SODIUM SUCCINATE 125 MG/2ML
125 INJECTION, POWDER, LYOPHILIZED, FOR SOLUTION INTRAMUSCULAR; INTRAVENOUS ONCE
Status: COMPLETED | OUTPATIENT
Start: 2025-01-14 | End: 2025-01-14

## 2025-01-14 RX ORDER — SODIUM CHLORIDE 0.9 % (FLUSH) 0.9 %
10 SYRINGE (ML) INJECTION AS NEEDED
Status: DISCONTINUED | OUTPATIENT
Start: 2025-01-14 | End: 2025-01-14 | Stop reason: HOSPADM

## 2025-01-14 RX ORDER — DIPHENHYDRAMINE HYDROCHLORIDE 50 MG/ML
25 INJECTION INTRAMUSCULAR; INTRAVENOUS ONCE
Status: COMPLETED | OUTPATIENT
Start: 2025-01-14 | End: 2025-01-14

## 2025-01-14 RX ORDER — ONDANSETRON 2 MG/ML
INJECTION INTRAMUSCULAR; INTRAVENOUS
Status: COMPLETED
Start: 2025-01-14 | End: 2025-01-14

## 2025-01-14 RX ORDER — FAMOTIDINE 10 MG/ML
20 INJECTION, SOLUTION INTRAVENOUS ONCE
Status: COMPLETED | OUTPATIENT
Start: 2025-01-14 | End: 2025-01-14

## 2025-01-14 RX ADMIN — METHYLPREDNISOLONE SODIUM SUCCINATE 125 MG: 125 INJECTION, POWDER, FOR SOLUTION INTRAMUSCULAR; INTRAVENOUS at 20:18

## 2025-01-14 RX ADMIN — FAMOTIDINE 20 MG: 10 INJECTION INTRAVENOUS at 20:18

## 2025-01-14 RX ADMIN — ONDANSETRON 4 MG: 2 INJECTION INTRAMUSCULAR; INTRAVENOUS at 21:05

## 2025-01-14 RX ADMIN — DIPHENHYDRAMINE HYDROCHLORIDE 25 MG: 50 INJECTION, SOLUTION INTRAMUSCULAR; INTRAVENOUS at 20:18

## 2025-01-14 NOTE — TELEPHONE ENCOUNTER
Ann Marie emailed me back regarding medical records for this patient.  She stated that they did not have any records for this patient with Dr. Nelson.

## 2025-01-15 NOTE — OUTREACH NOTE
Medical Week 3 Survey      Flowsheet Row Responses   [facility] patient discharged from? [facility]   Does the patient have one of the following disease processes/diagnoses(primary or secondary)? Other   Week 3 attempt successful? Yes   Call start time 1909   Call end time 1915   Discharge diagnosis Thrombocytopenia   Meds reviewed with patient/caregiver? Yes   Is the patient having any side effects they believe may be caused by any medication additions or changes? No   Does the patient have all medications ordered at discharge? Yes   Is the patient taking all medications as directed (includes completed medication regime)? No   Nursing Interventions Nurse provided patient education   Medication comments Pt states that she took an antibiotic by mistake and now her throat feels funny and lips are swelling. She said it was a medication that she had an allergic reaction to.   Advised to go to ED.  States she will go.   Does the patient have a primary care provider?  Yes   What is the patient's perception of their health status since discharge? New symptoms unrelated to diagnosis   Additional teach back comments Pt took an antibiotic she had laying around by mistake with her other meds and took it.  Now have some lips swelling a throat feels funny.  Advised to go to ED ASAP.  States she will go.   Week 3 Call Completed? Yes   Wrap up additional comments Pt to go to ED due allergic reaction to medication.   Call end time 1915            Zo ENCARNACION - Licensed Nurse

## 2025-01-15 NOTE — ED PROVIDER NOTES
Subjective   History of Present Illness  Chief complaint: Allergic reaction    69-year-old female presents with an allergic reaction.  Patient states she was given a prescription for Bactrim about 3 weeks ago from her oncologist.  After starting it she developed itching and a rash and she was taken off of the Bactrim.  She states she had her medicine out this evening around 5 PM and was taking her regular medicine.  She states she had the Bactrim out as well because she was going to throw it away but she accidentally took one of them as she was taking her other medicine.  Since then she has had the sensation of swelling of her lips and some tingling when she swallows.  She denies any rash.  She has had no difficulty breathing.  She denies any other complaints.    History provided by:  Patient      Review of Systems   Constitutional:  Negative for fever.   HENT:  Negative for congestion.    Respiratory:  Negative for cough and shortness of breath.    Cardiovascular:  Negative for chest pain.   Gastrointestinal:  Negative for abdominal pain and vomiting.   Musculoskeletal:  Negative for back pain.   Neurological:  Negative for headaches.   Psychiatric/Behavioral:  Negative for confusion.        Past Medical History:   Diagnosis Date    Drug therapy     Hyperlipidemia     Hypertension     Non Hodgkin's lymphoma 2009       Allergies   Allergen Reactions    Sulfamethoxazole-Trimethoprim Swelling       Past Surgical History:   Procedure Laterality Date    BREAST BIOPSY      HYSTERECTOMY         Family History   Problem Relation Age of Onset    Liver cancer Brother     Breast cancer Maternal Aunt        Social History     Socioeconomic History    Marital status:    Tobacco Use    Smoking status: Never     Passive exposure: Never    Smokeless tobacco: Never   Vaping Use    Vaping status: Never Used   Substance and Sexual Activity    Alcohol use: Yes     Alcohol/week: 10.0 standard drinks of alcohol     Types: 10 Cans  "of beer per week     Comment: WEEKLY    Drug use: Never    Sexual activity: Defer       /58   Pulse 75   Temp 99.3 °F (37.4 °C) (Oral)   Resp 16   Ht 157.5 cm (62\")   Wt 80.3 kg (177 lb)   SpO2 98%   BMI 32.37 kg/m²       Objective   Physical Exam  Vitals and nursing note reviewed.   Constitutional:       Appearance: Normal appearance.   HENT:      Head: Normocephalic and atraumatic.      Mouth/Throat:      Mouth: Mucous membranes are moist.      Comments: There is no appreciable swelling of the lips or tongue.  There is no swelling in the posterior oropharynx.  Eyes:      Pupils: Pupils are equal, round, and reactive to light.   Cardiovascular:      Rate and Rhythm: Normal rate and regular rhythm.      Heart sounds: Normal heart sounds.   Pulmonary:      Effort: Pulmonary effort is normal. No respiratory distress.      Breath sounds: Normal breath sounds.   Abdominal:      Palpations: Abdomen is soft.      Tenderness: There is no abdominal tenderness.   Skin:     General: Skin is warm and dry.      Findings: No rash.   Neurological:      Mental Status: She is alert.         Procedures           ED Course                                                       Medical Decision Making    IV access was established and the patient was given Solu-Medrol, Benadryl, Pepcid.  She did have 1 episode of vomiting.  She was given Zofran.  She was observed in the emergency room and states she is feeling better.  She is having no difficulty breathing or swallowing.  She is well-appearing on exam.  She will be discharged to follow-up with her primary doctor.      Final diagnoses:   Allergic reaction, initial encounter       ED Disposition  ED Disposition       ED Disposition   Discharge    Condition   Stable    Comment   --               Bozena Alamo NP  [address] [facility] [place] [facility]  [phone]    Call in 2 days           Medication List      No changes were made to your prescriptions during this " visit.            Willie Kirkpatrick MD  01/14/25 1256

## 2025-01-16 ENCOUNTER — HOSPITAL ENCOUNTER (OUTPATIENT)
Dept: INTERVENTIONAL RADIOLOGY/VASCULAR | Facility: HOSPITAL | Age: 70
Discharge: HOME | End: 2025-01-16
Payer: MEDICARE

## 2025-01-16 VITALS
TEMPERATURE: 97.7 F | WEIGHT: 177 LBS | RESPIRATION RATE: 10 BRPM | BODY MASS INDEX: 32.57 KG/M2 | OXYGEN SATURATION: 97 % | SYSTOLIC BLOOD PRESSURE: 124 MMHG | HEIGHT: 62 IN | HEART RATE: 52 BPM | DIASTOLIC BLOOD PRESSURE: 72 MMHG

## 2025-01-16 DIAGNOSIS — C83.31 DIFFUSE LARGE B-CELL LYMPHOMA, LYMPH NODES OF HEAD, FACE, AND NECK: ICD-10-CM

## 2025-01-16 LAB
APTT PPP: 30.2 SECONDS (ref 22.7–35.4)
BASOPHILS # BLD AUTO: 0.04 10*3/MM3 (ref 0–0.2)
BASOPHILS NFR BLD AUTO: 0.3 % (ref 0–1.5)
DEPRECATED RDW RBC AUTO: 51.3 FL (ref 37–54)
EOSINOPHIL # BLD AUTO: 0.24 10*3/MM3 (ref 0–0.4)
EOSINOPHIL NFR BLD AUTO: 2.1 % (ref 0.3–6.2)
ERYTHROCYTE [DISTWIDTH] IN BLOOD BY AUTOMATED COUNT: 16.3 % (ref 12.3–15.4)
HCT VFR BLD AUTO: 40.1 % (ref 34–46.6)
HGB BLD-MCNC: 12.7 G/DL (ref 12–15.9)
IMM GRANULOCYTES # BLD AUTO: 0.03 10*3/MM3 (ref 0–0.05)
IMM GRANULOCYTES NFR BLD AUTO: 0.3 % (ref 0–0.5)
INR PPP: 1.01 (ref 0.9–1.1)
LYMPHOCYTES # BLD AUTO: 1.29 10*3/MM3 (ref 0.7–3.1)
LYMPHOCYTES NFR BLD AUTO: 11.3 % (ref 19.6–45.3)
MCH RBC QN AUTO: 27.7 PG (ref 26.6–33)
MCHC RBC AUTO-ENTMCNC: 31.7 G/DL (ref 31.5–35.7)
MCV RBC AUTO: 87.6 FL (ref 79–97)
MONOCYTES # BLD AUTO: 0.86 10*3/MM3 (ref 0.1–0.9)
MONOCYTES NFR BLD AUTO: 7.5 % (ref 5–12)
MRSA DNA SPEC QL NAA+PROBE: NORMAL
NEUTROPHILS NFR BLD AUTO: 78.5 % (ref 42.7–76)
NEUTROPHILS NFR BLD AUTO: 8.97 10*3/MM3 (ref 1.7–7)
NRBC BLD AUTO-RTO: 0 /100 WBC (ref 0–0.2)
PLATELET # BLD AUTO: 57 10*3/MM3 (ref 140–450)
PMV BLD AUTO: 10.9 FL (ref 6–12)
PROTHROMBIN TIME: 13.4 SECONDS (ref 11.7–14.2)
RBC # BLD AUTO: 4.58 10*6/MM3 (ref 3.77–5.28)
WBC NRBC COR # BLD AUTO: 11.43 10*3/MM3 (ref 3.4–10.8)

## 2025-01-16 PROCEDURE — 76937 US GUIDE VASCULAR ACCESS: CPT

## 2025-01-16 PROCEDURE — 25010000002 MIDAZOLAM PER 1 MG

## 2025-01-16 PROCEDURE — 25010000002 LIDOCAINE 1% - EPINEPHRINE 1:100000 1 %-1:100000 SOLUTION

## 2025-01-16 PROCEDURE — 25010000002 FENTANYL CITRATE (PF) 50 MCG/ML SOLUTION

## 2025-01-16 PROCEDURE — 87641 MR-STAPH DNA AMP PROBE: CPT | Performed by: STUDENT IN AN ORGANIZED HEALTH CARE EDUCATION/TRAINING PROGRAM

## 2025-01-16 PROCEDURE — 25010000002 ONDANSETRON PER 1 MG

## 2025-01-16 PROCEDURE — 85610 PROTHROMBIN TIME: CPT | Performed by: RADIOLOGY

## 2025-01-16 PROCEDURE — 25010000002 LIDOCAINE 1 % SOLUTION

## 2025-01-16 PROCEDURE — 99153 MOD SED SAME PHYS/QHP EA: CPT

## 2025-01-16 PROCEDURE — C1894 INTRO/SHEATH, NON-LASER: HCPCS

## 2025-01-16 PROCEDURE — 99152 MOD SED SAME PHYS/QHP 5/>YRS: CPT

## 2025-01-16 PROCEDURE — C1769 GUIDE WIRE: HCPCS

## 2025-01-16 PROCEDURE — 77001 FLUOROGUIDE FOR VEIN DEVICE: CPT

## 2025-01-16 PROCEDURE — 85025 COMPLETE CBC W/AUTO DIFF WBC: CPT | Performed by: RADIOLOGY

## 2025-01-16 PROCEDURE — 25010000002 HEPARIN LOCK FLUSH PER 10 UNITS

## 2025-01-16 PROCEDURE — 85730 THROMBOPLASTIN TIME PARTIAL: CPT | Performed by: RADIOLOGY

## 2025-01-16 PROCEDURE — 25010000002 CEFAZOLIN PER 500 MG

## 2025-01-16 RX ORDER — MIDAZOLAM HYDROCHLORIDE 1 MG/ML
INJECTION, SOLUTION INTRAMUSCULAR; INTRAVENOUS AS NEEDED
Status: COMPLETED | OUTPATIENT
Start: 2025-01-16 | End: 2025-01-16

## 2025-01-16 RX ORDER — LIDOCAINE HYDROCHLORIDE 10 MG/ML
INJECTION, SOLUTION INFILTRATION; PERINEURAL AS NEEDED
Status: COMPLETED | OUTPATIENT
Start: 2025-01-16 | End: 2025-01-16

## 2025-01-16 RX ORDER — SODIUM CHLORIDE 0.9 % (FLUSH) 0.9 %
10 SYRINGE (ML) INJECTION EVERY 12 HOURS SCHEDULED
Status: DISCONTINUED | OUTPATIENT
Start: 2025-01-16 | End: 2025-01-17 | Stop reason: HOSPADM

## 2025-01-16 RX ORDER — ONDANSETRON 2 MG/ML
INJECTION INTRAMUSCULAR; INTRAVENOUS AS NEEDED
Status: COMPLETED | OUTPATIENT
Start: 2025-01-16 | End: 2025-01-16

## 2025-01-16 RX ORDER — LIDOCAINE HYDROCHLORIDE AND EPINEPHRINE 10; 10 MG/ML; UG/ML
INJECTION, SOLUTION INFILTRATION; PERINEURAL AS NEEDED
Status: COMPLETED | OUTPATIENT
Start: 2025-01-16 | End: 2025-01-16

## 2025-01-16 RX ORDER — HEPARIN SODIUM (PORCINE) LOCK FLUSH IV SOLN 100 UNIT/ML 100 UNIT/ML
SOLUTION INTRAVENOUS AS NEEDED
Status: COMPLETED | OUTPATIENT
Start: 2025-01-16 | End: 2025-01-16

## 2025-01-16 RX ORDER — FENTANYL CITRATE 50 UG/ML
INJECTION, SOLUTION INTRAMUSCULAR; INTRAVENOUS AS NEEDED
Status: COMPLETED | OUTPATIENT
Start: 2025-01-16 | End: 2025-01-16

## 2025-01-16 RX ORDER — SODIUM CHLORIDE 0.9 % (FLUSH) 0.9 %
10 SYRINGE (ML) INJECTION AS NEEDED
Status: DISCONTINUED | OUTPATIENT
Start: 2025-01-16 | End: 2025-01-17 | Stop reason: HOSPADM

## 2025-01-16 RX ADMIN — FENTANYL CITRATE 100 MCG: 50 INJECTION, SOLUTION INTRAMUSCULAR; INTRAVENOUS at 13:14

## 2025-01-16 RX ADMIN — Medication 500 UNITS: at 14:15

## 2025-01-16 RX ADMIN — FENTANYL CITRATE 100 MCG: 50 INJECTION, SOLUTION INTRAMUSCULAR; INTRAVENOUS at 13:46

## 2025-01-16 RX ADMIN — ONDANSETRON 4 MG: 2 INJECTION INTRAMUSCULAR; INTRAVENOUS at 13:07

## 2025-01-16 RX ADMIN — LIDOCAINE HYDROCHLORIDE,EPINEPHRINE BITARTRATE 8 ML: 10; .01 INJECTION, SOLUTION INFILTRATION; PERINEURAL at 13:57

## 2025-01-16 RX ADMIN — FENTANYL CITRATE 100 MCG: 50 INJECTION, SOLUTION INTRAMUSCULAR; INTRAVENOUS at 13:09

## 2025-01-16 RX ADMIN — LIDOCAINE HYDROCHLORIDE 1 ML: 10 INJECTION, SOLUTION INFILTRATION; PERINEURAL at 13:51

## 2025-01-16 RX ADMIN — MIDAZOLAM 1 MG: 1 INJECTION INTRAMUSCULAR; INTRAVENOUS at 13:46

## 2025-01-16 RX ADMIN — MIDAZOLAM 1 MG: 1 INJECTION INTRAMUSCULAR; INTRAVENOUS at 13:09

## 2025-01-16 RX ADMIN — LIDOCAINE HYDROCHLORIDE 4 ML: 10 INJECTION, SOLUTION INFILTRATION; PERINEURAL at 13:16

## 2025-01-16 RX ADMIN — CEFAZOLIN 2000 MG: 1 INJECTION, POWDER, FOR SOLUTION INTRAMUSCULAR; INTRAVENOUS at 12:47

## 2025-01-16 RX ADMIN — MIDAZOLAM 1 MG: 1 INJECTION INTRAMUSCULAR; INTRAVENOUS at 13:14

## 2025-01-16 NOTE — H&P
[facility]   Interventional Radiology H&P    Patient Name: Catarina Mclean  : 1955  MRN: [id number]  Primary Care Physician:  Bozena Alamo NP  Referring Physician: Can Sethi MD  Date of admission: 2025    Subjective   Subjective     HPI:  Catarina Mclean is a 69 y.o. female with diffuse large b-cell lymphoma.    Review of Systems:   Constitutional no fever,  no weight loss       Otolaryngeal no difficulty swallowing   Cardiovascular no chest pain   Pulmonary no cough, no sputum production   Gastrointestinal no constipation, no diarrhea                         Personal History       Past Medical/Surgical History:   Past Medical History:   Diagnosis Date    Drug therapy     Hyperlipidemia     Hypertension     Non Hodgkin's lymphoma 2009     Past Surgical History:   Procedure Laterality Date    BREAST BIOPSY      HYSTERECTOMY         Social History:  reports that she has never smoked. She has never been exposed to tobacco smoke. She has never used smokeless tobacco. She reports current alcohol use of about 10.0 standard drinks of alcohol per week. She reports that she does not use drugs.    Medications:  (Not in a hospital admission)    Current medications:  sodium chloride, 10 mL, Intravenous, Q12H      Current IV drips:       Allergies:  Allergies   Allergen Reactions    Sulfamethoxazole-Trimethoprim Swelling       Objective    Objective     Vitals:   Temp:  [97.7 °F (36.5 °C)] 97.7 °F (36.5 °C)  Heart Rate:  [62-67] 67  Resp:  [17] 17  BP: (120)/(60) 120/60      Physical Exam:   Constitutional: Awake, alert, No acute distress    Respiratory: Clear to auscultation bilaterally, nonlabored respirations    Cardiovascular: RRR, no murmurs, rubs, or gallops, palpable pedal pulses bilaterally   Gastrointestinal: Positive bowel sounds, soft, nontender, nondistended        ASA SCALE ASSESSMENT:  2-Mild to moderate systemic disease, medically well controlled, with no functional  "limitation    MALLAMPATI CLASSIFICATION:  2-Able to visualize the soft palate, fauces, uvula. The anterior & posterior tonsilar pillars are hidden by the tongue.       Result Review        Result Review:     No results found for: \"NA\"    No results found for: \"K\"    No results found for: \"CL\"    No results found for: \"PLASMABICARB\"    No results found for: \"BUN\"    No results found for: \"CREATININE\"    No results found for: \"CALCIUM\"        No components found for: \"GLUCOSE.*\"  Results from last 7 days   Lab Units 01/16/25  1106   WBC 10*3/mm3 11.43*   HEMOGLOBIN g/dL 12.7   HEMATOCRIT % 40.1   PLATELETS 10*3/mm3 57*      Results from last 7 days   Lab Units 01/16/25  1106   INR  1.01           Assessment / Plan     Assesment:   Diffuse large b-cell lymphoma.      Plan:   Port placement.    The risks and benefits of the procedure were discussed with the patient.    Electronically signed by NITIN Ellis, 01/16/25, 12:51 PM EST.  " 170.18

## 2025-01-17 ENCOUNTER — TELEPHONE (OUTPATIENT)
Dept: ONCOLOGY | Facility: CLINIC | Age: 70
End: 2025-01-17
Payer: MEDICARE

## 2025-01-17 RX ORDER — FAMOTIDINE 10 MG/ML
20 INJECTION, SOLUTION INTRAVENOUS AS NEEDED
Status: CANCELLED | OUTPATIENT
Start: 2025-01-21

## 2025-01-17 RX ORDER — HYDROCORTISONE SODIUM SUCCINATE 100 MG/2ML
100 INJECTION INTRAMUSCULAR; INTRAVENOUS AS NEEDED
Status: CANCELLED | OUTPATIENT
Start: 2025-01-21

## 2025-01-17 RX ORDER — MEPERIDINE HYDROCHLORIDE 25 MG/ML
25 INJECTION INTRAMUSCULAR; INTRAVENOUS; SUBCUTANEOUS
Status: CANCELLED | OUTPATIENT
Start: 2025-01-21

## 2025-01-17 RX ORDER — SODIUM CHLORIDE 9 MG/ML
20 INJECTION, SOLUTION INTRAVENOUS ONCE
Status: CANCELLED | OUTPATIENT
Start: 2025-01-21

## 2025-01-17 RX ORDER — SODIUM CHLORIDE 9 MG/ML
20 INJECTION, SOLUTION INTRAVENOUS ONCE
Status: CANCELLED | OUTPATIENT
Start: 2025-01-23

## 2025-01-17 RX ORDER — PALONOSETRON 0.05 MG/ML
0.25 INJECTION, SOLUTION INTRAVENOUS ONCE
Status: CANCELLED | OUTPATIENT
Start: 2025-01-21

## 2025-01-17 RX ORDER — ACETAMINOPHEN 325 MG/1
650 TABLET ORAL ONCE
Status: CANCELLED | OUTPATIENT
Start: 2025-01-21

## 2025-01-17 RX ORDER — SODIUM CHLORIDE 9 MG/ML
20 INJECTION, SOLUTION INTRAVENOUS ONCE
Status: CANCELLED | OUTPATIENT
Start: 2025-01-22

## 2025-01-17 RX ORDER — DIPHENHYDRAMINE HYDROCHLORIDE 50 MG/ML
50 INJECTION INTRAMUSCULAR; INTRAVENOUS AS NEEDED
Status: CANCELLED | OUTPATIENT
Start: 2025-01-21

## 2025-01-17 NOTE — TELEPHONE ENCOUNTER
Spoke to patient informed her that when she comes in for education on Monday they will send in her prescriptions and go over how she should take them

## 2025-01-17 NOTE — TELEPHONE ENCOUNTER
Caller: Catarina Mclean    Relationship: Self    Best call back number: [phone]    What was the call regarding: PATIENT HAD PORT PUT IN ON 1/16/2025, NEED THE CREAM FOR THE PORT AND ANY OTHER SCRIPTS SHE WILL NEED FOR HER CHEMO INFUSIONS STARTING 1/21/2024      SEND TO [address]

## 2025-01-17 NOTE — PROGRESS NOTES
TREATMENT  PREPARATION    Catarina Mclean  [id number]  1955    Chief Complaint: Treatment preparation and needs assessment    History of present illness:  Catarina Mclean is a 69 y.o. year old female who is here today for treatment preparation and needs assessment.  The patient has been diagnosed with   Encounter Diagnosis   Name Primary?    Lymphoma malignant, large cell Yes    and is scheduled to begin treatment with:     Oncology History:    Oncology/Hematology History   Lymphoma malignant, large cell   12/12/2024 Initial Diagnosis    Lymphoma malignant, large cell     12/19/2024 - 12/19/2024 Chemotherapy    IP LYMPHOMA R-EPOCH (Dose Adjusted) RiTUXimab / Etoposide / DOXOrubicin / VinCRIStine / CycloPHOSphamide / PredniSONE     1/15/2025 - 1/15/2025 Chemotherapy    OP LYMPHOMA R-CEOP Rituximab/Cyclophosphamide / Etoposide / VinCRIStine / PredniSONE     1/21/2025 -  Chemotherapy    OP LYMPHOMA R-CEOP RiTUXimab / Cyclophosphamide / Etoposide / VinCRIStine / PredniSONE      1/24/2025 Biopsy    OP CNS Intra-CSF Methotrexate  Plan Provider: Can Sethi MD  Treatment goal: Control  Line of treatment: [No plan line of treatment]         The current medication list and allergy list were reviewed and reconciled.     Past Medical History, Past Surgical History, Social History, Family History have been reviewed and are without significant changes except as mentioned.    Physical Exam:    Vitals:    01/20/25 0828   BP: 119/81   Pulse: 67   Temp: 98 °F (36.7 °C)   SpO2: 98%     Vitals:    01/20/25 0828   PainSc: 0-No pain        ECOG score: 1             Physical Exam  Vitals reviewed.   Constitutional:       General: She is not in acute distress.     Appearance: Normal appearance. She is normal weight.   HENT:      Head: Normocephalic and atraumatic.   Pulmonary:      Effort: Pulmonary effort is normal. No respiratory distress.   Chest:      Comments: IVAD left chest  Musculoskeletal:         General: Normal  range of motion.      Cervical back: Normal range of motion.   Neurological:      Mental Status: She is alert and oriented to person, place, and time.   Psychiatric:         Mood and Affect: Mood normal.         Behavior: Behavior normal.           NEEDS ASSESSMENTS    Genetics  The patient's new diagnosis and family history have been reviewed for genetic counseling needs. The patient will not be referred..     Psychosocial and Barriers to care  The patient has completed a PHQ-9 Depression Screening and the Distress Thermometer (DT) today.  PHQ-9 results show PHQ-2 Total Score: PHQ-2 Total Score:   0  PHQ-9 Total Score: PHQ-9 Total Score:   0    Results were reviewed along with psychosocial resources offered by our cancer center.  Our Supportive Oncology team will be flagged for a score of 4 or above, and a same day call will be made for a score of 9 or 10.  A mental health referral is offered at that time. Patients who score less than 4 have been educated on our support services and can be referred to our  upon request.  The patient will not be referred to our .       Nutrition  The patient has completed the malnutrition screening today. They scored  0   with a score of 0-1 meaning not at risk in a score of 2 or greater meaning at risk.  Patients with a score of 3 or higher will be referred to our oncology dietitian for support. Patients beginning at risk treatment regimens or who have dietary concerns will also be referred to our oncology dietitian. The patient will not be referred.    Functional Assessment  Persons who are age 70 or greater will be screened for qualification of a comprehensive geriatric assessment by our survivorship nurse practitioner.  Older adults with cancer face unique challenges. These may include an increased risk of drug reactions, financial burdens, and caregiver stress. The patient scored   . Patients scoring 14 or lower will referred for an older adult  "functional assessment with the survivorship advanced practice registered nurse to ensure all needed support is provided as patients plan for their treatments. NOT APPLICABLE    Intravenous Access Assessment  The patient and I discussed planned intravenous chemo/biotherapy as well as other IV treatments that are often needed throughout the course of treatment. These may include, but are not limited to blood transfusions, antibiotics, and IV hydration. Discussed that depending on selected treatment and vein assessment, patient may require venous access device (VAD) which could include but not limited to a Mediport or PICC line. Risks and benefits of VADs reviewed. The patient will be treated via Port.    Reproductive/Sexual Activity   People should avoid becoming pregnant and should not get a partner pregnant while undergoing chemo/biotherapy.  People of childbearing age should use effective contraception during active therapy. The best recommendation for all people is to use a barrier method for a minimum of 1 week after the last infusion of chemo/biotherapy to prevent your partner being exposed to byproducts from treatment medications in bodily fluids. Effective contraception should be discussed with your oncology team to make sure it is safe to take based on your diagnosis. Possible options include oral contraceptives, barrier methods. Chemo/biotherapy can change your ability to reproduce children in the future.  There are options for fertility preservation. NOT APPLICABLE    Advanced Care Planning  Advance Care Planning   The patient and I discussed advanced care planning, \"Conversations that Matter\".   This service is offered for development of advance directives with a certified ACP facilitator.  The patient does not have an up-to-date advanced directive. This document is not on file with our office. The patient is not interested in an appointment with one of our facilitators to create or update their advanced " directives.               Smoking cessation  Tobacco Use: Low Risk  (1/20/2025)    Patient History     Smoking Tobacco Use: Never     Smokeless Tobacco Use: Never     Passive Exposure: Never       Patient and I discussed their tobacco use history. Referral will not be made for smoking cessation.      Palliative Care  When appropriate, the patient and I discussed the availability palliative care services and when appropriate Hospice care. Palliative care is not the same as Hospice care which was explained to the patient.NOT APPLICABLE.    Survivorship   When appropriate, we discussed that we will refer the patient to survivorship clinic to discuss next steps following completion of planned treatment.  Reviewed this visit will include assessment of your physical, psychological, functional, and spiritual needs as a survivor and the need at attend this visit when scheduled.    TREATMENT EDUCATION    Today I met with the patient to discuss the chemo/biotherapy regimen recommended for treatment of Lymphoma malignant, large cell  - predniSONE (DELTASONE) 50 MG tablet  - ondansetron (ZOFRAN) 8 MG tablet  .  The patient was given explanation of treatment premed side effects including office policy that prohibits patients to drive if sedating medications are administered, MD explanation given regarding benefits, side effects, toxicities and goals of treatment.  The patient received a Chemotherapy/Biotherapy Plan Summary including diagnosis and explanation of specific treatment plan.    SIDE EFFECTS:  Common side effects were discussed with the patient and/or significant other.  Discussion included where applicable hair loss/discoloration, anemia/fatigue, infection/chills/fever, appetite, bleeding risk/precautions, constipation, diarrhea, mouth sores, taste alteration, loss of appetite, nausea/vomiting, peripheral neuropathy, skin/nail changes, rash, muscle aches/weakness, photosensitivity, weight gain/loss, hearing loss,  dizziness, menopausal symptoms, menstrual irregularity, sterility, high blood pressure, heart damage, liver damage, lung damage, kidney damage, DVT/PE risk, fluid retention, pleural/pericardial effusion, somnolence, electrolyte/LFT imbalance, vein exercises and/or the possible need for vascular access/port placement.  The patient was advised that although uncommon, leakage of an infused medication from the vein or venous access device may lead to skin breakdown and/or other tissue damage.  The patient was advised that he/she may have pain, bleeding, and/or bruising from the insertion of a needle in their vein or venous access device (port).  The patient was further advised that, in spite of proper technique, infection with redness and irritation may rarely occur at the site where the needle was inserted.  The patient was advised that if complications occur, additional medical treatment is available.  Finally, where applicable we have reviewed rare but potential immune mediated side effects including shortness of breath, cough, chest pain (pneumonitis), abdominal pain, diarrhea (colitis), thyroiditis (hypothyroid or hyperthyroid), hepatitis and liver dysfunction, nephritis and renal dysfunction.    Discussion also included side effects specific to drugs in the treatment plan, specifically:    Treatment Plans       Name Type Plan Dates Plan Provider         Active    OP LYMPHOMA R-CEOP RiTUXimab / Cyclophosphamide / Etoposide / VinCRIStine / PredniSONE  ONCOLOGY TREATMENT 1/20/2025 - Present Can Sethi MD     OP CNS Intra-CSF Methotrexate ONCOLOGY TREATMENT 2 1/19/2025 - Present Can Sethi MD                      Questions answered and additional information discussed on topics including:  Anemia, Thrombocytopenia, Neutropenia, Nutrition and appetite changes, Constipation, Diarrhea, Nausea & vomiting, Mouth sores, Alopecia, Nervous system changes, Pain, Skin & nail changes, Organ toxicities,  Survivorship, Home care, and Vaccinations       Assessment and Plan:    Diagnoses and all orders for this visit:    1. Lymphoma malignant, large cell (Primary)  -     predniSONE (DELTASONE) 50 MG tablet; Take 2 tablets by mouth Daily. Take with food.  Bring the first dose to chemotherapy appointment.  Dispense: 10 tablet; Refill: 5  -     ondansetron (ZOFRAN) 8 MG tablet; Take 1 tablet by mouth 3 (Three) Times a Day As Needed for Nausea or Vomiting.  Dispense: 30 tablet; Refill: 5    Other orders  -     lidocaine-prilocaine (EMLA) 2.5-2.5 % cream; Apply 1 Application topically to the appropriate area as directed As Needed (apply 1 hour prior to port access).  Dispense: 30 g; Refill: 2      No orders of the defined types were placed in this encounter.        The patient and I have reviewed their diagnosis and scheduled treatment plan. Needs assessment was completed where applicable including genetics, psychosocial needs, barriers to care, VAD evaluation, advanced care planning, survivorship, and palliative care services where indicated. Referrals have been ordered as appropriate based upon evaluation today and patient desires.   Chemo/biotherapy teaching was completed today and consent obtained. See separate documentation for further details.  Adequate time was given to answer questions.  Patient made aware of their care team members and contact information if they have questions or problems throughout the treatment course.  Discussion held and written information provided describing frequency of office visits and ongoing monitoring throughout the treatment plan.     Reviewed with patient any prescribed medication sent to pharmacy.  Education provided regarding proper storage, safe handling, and proper disposal of unused medication.  Proper handling of body fluids and waste discussed and written information provided.  If appropriate, patient had pretreatment labs drawn today.    Learning assessment completed at  initial patient encounter. See separate flowsheet. Chemo/biotherapy education comprehension assessed at today's visit.    I spent 70 minutes caring for [place] on this date of service. This time includes time spent by me in the following activities: preparing for the visit, obtaining and/or reviewing a separately obtained history, performing a medically appropriate examination and/or evaluation, counseling and educating the patient/family/caregiver, ordering medications, tests, or procedures, and documenting information in the medical record.     Ann Marie Horner, APRN   01/20/25

## 2025-01-20 ENCOUNTER — CLINICAL SUPPORT (OUTPATIENT)
Dept: ONCOLOGY | Facility: CLINIC | Age: 70
End: 2025-01-20
Payer: MEDICARE

## 2025-01-20 ENCOUNTER — HOSPITAL ENCOUNTER (OUTPATIENT)
Dept: ONCOLOGY | Facility: HOSPITAL | Age: 70
Discharge: HOME | End: 2025-01-20
Admitting: STUDENT IN AN ORGANIZED HEALTH CARE EDUCATION/TRAINING PROGRAM
Payer: MEDICARE

## 2025-01-20 ENCOUNTER — TELEPHONE (OUTPATIENT)
Dept: ONCOLOGY | Facility: HOSPITAL | Age: 70
End: 2025-01-20
Payer: MEDICARE

## 2025-01-20 ENCOUNTER — NUTRITION (OUTPATIENT)
Dept: ONCOLOGY | Facility: CLINIC | Age: 70
End: 2025-01-20
Payer: MEDICARE

## 2025-01-20 ENCOUNTER — OFFICE VISIT (OUTPATIENT)
Dept: ONCOLOGY | Facility: CLINIC | Age: 70
End: 2025-01-20
Payer: MEDICARE

## 2025-01-20 VITALS
HEIGHT: 62 IN | WEIGHT: 172 LBS | HEART RATE: 67 BPM | OXYGEN SATURATION: 98 % | BODY MASS INDEX: 31.65 KG/M2 | SYSTOLIC BLOOD PRESSURE: 119 MMHG | TEMPERATURE: 98 F | DIASTOLIC BLOOD PRESSURE: 81 MMHG

## 2025-01-20 DIAGNOSIS — C85.80 LYMPHOMA MALIGNANT, LARGE CELL: Primary | ICD-10-CM

## 2025-01-20 DIAGNOSIS — C83.31 DIFFUSE LARGE B-CELL LYMPHOMA, LYMPH NODES OF HEAD, FACE, AND NECK: ICD-10-CM

## 2025-01-20 DIAGNOSIS — C85.80 LYMPHOMA MALIGNANT, LARGE CELL: ICD-10-CM

## 2025-01-20 DIAGNOSIS — Z71.9 ENCOUNTER FOR EDUCATION: ICD-10-CM

## 2025-01-20 LAB
ALBUMIN SERPL-MCNC: 4.3 G/DL (ref 3.5–5.2)
ALBUMIN/GLOB SERPL: 1.7 G/DL
ALP SERPL-CCNC: 72 U/L (ref 39–117)
ALT SERPL W P-5'-P-CCNC: 17 U/L (ref 1–33)
ANION GAP SERPL CALCULATED.3IONS-SCNC: 11.2 MMOL/L (ref 5–15)
AST SERPL-CCNC: 21 U/L (ref 1–32)
BASOPHILS # BLD AUTO: 0.02 10*3/MM3 (ref 0–0.2)
BASOPHILS NFR BLD AUTO: 0.4 % (ref 0–1.5)
BILIRUB SERPL-MCNC: 1.1 MG/DL (ref 0–1.2)
BUN SERPL-MCNC: 8 MG/DL (ref 8–23)
BUN/CREAT SERPL: 8.3 (ref 7–25)
CALCIUM SPEC-SCNC: 10.2 MG/DL (ref 8.6–10.5)
CHLORIDE SERPL-SCNC: 102 MMOL/L (ref 98–107)
CO2 SERPL-SCNC: 24.8 MMOL/L (ref 22–29)
CREAT SERPL-MCNC: 0.96 MG/DL (ref 0.57–1)
DEPRECATED RDW RBC AUTO: 53.1 FL (ref 37–54)
EGFRCR SERPLBLD CKD-EPI 2021: 64.2 ML/MIN/1.73
EOSINOPHIL # BLD AUTO: 0.24 10*3/MM3 (ref 0–0.4)
EOSINOPHIL NFR BLD AUTO: 4.8 % (ref 0.3–6.2)
ERYTHROCYTE [DISTWIDTH] IN BLOOD BY AUTOMATED COUNT: 16.7 % (ref 12.3–15.4)
GLOBULIN UR ELPH-MCNC: 2.6 GM/DL
GLUCOSE SERPL-MCNC: 104 MG/DL (ref 65–99)
HBV SURFACE AB SER RIA-ACNC: NORMAL
HBV SURFACE AG SERPL QL IA: NORMAL
HCT VFR BLD AUTO: 43.5 % (ref 34–46.6)
HGB BLD-MCNC: 13.7 G/DL (ref 12–15.9)
LDH SERPL-CCNC: 350 U/L (ref 135–214)
LYMPHOCYTES # BLD AUTO: 1.45 10*3/MM3 (ref 0.7–3.1)
LYMPHOCYTES NFR BLD AUTO: 29.1 % (ref 19.6–45.3)
MAGNESIUM SERPL-MCNC: 2.1 MG/DL (ref 1.6–2.4)
MCH RBC QN AUTO: 28 PG (ref 26.6–33)
MCHC RBC AUTO-ENTMCNC: 31.5 G/DL (ref 31.5–35.7)
MCV RBC AUTO: 88.8 FL (ref 79–97)
MONOCYTES # BLD AUTO: 0.68 10*3/MM3 (ref 0.1–0.9)
MONOCYTES NFR BLD AUTO: 13.6 % (ref 5–12)
NEUTROPHILS NFR BLD AUTO: 2.6 10*3/MM3 (ref 1.7–7)
NEUTROPHILS NFR BLD AUTO: 52.1 % (ref 42.7–76)
PHOSPHATE SERPL-MCNC: 3.6 MG/DL (ref 2.5–4.5)
PLATELET # BLD AUTO: 90 10*3/MM3 (ref 140–450)
PMV BLD AUTO: 11.7 FL (ref 6–12)
POTASSIUM SERPL-SCNC: 4.1 MMOL/L (ref 3.5–5.2)
PROT SERPL-MCNC: 6.9 G/DL (ref 6–8.5)
RBC # BLD AUTO: 4.9 10*6/MM3 (ref 3.77–5.28)
SODIUM SERPL-SCNC: 138 MMOL/L (ref 136–145)
URATE SERPL-MCNC: 1.8 MG/DL (ref 2.4–5.7)
WBC NRBC COR # BLD AUTO: 4.99 10*3/MM3 (ref 3.4–10.8)

## 2025-01-20 PROCEDURE — 87340 HEPATITIS B SURFACE AG IA: CPT | Performed by: STUDENT IN AN ORGANIZED HEALTH CARE EDUCATION/TRAINING PROGRAM

## 2025-01-20 PROCEDURE — 99215 OFFICE O/P EST HI 40 MIN: CPT | Performed by: NURSE PRACTITIONER

## 2025-01-20 PROCEDURE — 83615 LACTATE (LD) (LDH) ENZYME: CPT | Performed by: STUDENT IN AN ORGANIZED HEALTH CARE EDUCATION/TRAINING PROGRAM

## 2025-01-20 PROCEDURE — 84550 ASSAY OF BLOOD/URIC ACID: CPT | Performed by: STUDENT IN AN ORGANIZED HEALTH CARE EDUCATION/TRAINING PROGRAM

## 2025-01-20 PROCEDURE — 1126F AMNT PAIN NOTED NONE PRSNT: CPT | Performed by: NURSE PRACTITIONER

## 2025-01-20 PROCEDURE — 86706 HEP B SURFACE ANTIBODY: CPT | Performed by: STUDENT IN AN ORGANIZED HEALTH CARE EDUCATION/TRAINING PROGRAM

## 2025-01-20 PROCEDURE — 36415 COLL VENOUS BLD VENIPUNCTURE: CPT

## 2025-01-20 PROCEDURE — 1160F RVW MEDS BY RX/DR IN RCRD: CPT | Performed by: NURSE PRACTITIONER

## 2025-01-20 PROCEDURE — 85025 COMPLETE CBC W/AUTO DIFF WBC: CPT | Performed by: STUDENT IN AN ORGANIZED HEALTH CARE EDUCATION/TRAINING PROGRAM

## 2025-01-20 PROCEDURE — G2212 PROLONG OUTPT/OFFICE VIS: HCPCS | Performed by: NURSE PRACTITIONER

## 2025-01-20 PROCEDURE — 1159F MED LIST DOCD IN RCRD: CPT | Performed by: NURSE PRACTITIONER

## 2025-01-20 PROCEDURE — 84100 ASSAY OF PHOSPHORUS: CPT | Performed by: STUDENT IN AN ORGANIZED HEALTH CARE EDUCATION/TRAINING PROGRAM

## 2025-01-20 PROCEDURE — 80053 COMPREHEN METABOLIC PANEL: CPT | Performed by: STUDENT IN AN ORGANIZED HEALTH CARE EDUCATION/TRAINING PROGRAM

## 2025-01-20 PROCEDURE — 83735 ASSAY OF MAGNESIUM: CPT | Performed by: STUDENT IN AN ORGANIZED HEALTH CARE EDUCATION/TRAINING PROGRAM

## 2025-01-20 RX ORDER — LIDOCAINE/PRILOCAINE 2.5 %-2.5%
1 CREAM (GRAM) TOPICAL AS NEEDED
Qty: 30 G | Refills: 2 | Status: SHIPPED | OUTPATIENT
Start: 2025-01-20

## 2025-01-20 RX ORDER — ONDANSETRON 8 MG/1
8 TABLET, FILM COATED ORAL 3 TIMES DAILY PRN
Qty: 30 TABLET | Refills: 5 | Status: SHIPPED | OUTPATIENT
Start: 2025-01-20

## 2025-01-20 RX ORDER — PREDNISONE 50 MG/1
100 TABLET ORAL DAILY
Qty: 10 TABLET | Refills: 5 | Status: SHIPPED | OUTPATIENT
Start: 2025-01-20

## 2025-01-20 NOTE — PROGRESS NOTES
Patient to port chair for labs for tomorrow's infusion after chemo teach with Ann Marie TAVERAS this morning. . She did not get the emla cream for her port yet and asking since port area tender (placed 1/16/25) if we can draw her labs today peripherally which was done today.   After labs drawm she went to consult room 2 to met with the dietician.

## 2025-01-20 NOTE — PROGRESS NOTES
"                 Nutrition Assessment  Catarina Mclean    Height: 5'2\"  Weight: 172#  BMI: 31.5  Weight Hx:   Wt Readings from Last 20 Encounters:   01/20/25 78 kg (172 lb)   01/16/25 80.3 kg (177 lb)   01/14/25 80.3 kg (177 lb)   01/10/25 80.6 kg (177 lb 12.8 oz)   12/26/24 80.2 kg (176 lb 12.9 oz)   12/23/24 77.8 kg (171 lb 9.6 oz)   12/18/24 78.5 kg (173 lb)   12/16/24 80.3 kg (177 lb)   12/10/24 80.3 kg (177 lb)   11/21/24 77.1 kg (170 lb)   11/20/24 79.9 kg (176 lb 3.2 oz)     IBW: 110# (156.3%)  UBW: 180# (95.5%)    Nutrition Questionnaire    Food Allergies: Pt denied allergies at this time    Chewing Problems: Pt denied chewing problems at this time.     Swallowing Problems: Pt denied problems swallowing at this time.    Who does the cooking & shopping: Pt does, pt lives alone. Pt's son is very helpful and supportive.    Nausea/Vomiting/Diarrhea: Pt denies n/v/c/d    Appetite: (poor) 1 2 3 4 5 6 7 8 9 10 (excellent): 9    24-Hour Diet Recall:  Breakfast - gillette strip x2, scrambled eggs x2, plain toast, oj, coffee  Lunch - skipped  Dinner - chicken salad w/ranch, water  Snacks - gold fish  Water ~64 oz/day    Discussion: Met the pt to provide new-pt diet education. We reviewed possible side effects of her treatment and how it may impact her ability to eat and tolerate food. We discussed the importance of weight maintenance, consuming adequate calories and protein, even when appetite is low, taste changes occur, and energy is low, pt verbalized understanding. We reviewed ways to manage side effects with diet, pt verbalized understanding.    All questions and concerns were addressed and answered. I provided my contact information and encouraged her to call or schedule an appointment as needed.    Nutrition-Related Side Effects:    []Abdominal Pain  [x]Constipation  [x]Diarrhea  []Electrolyte Imbalance  []Fatigue  []HTN  []Hypertriglyceridemia  []Hyponatremia  []Loss of Appetite  []Low Blood Pressure  []Metallic " Taste  [x]Mouth Sores  [x]Nausea  [x]Neutropenia  []Peripheral Neuropathy  []Proteinuria  []Shortness of Breath  []Taste Changes  [x]Vomiting  [x]Weakness  [x]Weight Gain  []Other    Ciro Walter, MS,RD,LD-[place],CD-[place]  Registered Dietitian

## 2025-01-20 NOTE — PROGRESS NOTES
OSW met with pt as part of the treatment preparation process.  Pt was alone.  Introductions made and contact information provided.  Reviewed supports and services available at [facility].  Discussed advance directives.  Pt has none in place currently and does not express a desire to complete any at this time.  Pt was  ([place]) for 25yrs and has been  X 4 years.  She is retired from housekeeping at [employer].  Pt's son, Aston, lives nearby and is supportive.  Pt also states she has a large support system including family and friends.  Pt had port placed and states it is still sore.  She is ready to begin treatment tomorrow.  She states she has no current needs.  Pt was encouraged to reach out if needs arise.

## 2025-01-20 NOTE — PROGRESS NOTES
HEMATOLOGY ONCOLOGY OUTPATIENT FOLLOW UP       Patient name: Catarina Mclean  : 1955  MRN: [id number]  Primary Care Physician: Bozena Alamo NP  Referring Physician: Bozena Alamo NP  Reason For Consult:       History of Present Illness:  Patient is a 69-year-old female who has been referred to us for further evaluation and management of diffuse lymphadenopathy.    She reported having symptoms of worsening fatigue, poor appetite intermittent night sweats and palpable axillary adenopathy approximately 2 months ago.  She was seen by her PCP for the symptoms and was empirically treated with oral antibiotics with limited improvement in her symptoms.      Bilateral breast screening mammogram in 2024 was reported unremarkable.  [BI-RADS 1]    Subsequently a CT chest abdomen pelvis were ordered and is reported as follows:  2024: CT chest/abdomen/pelvis:  Impression:  1.Supra diaphragmatic and infra diaphragmatic lymphadenopathy concerning for lymphoma.     Her past medical history significant for non-Hodgkin lymphoma [likely DLBCL], diagnosed in -10  She reported that she was being followed by Dr. Barkley and had systemic therapy for lymphoma in [id number].  She did not follow-up with oncologist thereafter and was lost to follow-up.  Patient is unable to provide any additional details about her diagnosis or treatment.    Limited prior medical records from  were reviewed:    Patient underwent bilateral tonsillectomy in 2009,  Pathology: Left tonsil was reported to have involvement from malignant lymphoma, large cell type of B-cell origin.  [Of activated B-cell phenotype].  IHC staining was reported positive for CD45, CD20, Bcl-2 and MUM1 with Ki-67 98%.  The tumor was negative for BEV.    A bone marrow biopsy was reported negative for lymphoma involvement at that time.    Patient stated that she is up-to-date whether age-appropriate cancer screening  including annual screening mammograms, Pap smears and colonoscopies.  Last colonoscopy was approximately 10 years ago and was reported normal per patient.    Family history significant for unknown cancer in brother who has been recently diagnosed..      Patient presents for initial consultation today.  She reported having symptoms of generalized fatigue, poor appetite and palpable axillary adenopathy in right axilla and right supraclavicular area for last few weeks.  Denied any other respiratory or GI/ symptoms.  She reported having occasional night sweats but denied any weight loss or fever/chills.  No recent infections reported.    11/20/24: Lymph node, right axillary:  Large B-cell lymphoma  Immunohistochemistry  There is predominance of intermediate to large sized CD45 and CD20+ B-lymphocytes. CD3 and CD5 reveal numerous small T-lymphocytes. B-cells are negative for CD5, CD10, CD15, CD30, and BEV. They are positive for PAX-5, BCL-2, BCL-6, and MUM-1, as well as c-MYC (~40% of neoplastic cells). Ki-67 shows high proliferative rate (50-60%).    Flow Cytometry: Abnormal/ monotypic B-cell population (4% of sample)  Comment: Scatter properties compatible with increased cell size, cells characterized as:  CD45+, CD19+, CD20+, CD5-, CD10-, CD23-, FMC7-, CD30-, CD38-, CD43-/+, HLA  DR+, sIg lambda+    FISH PANEL: Abnormal Lymphoma FISH panel  Aneuploidy: gain of BCL6/3q, MYC/8q, and BCL2/18q  Additional IGH/14q    12/3/24: PET/CT:  Impression:  1. Hypermetabolic left cervical chain, bilateral supraclavicular, right axillary, right subpectoral, portacaval and retroperitoneal adenopathy consistent with known lymphoma.  2. Two small hypermetabolic splenic lesions likely also related to lymphoma. No splenomegaly.  3. Hypermetabolic osseous uptake associated with C3 vertebral body and right posterior 12th rib likely osseous involvement of lymphoma.  4. Additional incidental CT findings above.    12/10/24: Patient seen  today for follow-up visit to discuss her biopsy and imaging results.  She reported again having ongoing fatigue and decreased appetite.  Denied any new complaints today.    12/16/2024: Transthoracic echocardiogram:    Left ventricular ejection fraction appears to be 61 - 65%.    Left ventricular diastolic function is consistent with (grade Ia w/high LAP) impaired relaxation.    The left atrial cavity is dilated.    Estimated right ventricular systolic pressure from tricuspid regurgitation is normal (<35 mmHg).    No significant valvular abnormalities noted.    Extracardiac findings: Periportal lymph nodes are noted.      12/23/2024: Patient seen today for follow-up.  Has petechial rash on her chest and extremities which is new.  Has some ongoing fatigue but otherwise stable    12/25/24- 12/2724:   The patient was admitted to [facility] hospital with complaint of rash which began on face and spread to the chest. She was also noted to have thrombocytopenia with Plt count jacqueline at 58K. She was started on steroids and Atarax with improvement of rash and plt counts. The patient was discharged home on Medrol dose pack as well as Benadryl as needed in view of the rash.     1/10/25: Patient seen today for follow-up visit.  She reported clinically doing better, reported improved appetite and good energy levels today.  She was on tapering steroids since her hospitalization which she has completed approximately 4-5 days ago.  No new complaints reported.  Stated that her palpable supraclavicular and right axillary adenopathy has improved.  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      Subjective:  1/21/25: Patient seen today for follow-up visit.  Clinically doing well this morning, reportedly she had a severe allergic reaction to Bactrim recently for which she was seen at [facility] ER on  1/14/2025 after she accidentally took Bactrim although this was discontinued previously.  She has recovered from her acute symptoms and is near her baseline today.  Reported good energy levels and appetite    Past Medical History:   Diagnosis Date    Drug therapy     Hyperlipidemia     Hypertension     Non Hodgkin's lymphoma 2009       Past Surgical History:   Procedure Laterality Date    BREAST BIOPSY      HYSTERECTOMY           Current Outpatient Medications:     acyclovir (ZOVIRAX) 400 MG tablet, Take 1 tablet by mouth 2 (Two) Times a Day. Take no more than 5 doses a day., Disp: 60 tablet, Rfl: 5    allopurinol (ZYLOPRIM) 300 MG tablet, TAKE 1 TABLET BY MOUTH DAILY, Disp: 30 tablet, Rfl: 0    amLODIPine (NORVASC) 5 MG tablet, Take 1 tablet by mouth Daily., Disp: , Rfl:     aspirin 81 MG chewable tablet, Chew Daily., Disp: , Rfl:     carvedilol (COREG) 6.25 MG tablet, Take 1 tablet by mouth 2 (Two) Times a Day., Disp: 180 tablet, Rfl: 3    fluconazole (DIFLUCAN) 150 MG tablet, , Disp: , Rfl:     lisinopril (PRINIVIL,ZESTRIL) 20 MG tablet, Take 1 tablet by mouth Daily., Disp: , Rfl:     methylPREDNISolone (MEDROL) 4 MG dose pack, Take as directed on package instructions. (Patient not taking: Reported on 1/10/2025), Disp: 21 tablet, Rfl: 0    Omega-3 Fatty Acids (fish oil) 1000 MG capsule capsule, Take 2 capsules by mouth Daily With Breakfast., Disp: , Rfl:     pravastatin (PRAVACHOL) 20 MG tablet, Take 1 tablet by mouth Daily., Disp: , Rfl:     sulfamethoxazole-trimethoprim (BACTRIM DS,SEPTRA DS) 800-160 MG per tablet, 0 Refill(s), Disp: , Rfl:     Allergies   Allergen Reactions    Sulfamethoxazole-Trimethoprim Swelling       Family History   Problem Relation Age of Onset    Liver cancer Brother     Breast cancer Maternal Aunt        Cancer-related family history includes Breast cancer in her maternal aunt; Liver cancer in her brother.      Social History     Tobacco Use    Smoking status: Never     Passive  "exposure: Never    Smokeless tobacco: Never   Vaping Use    Vaping status: Never Used   Substance Use Topics    Alcohol use: Yes     Alcohol/week: 10.0 standard drinks of alcohol     Types: 10 Cans of beer per week     Comment: WEEKLY    Drug use: Never     Social History     Social History Narrative    Not on file       ROS:   Review of Systems   Constitutional:  Positive for activity change, appetite change and fatigue.   HENT: Negative.     Eyes: Negative.    Respiratory: Negative.     Cardiovascular: Negative.    Gastrointestinal: Negative.    Endocrine: Negative.    Genitourinary: Negative.    Musculoskeletal: Negative.    Skin: Negative.    Allergic/Immunologic: Negative.    Neurological: Negative.    Hematological: Negative.    Psychiatric/Behavioral: Negative.           Objective:    Vital Signs:  Vitals:    01/21/25 0754   BP: 132/81   Pulse: 70   Resp: 14   Temp: 98.7 °F (37.1 °C)   TempSrc: Temporal   SpO2: 95%   Weight: 78 kg (172 lb)   Height: 157.5 cm (62.01\")   PainSc: 0-No pain         Body mass index is 31.45 kg/m².    ECOG  (1) Restricted in physically strenuous activity, ambulatory and able to do work of light nature    Physical Exam:   Physical Exam  Constitutional:       Appearance: Normal appearance. She is normal weight.   HENT:      Head: Normocephalic and atraumatic.      Right Ear: External ear normal.      Left Ear: External ear normal.      Nose: Nose normal.      Mouth/Throat:      Mouth: Mucous membranes are moist.      Pharynx: Oropharynx is clear.   Eyes:      Extraocular Movements: Extraocular movements intact.      Conjunctiva/sclera: Conjunctivae normal.      Pupils: Pupils are equal, round, and reactive to light.   Cardiovascular:      Rate and Rhythm: Normal rate.      Pulses: Normal pulses.   Pulmonary:      Effort: Pulmonary effort is normal.   Abdominal:      General: Abdomen is flat.      Palpations: Abdomen is soft.   Musculoskeletal:         General: Normal range of motion. " "     Cervical back: Normal range of motion and neck supple.   Lymphadenopathy:      Upper Body:      Right upper body: Supraclavicular adenopathy and axillary adenopathy present.   Skin:     General: Skin is warm.   Neurological:      General: No focal deficit present.      Mental Status: She is alert and oriented to person, place, and time.   Psychiatric:         Mood and Affect: Mood normal.         Behavior: Behavior normal.         Thought Content: Thought content normal.         Judgment: Judgment normal.         Lab Results - Last 18 Months   Lab Units 01/20/25  0935 01/16/25  1106 01/07/25  1246   WBC 10*3/mm3 4.99 11.43* 9.71   HEMOGLOBIN g/dL 13.7 12.7 13.4   HEMATOCRIT % 43.5 40.1 42.1   PLATELETS 10*3/mm3 90* 57* 92*   MCV fL 88.8 87.6 87.2     Lab Results - Last 18 Months   Lab Units 01/07/25  1246 12/27/24  0315 12/26/24  0408 12/25/24  1130 12/23/24  1511   SODIUM mmol/L 139 140 138 137 136   POTASSIUM mmol/L 4.2 4.3 4.2 4.0 4.1   CHLORIDE mmol/L 104 106 104 103 100   CO2 mmol/L 27.3 23.1 21.2* 22.8 26.0   BUN mg/dL 10 15 14 13 10   CREATININE mg/dL 0.87 0.84 0.89 1.14* 1.26*   CALCIUM mg/dL 9.6 9.9 9.7 9.7 10.1   BILIRUBIN mg/dL 1.1  --   --  0.9 1.0   ALK PHOS U/L 67  --   --  99 89   ALT (SGPT) U/L 23  --   --  22 21   AST (SGOT) U/L 25  --   --  31 29   GLUCOSE mg/dL 91 154* 156* 147* 98       Lab Results   Component Value Date    GLUCOSE 91 01/07/2025    BUN 10 01/07/2025    CREATININE 0.87 01/07/2025    BCR 11.5 01/07/2025    K 4.2 01/07/2025    CO2 27.3 01/07/2025    CALCIUM 9.6 01/07/2025    ALBUMIN 3.7 01/07/2025    AST 25 01/07/2025    ALT 23 01/07/2025       Lab Results - Last 18 Months   Lab Units 01/16/25  1106 12/25/24  1130   INR  1.01 1.05   APTT seconds 30.2 35.8*       No results found for: \"IRON\", \"TIBC\", \"FERRITIN\"    No results found for: \"FOLATE\"    No results found for: \"OCCULTBLD\"    No results found for: \"RETICCTPCT\"  No results found for: \"FKLPTONV67\"  No results found for: " "\"SPEP\", \"UPEP\"  LDH   Date Value Ref Range Status   01/07/2025 321 (H) 135 - 214 U/L Final     Comment:     Specimen hemolyzed.  Results may be affected.     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  Patient has remote history of aggressive NHL, status post chemotherapy in 2009-10.  -CT chest/abdomen/pelvis reviewed, noted to have extensive lymphadenopathy involving supraclavicular, axillary and intra-abdominal lymph nodes.  No solid organ masses noted concerning for solid organ metastasis.  -Axillary lymph node biopsy and PET/CT findings reviewed as above.  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  Ki-67 is elevated at 50-60%  -I reviewed these findings with patient in detail.  Given extensive disease and aggressive disease biology, she was initially being considered for dose adjusted R-EPOCH regimen, I also discussed her case with [facility] BMT attending Dr. Zuleta. Due to concern about prior exposure to anthracyclines during at time of her initial diagnosis and treatment in 2009-10, repeat treatment with anthracycline based regimen may carry risk of significant cardiotoxicity.  She has been since evaluated at [facility] BMT clinic by Dr. Hirsch who " recommended to start patient on R-CEOP regimen for total 6 cycles replacing Doxorubicin with Etoposide.  -This plan was discussed in detail with patient today.  Potential treatment related adverse effects were explained to her.  She verbalized understanding and is agreeable to treatment.   -patient is planned for C1D1 R-CEOP today. Labs are adequate, okay to proceed with treatment.    CNS prophylaxis:    The patient will need CNS prophylaxis  with IT methotrexate with systemic therapy given high risk for CNS involvement [CNS IPI 4, associated with high risk of CNS involvement].  -patient is scheduled for IT Chemotherapy with methotrexate on 1/24/25.      Cardiac health: Detailed medical records pertaining to her prior cancer treatment are not available, however it seems she had outpatient chemotherapy with R-CHOP or similar regimen containing anthracyclines..  Will try to obtain these records but there is significant concern about patient crossing the maximum recommended lifetime dose for anthracyclines with her planned lymphoma treatment.  -Discussed her case with cardiology [Dr. García].  She was referred to cardiology clinic to discuss cardiac risk reduction and to start prophylactic treatment.   -Initial echocardiogram reported normal with EF 61-65%  She has been started on Coreg and Jardiance.       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  Will continue throughout treatment    ID: Started patient on antiviral and PCP prophylaxis with acyclovir and Bactrim. Bactrim has been held due to concern about drug induced rash and thrombocytopenia.  Will start patient on Daily Atovaquone.    Skin Rash:   Thrombocytopenia:  This has improved. Patient is s/p completion of tapering steroids.   Persistent thrombocytopenia could be secondary to accidental use of bactrim recently.      Bactrim allergy : Patient has had severe symptoms following accidentally taking Bactrim recently  requiring her to be seen in ER, she was treated with IV Solu-Medrol, Benadryl and Pepcid.  Continue to hold off on Bactrim moving forward.        Follow up with APRN in 3 weeks with treatment, sooner as needed.  Continue weekly labs as planned.      Thank you very much for providing the opportunity to participate in this patient’s care. Please do not hesitate to call if there are any other questions.

## 2025-01-21 ENCOUNTER — HOSPITAL ENCOUNTER (OUTPATIENT)
Dept: ONCOLOGY | Facility: HOSPITAL | Age: 70
Discharge: HOME | End: 2025-01-21
Admitting: STUDENT IN AN ORGANIZED HEALTH CARE EDUCATION/TRAINING PROGRAM
Payer: MEDICARE

## 2025-01-21 ENCOUNTER — OFFICE VISIT (OUTPATIENT)
Dept: ONCOLOGY | Facility: CLINIC | Age: 70
End: 2025-01-21
Payer: MEDICARE

## 2025-01-21 VITALS
HEART RATE: 70 BPM | WEIGHT: 172 LBS | TEMPERATURE: 98.7 F | BODY MASS INDEX: 31.65 KG/M2 | RESPIRATION RATE: 14 BRPM | HEIGHT: 62 IN | OXYGEN SATURATION: 95 % | DIASTOLIC BLOOD PRESSURE: 81 MMHG | SYSTOLIC BLOOD PRESSURE: 132 MMHG

## 2025-01-21 VITALS
WEIGHT: 172.1 LBS | SYSTOLIC BLOOD PRESSURE: 135 MMHG | RESPIRATION RATE: 14 BRPM | BODY MASS INDEX: 31.67 KG/M2 | HEART RATE: 66 BPM | TEMPERATURE: 98.7 F | DIASTOLIC BLOOD PRESSURE: 74 MMHG | HEIGHT: 62 IN | OXYGEN SATURATION: 95 %

## 2025-01-21 DIAGNOSIS — C85.80 LYMPHOMA MALIGNANT, LARGE CELL: Primary | ICD-10-CM

## 2025-01-21 DIAGNOSIS — Z45.2 ENCOUNTER FOR CARE RELATED TO VASCULAR ACCESS PORT: ICD-10-CM

## 2025-01-21 DIAGNOSIS — C83.31 DIFFUSE LARGE B-CELL LYMPHOMA, LYMPH NODES OF HEAD, FACE, AND NECK: ICD-10-CM

## 2025-01-21 DIAGNOSIS — Z51.11 ENCOUNTER FOR ANTINEOPLASTIC CHEMOTHERAPY: ICD-10-CM

## 2025-01-21 DIAGNOSIS — R59.0 AXILLARY LYMPHADENOPATHY: ICD-10-CM

## 2025-01-21 DIAGNOSIS — R59.1 LYMPHADENOPATHY: ICD-10-CM

## 2025-01-21 PROCEDURE — 25810000003 SODIUM CHLORIDE 0.9 % SOLUTION: Performed by: STUDENT IN AN ORGANIZED HEALTH CARE EDUCATION/TRAINING PROGRAM

## 2025-01-21 PROCEDURE — 96411 CHEMO IV PUSH ADDL DRUG: CPT

## 2025-01-21 PROCEDURE — 25010000002 RITUXIMAB 10 MG/ML SOLUTION 50 ML VIAL: Performed by: STUDENT IN AN ORGANIZED HEALTH CARE EDUCATION/TRAINING PROGRAM

## 2025-01-21 PROCEDURE — 25010000002 VINCRISTINE SULFATE 2 MG/2ML SOLUTION 2 ML VIAL: Performed by: STUDENT IN AN ORGANIZED HEALTH CARE EDUCATION/TRAINING PROGRAM

## 2025-01-21 PROCEDURE — 25810000003 SODIUM CHLORIDE 0.9 % SOLUTION 250 ML FLEX CONT: Performed by: STUDENT IN AN ORGANIZED HEALTH CARE EDUCATION/TRAINING PROGRAM

## 2025-01-21 PROCEDURE — 96417 CHEMO IV INFUS EACH ADDL SEQ: CPT

## 2025-01-21 PROCEDURE — 25010000002 RITUXIMAB 10 MG/ML SOLUTION 10 ML VIAL: Performed by: STUDENT IN AN ORGANIZED HEALTH CARE EDUCATION/TRAINING PROGRAM

## 2025-01-21 PROCEDURE — 25010000002 DIPHENHYDRAMINE PER 50 MG: Performed by: STUDENT IN AN ORGANIZED HEALTH CARE EDUCATION/TRAINING PROGRAM

## 2025-01-21 PROCEDURE — 25010000002 CYCLOPHOSPHAMIDE 2 GM/10ML SOLUTION 10 ML VIAL: Performed by: STUDENT IN AN ORGANIZED HEALTH CARE EDUCATION/TRAINING PROGRAM

## 2025-01-21 PROCEDURE — 63710000001 ACETAMINOPHEN 325 MG TABLET: Performed by: STUDENT IN AN ORGANIZED HEALTH CARE EDUCATION/TRAINING PROGRAM

## 2025-01-21 PROCEDURE — A9270 NON-COVERED ITEM OR SERVICE: HCPCS | Performed by: STUDENT IN AN ORGANIZED HEALTH CARE EDUCATION/TRAINING PROGRAM

## 2025-01-21 PROCEDURE — 25010000002 PALONOSETRON PER 25 MCG: Performed by: STUDENT IN AN ORGANIZED HEALTH CARE EDUCATION/TRAINING PROGRAM

## 2025-01-21 PROCEDURE — 25010000002 HEPARIN LOCK FLUSH PER 10 UNITS: Performed by: STUDENT IN AN ORGANIZED HEALTH CARE EDUCATION/TRAINING PROGRAM

## 2025-01-21 PROCEDURE — 96415 CHEMO IV INFUSION ADDL HR: CPT

## 2025-01-21 PROCEDURE — 25010000002 ETOPOSIDE 1 GM/50ML SOLUTION 50 ML VIAL: Performed by: STUDENT IN AN ORGANIZED HEALTH CARE EDUCATION/TRAINING PROGRAM

## 2025-01-21 PROCEDURE — 96413 CHEMO IV INFUSION 1 HR: CPT

## 2025-01-21 PROCEDURE — 96375 TX/PRO/DX INJ NEW DRUG ADDON: CPT

## 2025-01-21 RX ORDER — HEPARIN SODIUM (PORCINE) LOCK FLUSH IV SOLN 100 UNIT/ML 100 UNIT/ML
500 SOLUTION INTRAVENOUS AS NEEDED
Status: DISCONTINUED | OUTPATIENT
Start: 2025-01-21 | End: 2025-01-22 | Stop reason: HOSPADM

## 2025-01-21 RX ORDER — DIPHENHYDRAMINE HYDROCHLORIDE 50 MG/ML
50 INJECTION INTRAMUSCULAR; INTRAVENOUS ONCE
Status: COMPLETED | OUTPATIENT
Start: 2025-01-21 | End: 2025-01-21

## 2025-01-21 RX ORDER — SODIUM CHLORIDE 0.9 % (FLUSH) 0.9 %
20 SYRINGE (ML) INJECTION AS NEEDED
Status: DISCONTINUED | OUTPATIENT
Start: 2025-01-21 | End: 2025-01-22 | Stop reason: HOSPADM

## 2025-01-21 RX ORDER — SODIUM CHLORIDE 0.9 % (FLUSH) 0.9 %
20 SYRINGE (ML) INJECTION AS NEEDED
Status: CANCELLED | OUTPATIENT
Start: 2025-01-21

## 2025-01-21 RX ORDER — PALONOSETRON 0.05 MG/ML
0.25 INJECTION, SOLUTION INTRAVENOUS ONCE
Status: COMPLETED | OUTPATIENT
Start: 2025-01-21 | End: 2025-01-21

## 2025-01-21 RX ORDER — HEPARIN SODIUM (PORCINE) LOCK FLUSH IV SOLN 100 UNIT/ML 100 UNIT/ML
500 SOLUTION INTRAVENOUS AS NEEDED
Status: CANCELLED | OUTPATIENT
Start: 2025-01-21

## 2025-01-21 RX ORDER — ACETAMINOPHEN 325 MG/1
650 TABLET ORAL ONCE
Status: COMPLETED | OUTPATIENT
Start: 2025-01-21 | End: 2025-01-21

## 2025-01-21 RX ORDER — SODIUM CHLORIDE 9 MG/ML
20 INJECTION, SOLUTION INTRAVENOUS ONCE
Status: COMPLETED | OUTPATIENT
Start: 2025-01-21 | End: 2025-01-21

## 2025-01-21 RX ORDER — ATOVAQUONE 750 MG/5ML
750 SUSPENSION ORAL DAILY
Qty: 210 ML | Refills: 2 | Status: SHIPPED | OUTPATIENT
Start: 2025-01-21 | End: 2025-01-24 | Stop reason: SDUPTHER

## 2025-01-21 RX ADMIN — SODIUM CHLORIDE 20 ML/HR: 0.9 INJECTION, SOLUTION INTRAVENOUS at 08:42

## 2025-01-21 RX ADMIN — RITUXIMAB 680 MG: 10 INJECTION, SOLUTION INTRAVENOUS at 09:11

## 2025-01-21 RX ADMIN — DIPHENHYDRAMINE HYDROCHLORIDE 50 MG: 50 INJECTION INTRAMUSCULAR; INTRAVENOUS at 08:46

## 2025-01-21 RX ADMIN — ACETAMINOPHEN 650 MG: 325 TABLET ORAL at 08:42

## 2025-01-21 RX ADMIN — SODIUM CHLORIDE 90 MG: 0.9 INJECTION, SOLUTION INTRAVENOUS at 13:40

## 2025-01-21 RX ADMIN — CYCLOPHOSPHAMIDE 1360 MG: 2 INJECTION INTRAVENOUS at 13:04

## 2025-01-21 RX ADMIN — HEPARIN 500 UNITS: 100 SYRINGE at 15:00

## 2025-01-21 RX ADMIN — VINCRISTINE SULFATE 2 MG: 1 INJECTION, SOLUTION INTRAVENOUS at 14:47

## 2025-01-21 RX ADMIN — PALONOSETRON HYDROCHLORIDE 0.25 MG: 0.25 INJECTION INTRAVENOUS at 08:42

## 2025-01-21 RX ADMIN — Medication 20 ML: at 15:00

## 2025-01-22 ENCOUNTER — HOSPITAL ENCOUNTER (OUTPATIENT)
Dept: ONCOLOGY | Facility: HOSPITAL | Age: 70
Discharge: HOME | End: 2025-01-22
Admitting: STUDENT IN AN ORGANIZED HEALTH CARE EDUCATION/TRAINING PROGRAM
Payer: MEDICARE

## 2025-01-22 VITALS
RESPIRATION RATE: 16 BRPM | HEART RATE: 72 BPM | OXYGEN SATURATION: 96 % | HEIGHT: 62 IN | SYSTOLIC BLOOD PRESSURE: 161 MMHG | WEIGHT: 172 LBS | BODY MASS INDEX: 31.65 KG/M2 | DIASTOLIC BLOOD PRESSURE: 82 MMHG | TEMPERATURE: 97.6 F

## 2025-01-22 DIAGNOSIS — Z45.2 ENCOUNTER FOR CARE RELATED TO VASCULAR ACCESS PORT: ICD-10-CM

## 2025-01-22 DIAGNOSIS — C85.80 LYMPHOMA MALIGNANT, LARGE CELL: Primary | ICD-10-CM

## 2025-01-22 LAB — HBV CORE AB SERPL QL IA: NEGATIVE

## 2025-01-22 PROCEDURE — 25010000002 HEPARIN LOCK FLUSH PER 10 UNITS: Performed by: STUDENT IN AN ORGANIZED HEALTH CARE EDUCATION/TRAINING PROGRAM

## 2025-01-22 PROCEDURE — 25810000003 SODIUM CHLORIDE 0.9 % SOLUTION 250 ML FLEX CONT: Performed by: STUDENT IN AN ORGANIZED HEALTH CARE EDUCATION/TRAINING PROGRAM

## 2025-01-22 PROCEDURE — 25010000002 ETOPOSIDE 1 GM/50ML SOLUTION 50 ML VIAL: Performed by: STUDENT IN AN ORGANIZED HEALTH CARE EDUCATION/TRAINING PROGRAM

## 2025-01-22 PROCEDURE — 96413 CHEMO IV INFUSION 1 HR: CPT

## 2025-01-22 RX ORDER — SODIUM CHLORIDE 0.9 % (FLUSH) 0.9 %
20 SYRINGE (ML) INJECTION AS NEEDED
Status: DISCONTINUED | OUTPATIENT
Start: 2025-01-22 | End: 2025-01-23 | Stop reason: HOSPADM

## 2025-01-22 RX ORDER — SODIUM CHLORIDE 0.9 % (FLUSH) 0.9 %
20 SYRINGE (ML) INJECTION AS NEEDED
Status: CANCELLED | OUTPATIENT
Start: 2025-01-22

## 2025-01-22 RX ORDER — HEPARIN SODIUM (PORCINE) LOCK FLUSH IV SOLN 100 UNIT/ML 100 UNIT/ML
500 SOLUTION INTRAVENOUS AS NEEDED
Status: CANCELLED | OUTPATIENT
Start: 2025-01-22

## 2025-01-22 RX ORDER — HEPARIN SODIUM (PORCINE) LOCK FLUSH IV SOLN 100 UNIT/ML 100 UNIT/ML
500 SOLUTION INTRAVENOUS AS NEEDED
Status: DISCONTINUED | OUTPATIENT
Start: 2025-01-22 | End: 2025-01-23 | Stop reason: HOSPADM

## 2025-01-22 RX ORDER — SODIUM CHLORIDE 9 MG/ML
20 INJECTION, SOLUTION INTRAVENOUS ONCE
Status: DISCONTINUED | OUTPATIENT
Start: 2025-01-22 | End: 2025-01-23 | Stop reason: HOSPADM

## 2025-01-22 RX ADMIN — Medication 20 ML: at 13:30

## 2025-01-22 RX ADMIN — SODIUM CHLORIDE 90 MG: 9 INJECTION, SOLUTION INTRAVENOUS at 12:20

## 2025-01-22 RX ADMIN — Medication 500 UNITS: at 13:30

## 2025-01-23 ENCOUNTER — HOSPITAL ENCOUNTER (OUTPATIENT)
Dept: ONCOLOGY | Facility: HOSPITAL | Age: 70
Discharge: HOME | End: 2025-01-23
Admitting: STUDENT IN AN ORGANIZED HEALTH CARE EDUCATION/TRAINING PROGRAM
Payer: MEDICARE

## 2025-01-23 VITALS
SYSTOLIC BLOOD PRESSURE: 151 MMHG | OXYGEN SATURATION: 94 % | HEART RATE: 72 BPM | BODY MASS INDEX: 32.2 KG/M2 | DIASTOLIC BLOOD PRESSURE: 84 MMHG | TEMPERATURE: 97.5 F | WEIGHT: 175 LBS | HEIGHT: 62 IN | RESPIRATION RATE: 14 BRPM

## 2025-01-23 DIAGNOSIS — Z45.2 ENCOUNTER FOR CARE RELATED TO VASCULAR ACCESS PORT: ICD-10-CM

## 2025-01-23 DIAGNOSIS — C85.80 LYMPHOMA MALIGNANT, LARGE CELL: Primary | ICD-10-CM

## 2025-01-23 PROCEDURE — 25010000002 ETOPOSIDE 1 GM/50ML SOLUTION 50 ML VIAL: Performed by: STUDENT IN AN ORGANIZED HEALTH CARE EDUCATION/TRAINING PROGRAM

## 2025-01-23 PROCEDURE — 25810000003 SODIUM CHLORIDE 0.9 % SOLUTION 250 ML FLEX CONT: Performed by: STUDENT IN AN ORGANIZED HEALTH CARE EDUCATION/TRAINING PROGRAM

## 2025-01-23 PROCEDURE — 96413 CHEMO IV INFUSION 1 HR: CPT

## 2025-01-23 PROCEDURE — 25010000002 HEPARIN LOCK FLUSH PER 10 UNITS: Performed by: STUDENT IN AN ORGANIZED HEALTH CARE EDUCATION/TRAINING PROGRAM

## 2025-01-23 RX ORDER — HEPARIN SODIUM (PORCINE) LOCK FLUSH IV SOLN 100 UNIT/ML 100 UNIT/ML
500 SOLUTION INTRAVENOUS AS NEEDED
Status: DISCONTINUED | OUTPATIENT
Start: 2025-01-23 | End: 2025-01-24 | Stop reason: HOSPADM

## 2025-01-23 RX ORDER — SODIUM CHLORIDE 0.9 % (FLUSH) 0.9 %
20 SYRINGE (ML) INJECTION AS NEEDED
Status: CANCELLED | OUTPATIENT
Start: 2025-01-24

## 2025-01-23 RX ORDER — SODIUM CHLORIDE 0.9 % (FLUSH) 0.9 %
20 SYRINGE (ML) INJECTION AS NEEDED
Status: DISCONTINUED | OUTPATIENT
Start: 2025-01-23 | End: 2025-01-24 | Stop reason: HOSPADM

## 2025-01-23 RX ORDER — HEPARIN SODIUM (PORCINE) LOCK FLUSH IV SOLN 100 UNIT/ML 100 UNIT/ML
500 SOLUTION INTRAVENOUS AS NEEDED
Status: CANCELLED | OUTPATIENT
Start: 2025-01-24

## 2025-01-23 RX ORDER — SODIUM CHLORIDE 9 MG/ML
20 INJECTION, SOLUTION INTRAVENOUS ONCE
Status: DISCONTINUED | OUTPATIENT
Start: 2025-01-23 | End: 2025-01-24 | Stop reason: HOSPADM

## 2025-01-23 RX ADMIN — SODIUM CHLORIDE 90 MG: 0.9 INJECTION, SOLUTION INTRAVENOUS at 11:13

## 2025-01-23 RX ADMIN — Medication 300 UNITS: at 12:24

## 2025-01-23 RX ADMIN — Medication 20 ML: at 12:24

## 2025-01-23 NOTE — PROGRESS NOTES
Pt here for C1D3 Toposar,tolerated well.Pt took PO prednisone as instructed. Pt sched for injection 1/24

## 2025-01-24 ENCOUNTER — TELEPHONE (OUTPATIENT)
Dept: ONCOLOGY | Facility: CLINIC | Age: 70
End: 2025-01-24
Payer: MEDICARE

## 2025-01-24 ENCOUNTER — HOSPITAL ENCOUNTER (OUTPATIENT)
Dept: ONCOLOGY | Facility: HOSPITAL | Age: 70
Discharge: HOME | End: 2025-01-24
Payer: MEDICARE

## 2025-01-24 ENCOUNTER — HOSPITAL ENCOUNTER (OUTPATIENT)
Dept: INTERVENTIONAL RADIOLOGY/VASCULAR | Facility: HOSPITAL | Age: 70
Discharge: HOME | End: 2025-01-24
Payer: MEDICARE

## 2025-01-24 VITALS
BODY MASS INDEX: 32.2 KG/M2 | TEMPERATURE: 97.6 F | HEART RATE: 55 BPM | RESPIRATION RATE: 13 BRPM | WEIGHT: 175 LBS | DIASTOLIC BLOOD PRESSURE: 69 MMHG | SYSTOLIC BLOOD PRESSURE: 142 MMHG | OXYGEN SATURATION: 97 % | HEIGHT: 62 IN

## 2025-01-24 DIAGNOSIS — C85.80 LYMPHOMA MALIGNANT, LARGE CELL: Primary | ICD-10-CM

## 2025-01-24 DIAGNOSIS — C83.31 DIFFUSE LARGE B-CELL LYMPHOMA, LYMPH NODES OF HEAD, FACE, AND NECK: ICD-10-CM

## 2025-01-24 LAB
APTT PPP: 32.3 SECONDS (ref 22.7–35.4)
BASOPHILS # BLD AUTO: 0.01 10*3/MM3 (ref 0–0.2)
BASOPHILS NFR BLD AUTO: 0.2 % (ref 0–1.5)
DEPRECATED RDW RBC AUTO: 47.8 FL (ref 37–54)
EOSINOPHIL # BLD AUTO: 0.02 10*3/MM3 (ref 0–0.4)
EOSINOPHIL NFR BLD AUTO: 0.4 % (ref 0.3–6.2)
ERYTHROCYTE [DISTWIDTH] IN BLOOD BY AUTOMATED COUNT: 15.1 % (ref 12.3–15.4)
HCT VFR BLD AUTO: 36.2 % (ref 34–46.6)
HGB BLD-MCNC: 11.7 G/DL (ref 12–15.9)
IMM GRANULOCYTES # BLD AUTO: 0.02 10*3/MM3 (ref 0–0.05)
IMM GRANULOCYTES NFR BLD AUTO: 0.4 % (ref 0–0.5)
INR PPP: 1.11 (ref 0.9–1.1)
LYMPHOCYTES # BLD AUTO: 1.06 10*3/MM3 (ref 0.7–3.1)
LYMPHOCYTES NFR BLD AUTO: 21.8 % (ref 19.6–45.3)
MCH RBC QN AUTO: 27.7 PG (ref 26.6–33)
MCHC RBC AUTO-ENTMCNC: 32.3 G/DL (ref 31.5–35.7)
MCV RBC AUTO: 85.8 FL (ref 79–97)
MONOCYTES # BLD AUTO: 0.27 10*3/MM3 (ref 0.1–0.9)
MONOCYTES NFR BLD AUTO: 5.6 % (ref 5–12)
NEUTROPHILS NFR BLD AUTO: 3.48 10*3/MM3 (ref 1.7–7)
NEUTROPHILS NFR BLD AUTO: 71.6 % (ref 42.7–76)
NRBC BLD AUTO-RTO: 0 /100 WBC (ref 0–0.2)
PLATELET # BLD AUTO: 134 10*3/MM3 (ref 140–450)
PMV BLD AUTO: 10.3 FL (ref 6–12)
PROTHROMBIN TIME: 14.3 SECONDS (ref 11.7–14.2)
RBC # BLD AUTO: 4.22 10*6/MM3 (ref 3.77–5.28)
WBC NRBC COR # BLD AUTO: 4.86 10*3/MM3 (ref 3.4–10.8)

## 2025-01-24 PROCEDURE — 25010000002 PEGFILGRASTIM-FPGK 6 MG/0.6ML SOLUTION PREFILLED SYRINGE: Performed by: STUDENT IN AN ORGANIZED HEALTH CARE EDUCATION/TRAINING PROGRAM

## 2025-01-24 PROCEDURE — 25010000002 HEPARIN LOCK FLUSH PER 10 UNITS

## 2025-01-24 PROCEDURE — 25010000002 METHOTREXATE SODIUM PER 50 MG: Performed by: STUDENT IN AN ORGANIZED HEALTH CARE EDUCATION/TRAINING PROGRAM

## 2025-01-24 PROCEDURE — 85025 COMPLETE CBC W/AUTO DIFF WBC: CPT | Performed by: RADIOLOGY

## 2025-01-24 PROCEDURE — 96450 CHEMOTHERAPY INTO CNS: CPT

## 2025-01-24 PROCEDURE — 96372 THER/PROPH/DIAG INJ SC/IM: CPT

## 2025-01-24 PROCEDURE — 85730 THROMBOPLASTIN TIME PARTIAL: CPT | Performed by: RADIOLOGY

## 2025-01-24 PROCEDURE — 25010000002 FENTANYL CITRATE (PF) 50 MCG/ML SOLUTION

## 2025-01-24 PROCEDURE — 25010000002 LIDOCAINE 1 % SOLUTION

## 2025-01-24 PROCEDURE — 85610 PROTHROMBIN TIME: CPT | Performed by: RADIOLOGY

## 2025-01-24 PROCEDURE — 77003 FLUOROGUIDE FOR SPINE INJECT: CPT

## 2025-01-24 RX ORDER — LIDOCAINE HYDROCHLORIDE 10 MG/ML
INJECTION, SOLUTION INFILTRATION; PERINEURAL AS NEEDED
Status: COMPLETED | OUTPATIENT
Start: 2025-01-24 | End: 2025-01-24

## 2025-01-24 RX ORDER — METHOTREXATE 25 MG/ML
INJECTION INTRA-ARTERIAL; INTRAMUSCULAR; INTRATHECAL; INTRAVENOUS AS NEEDED
Status: COMPLETED | OUTPATIENT
Start: 2025-01-24 | End: 2025-01-24

## 2025-01-24 RX ORDER — ATOVAQUONE 750 MG/5ML
750 SUSPENSION ORAL DAILY
Qty: 210 ML | Refills: 2 | Status: SHIPPED | OUTPATIENT
Start: 2025-01-24 | End: 2025-01-27

## 2025-01-24 RX ORDER — FENTANYL CITRATE 50 UG/ML
INJECTION, SOLUTION INTRAMUSCULAR; INTRAVENOUS AS NEEDED
Status: COMPLETED | OUTPATIENT
Start: 2025-01-24 | End: 2025-01-24

## 2025-01-24 RX ORDER — HEPARIN SODIUM (PORCINE) LOCK FLUSH IV SOLN 100 UNIT/ML 100 UNIT/ML
500 SOLUTION INTRAVENOUS AS NEEDED
Status: DISCONTINUED | OUTPATIENT
Start: 2025-01-24 | End: 2025-01-25 | Stop reason: HOSPADM

## 2025-01-24 RX ADMIN — PEGFLILGRASTIM-FPGK 6 MG: 6 INJECTION, SOLUTION SUBCUTANEOUS at 10:40

## 2025-01-24 RX ADMIN — METHOTREXATE 12 MG: 25 INJECTION, SOLUTION INTRA-ARTERIAL; INTRAMUSCULAR; INTRATHECAL; INTRAVENOUS at 13:20

## 2025-01-24 RX ADMIN — LIDOCAINE HYDROCHLORIDE 10 ML: 10 INJECTION, SOLUTION INFILTRATION; PERINEURAL at 13:15

## 2025-01-24 RX ADMIN — FENTANYL CITRATE 100 MCG: 50 INJECTION, SOLUTION INTRAMUSCULAR; INTRAVENOUS at 13:04

## 2025-01-24 RX ADMIN — Medication 500 UNITS: at 14:16

## 2025-01-24 NOTE — TELEPHONE ENCOUNTER
Caller: Catarina Mclean    Relationship: Self    Best call back number: [phone]      What was the call regarding: PATIENT CALLED TO CHECK ON THE NEW MEDICATION DR FLORES WANTED HER TO START, PATIENT NOT SURE OF THE NAME    [facility] [id number] - [place], [place] - [address] [facility] [address] - [phone]  - [phone] FX

## 2025-01-24 NOTE — DISCHARGE INSTRUCTIONS
You may resume all your regular medications. Advance your diet as tolerated. Rest at home for the remainder of the day. You may remove your bandaid tomorrow then you can shower. Do not drive for the remainder of the day. Do not lift more than 10 lbs for 48 hours.If you experience severe pain not relieved with rest or medications, increased shortness of air or a racing heartbeat, seek immediate medical attention.  Follow up with Dr. Sethi as instructed.

## 2025-01-24 NOTE — TELEPHONE ENCOUNTER
Spoke to patient, she stated [facility] pharmacy said they did not have script. Prescription resent to pharmacy. Also informed patient of her next intrathecal methotrexate appointment scheduled for 2/14 with a 11:00 am arrival. Patient voices understanding

## 2025-01-24 NOTE — PROGRESS NOTES
Pt here for Stimufend injection,denies any new complaints,injection tolerated well,next appt sched

## 2025-01-25 LAB — Lab: NORMAL

## 2025-01-27 ENCOUNTER — TELEPHONE (OUTPATIENT)
Dept: ONCOLOGY | Facility: CLINIC | Age: 70
End: 2025-01-27
Payer: MEDICARE

## 2025-01-27 ENCOUNTER — HOSPITAL ENCOUNTER (OUTPATIENT)
Dept: ONCOLOGY | Facility: HOSPITAL | Age: 70
Discharge: HOME | End: 2025-01-27
Admitting: STUDENT IN AN ORGANIZED HEALTH CARE EDUCATION/TRAINING PROGRAM
Payer: MEDICARE

## 2025-01-27 DIAGNOSIS — C83.31 DIFFUSE LARGE B-CELL LYMPHOMA, LYMPH NODES OF HEAD, FACE, AND NECK: ICD-10-CM

## 2025-01-27 DIAGNOSIS — C85.80 LYMPHOMA MALIGNANT, LARGE CELL: ICD-10-CM

## 2025-01-27 DIAGNOSIS — C83.31 DIFFUSE LARGE B-CELL LYMPHOMA, LYMPH NODES OF HEAD, FACE, AND NECK: Primary | ICD-10-CM

## 2025-01-27 DIAGNOSIS — Z45.2 ENCOUNTER FOR CARE RELATED TO VASCULAR ACCESS PORT: Primary | ICD-10-CM

## 2025-01-27 LAB
ALBUMIN SERPL-MCNC: 3.7 G/DL (ref 3.5–5.2)
ALBUMIN/GLOB SERPL: 1.9 G/DL
ALP SERPL-CCNC: 102 U/L (ref 39–117)
ALT SERPL W P-5'-P-CCNC: 24 U/L (ref 1–33)
ANION GAP SERPL CALCULATED.3IONS-SCNC: 8.9 MMOL/L (ref 5–15)
AST SERPL-CCNC: 21 U/L (ref 1–32)
BASOPHILS # BLD AUTO: 0.07 10*3/MM3 (ref 0–0.2)
BASOPHILS NFR BLD AUTO: 0.6 % (ref 0–1.5)
BILIRUB SERPL-MCNC: 1.1 MG/DL (ref 0–1.2)
BUN SERPL-MCNC: 14 MG/DL (ref 8–23)
BUN/CREAT SERPL: 17.1 (ref 7–25)
CALCIUM SPEC-SCNC: 9.2 MG/DL (ref 8.6–10.5)
CHLORIDE SERPL-SCNC: 103 MMOL/L (ref 98–107)
CO2 SERPL-SCNC: 25.1 MMOL/L (ref 22–29)
CREAT SERPL-MCNC: 0.82 MG/DL (ref 0.57–1)
DEPRECATED RDW RBC AUTO: 50.4 FL (ref 37–54)
EGFRCR SERPLBLD CKD-EPI 2021: 77.1 ML/MIN/1.73
EOSINOPHIL # BLD AUTO: 0.06 10*3/MM3 (ref 0–0.4)
EOSINOPHIL NFR BLD AUTO: 0.5 % (ref 0.3–6.2)
ERYTHROCYTE [DISTWIDTH] IN BLOOD BY AUTOMATED COUNT: 15.8 % (ref 12.3–15.4)
GLOBULIN UR ELPH-MCNC: 1.9 GM/DL
GLUCOSE SERPL-MCNC: 120 MG/DL (ref 65–99)
HCT VFR BLD AUTO: 36.4 % (ref 34–46.6)
HGB BLD-MCNC: 11.7 G/DL (ref 12–15.9)
LDH SERPL-CCNC: 300 U/L (ref 135–214)
LYMPHOCYTES # BLD AUTO: 0.6 10*3/MM3 (ref 0.7–3.1)
LYMPHOCYTES NFR BLD AUTO: 4.7 % (ref 19.6–45.3)
MAGNESIUM SERPL-MCNC: 2 MG/DL (ref 1.6–2.4)
MCH RBC QN AUTO: 28.5 PG (ref 26.6–33)
MCHC RBC AUTO-ENTMCNC: 32.1 G/DL (ref 31.5–35.7)
MCV RBC AUTO: 88.8 FL (ref 79–97)
MONOCYTES # BLD AUTO: 0.06 10*3/MM3 (ref 0.1–0.9)
MONOCYTES NFR BLD AUTO: 0.5 % (ref 5–12)
NEUTROPHILS NFR BLD AUTO: 11.92 10*3/MM3 (ref 1.7–7)
NEUTROPHILS NFR BLD AUTO: 93.7 % (ref 42.7–76)
PHOSPHATE SERPL-MCNC: 2.5 MG/DL (ref 2.5–4.5)
PLATELET # BLD AUTO: 131 10*3/MM3 (ref 140–450)
PMV BLD AUTO: 10.1 FL (ref 6–12)
POTASSIUM SERPL-SCNC: 4.2 MMOL/L (ref 3.5–5.2)
PROT SERPL-MCNC: 5.6 G/DL (ref 6–8.5)
RBC # BLD AUTO: 4.1 10*6/MM3 (ref 3.77–5.28)
SODIUM SERPL-SCNC: 137 MMOL/L (ref 136–145)
URATE SERPL-MCNC: 1.8 MG/DL (ref 2.4–5.7)
WBC NRBC COR # BLD AUTO: 12.71 10*3/MM3 (ref 3.4–10.8)

## 2025-01-27 PROCEDURE — 25010000002 HEPARIN LOCK FLUSH PER 10 UNITS: Performed by: STUDENT IN AN ORGANIZED HEALTH CARE EDUCATION/TRAINING PROGRAM

## 2025-01-27 PROCEDURE — 84550 ASSAY OF BLOOD/URIC ACID: CPT | Performed by: STUDENT IN AN ORGANIZED HEALTH CARE EDUCATION/TRAINING PROGRAM

## 2025-01-27 PROCEDURE — 85025 COMPLETE CBC W/AUTO DIFF WBC: CPT | Performed by: STUDENT IN AN ORGANIZED HEALTH CARE EDUCATION/TRAINING PROGRAM

## 2025-01-27 PROCEDURE — 83615 LACTATE (LD) (LDH) ENZYME: CPT | Performed by: STUDENT IN AN ORGANIZED HEALTH CARE EDUCATION/TRAINING PROGRAM

## 2025-01-27 PROCEDURE — 83735 ASSAY OF MAGNESIUM: CPT | Performed by: STUDENT IN AN ORGANIZED HEALTH CARE EDUCATION/TRAINING PROGRAM

## 2025-01-27 PROCEDURE — 84100 ASSAY OF PHOSPHORUS: CPT | Performed by: STUDENT IN AN ORGANIZED HEALTH CARE EDUCATION/TRAINING PROGRAM

## 2025-01-27 PROCEDURE — 80053 COMPREHEN METABOLIC PANEL: CPT | Performed by: STUDENT IN AN ORGANIZED HEALTH CARE EDUCATION/TRAINING PROGRAM

## 2025-01-27 PROCEDURE — 36591 DRAW BLOOD OFF VENOUS DEVICE: CPT

## 2025-01-27 RX ORDER — SODIUM CHLORIDE 0.9 % (FLUSH) 0.9 %
20 SYRINGE (ML) INJECTION AS NEEDED
Status: DISCONTINUED | OUTPATIENT
Start: 2025-01-27 | End: 2025-01-28 | Stop reason: HOSPADM

## 2025-01-27 RX ORDER — HEPARIN SODIUM (PORCINE) LOCK FLUSH IV SOLN 100 UNIT/ML 100 UNIT/ML
500 SOLUTION INTRAVENOUS AS NEEDED
Status: DISCONTINUED | OUTPATIENT
Start: 2025-01-27 | End: 2025-01-28 | Stop reason: HOSPADM

## 2025-01-27 RX ORDER — DAPSONE 100 MG/1
100 TABLET ORAL DAILY
Qty: 30 TABLET | Refills: 1 | Status: SHIPPED | OUTPATIENT
Start: 2025-01-27

## 2025-01-27 RX ORDER — HEPARIN SODIUM (PORCINE) LOCK FLUSH IV SOLN 100 UNIT/ML 100 UNIT/ML
500 SOLUTION INTRAVENOUS AS NEEDED
Status: CANCELLED | OUTPATIENT
Start: 2025-01-27

## 2025-01-27 RX ORDER — SODIUM CHLORIDE 0.9 % (FLUSH) 0.9 %
20 SYRINGE (ML) INJECTION AS NEEDED
Status: CANCELLED | OUTPATIENT
Start: 2025-01-27

## 2025-01-27 RX ADMIN — HEPARIN 500 UNITS: 100 SYRINGE at 10:20

## 2025-01-27 RX ADMIN — Medication 20 ML: at 10:20

## 2025-01-27 NOTE — TELEPHONE ENCOUNTER
Received a call from the pt stating that Dr. Sethi prescribed her a medication last week that she was unable to afford. She said she called requesting something different last week, but the same medication was sent in again. She stated that the medication costs over $500 and she would not be able to afford it. I asked what medication she was referring to; pt did not know the name. I asked if she tried using a copay card. She stated that she did, but it was still too expensive. Message sent to Dr. Sethi's nurse, Viky, who advised that they are working on switching her to a different medication.

## 2025-01-29 LAB
LAB AP CASE REPORT: NORMAL
PATH REPORT.FINAL DX SPEC: NORMAL
PATH REPORT.GROSS SPEC: NORMAL

## 2025-02-03 ENCOUNTER — HOSPITAL ENCOUNTER (OUTPATIENT)
Dept: ONCOLOGY | Facility: HOSPITAL | Age: 70
Discharge: HOME | End: 2025-02-03
Payer: MEDICARE

## 2025-02-03 ENCOUNTER — LAB (OUTPATIENT)
Dept: LAB | Facility: HOSPITAL | Age: 70
End: 2025-02-03
Payer: MEDICARE

## 2025-02-03 DIAGNOSIS — Z45.2 ENCOUNTER FOR CARE RELATED TO VASCULAR ACCESS PORT: Primary | ICD-10-CM

## 2025-02-03 DIAGNOSIS — C83.31 DIFFUSE LARGE B-CELL LYMPHOMA, LYMPH NODES OF HEAD, FACE, AND NECK: ICD-10-CM

## 2025-02-03 DIAGNOSIS — C85.80 LYMPHOMA MALIGNANT, LARGE CELL: ICD-10-CM

## 2025-02-03 LAB
ALBUMIN SERPL-MCNC: 3.8 G/DL (ref 3.5–5.2)
ALBUMIN/GLOB SERPL: 1.9 G/DL
ALP SERPL-CCNC: 72 U/L (ref 39–117)
ALT SERPL W P-5'-P-CCNC: 26 U/L (ref 1–33)
ANION GAP SERPL CALCULATED.3IONS-SCNC: 10.3 MMOL/L (ref 5–15)
AST SERPL-CCNC: 27 U/L (ref 1–32)
BASOPHILS # BLD AUTO: 0.03 10*3/MM3 (ref 0–0.2)
BASOPHILS NFR BLD AUTO: 0.2 % (ref 0–1.5)
BILIRUB SERPL-MCNC: 1.2 MG/DL (ref 0–1.2)
BUN SERPL-MCNC: 12 MG/DL (ref 8–23)
BUN/CREAT SERPL: 14.1 (ref 7–25)
CALCIUM SPEC-SCNC: 9.2 MG/DL (ref 8.6–10.5)
CHLORIDE SERPL-SCNC: 103 MMOL/L (ref 98–107)
CO2 SERPL-SCNC: 26.7 MMOL/L (ref 22–29)
CREAT SERPL-MCNC: 0.85 MG/DL (ref 0.57–1)
DEPRECATED RDW RBC AUTO: 52.9 FL (ref 37–54)
EGFRCR SERPLBLD CKD-EPI 2021: 73.8 ML/MIN/1.73
EOSINOPHIL # BLD AUTO: 0.04 10*3/MM3 (ref 0–0.4)
EOSINOPHIL NFR BLD AUTO: 0.3 % (ref 0.3–6.2)
ERYTHROCYTE [DISTWIDTH] IN BLOOD BY AUTOMATED COUNT: 17.1 % (ref 12.3–15.4)
GLOBULIN UR ELPH-MCNC: 2 GM/DL
GLUCOSE SERPL-MCNC: 74 MG/DL (ref 65–99)
HCT VFR BLD AUTO: 37.4 % (ref 34–46.6)
HGB BLD-MCNC: 11.7 G/DL (ref 12–15.9)
INR PPP: 1.13 (ref 0.9–1.1)
LDH SERPL-CCNC: 483 U/L (ref 135–214)
LYMPHOCYTES NFR BLD AUTO: ABNORMAL %
MAGNESIUM SERPL-MCNC: 2.1 MG/DL (ref 1.6–2.4)
MCH RBC QN AUTO: 28.4 PG (ref 26.6–33)
MCHC RBC AUTO-ENTMCNC: 31.3 G/DL (ref 31.5–35.7)
MCV RBC AUTO: 90.8 FL (ref 79–97)
MONOCYTES # BLD AUTO: 1.13 10*3/MM3 (ref 0.1–0.9)
MONOCYTES NFR BLD AUTO: 8 % (ref 5–12)
NEUTROPHILS NFR BLD AUTO: ABNORMAL %
PHOSPHATE SERPL-MCNC: 3 MG/DL (ref 2.5–4.5)
PLATELET # BLD AUTO: 141 10*3/MM3 (ref 140–450)
PMV BLD AUTO: 9.2 FL (ref 6–12)
POTASSIUM SERPL-SCNC: 3.2 MMOL/L (ref 3.5–5.2)
PROT SERPL-MCNC: 5.8 G/DL (ref 6–8.5)
PROTHROMBIN TIME: 14.5 SECONDS (ref 11.7–14.2)
RBC # BLD AUTO: 4.12 10*6/MM3 (ref 3.77–5.28)
SODIUM SERPL-SCNC: 140 MMOL/L (ref 136–145)
URATE SERPL-MCNC: 2.2 MG/DL (ref 2.4–5.7)
WBC NRBC COR # BLD AUTO: 14.08 10*3/MM3 (ref 3.4–10.8)

## 2025-02-03 PROCEDURE — 85025 COMPLETE CBC W/AUTO DIFF WBC: CPT

## 2025-02-03 PROCEDURE — 84550 ASSAY OF BLOOD/URIC ACID: CPT | Performed by: STUDENT IN AN ORGANIZED HEALTH CARE EDUCATION/TRAINING PROGRAM

## 2025-02-03 PROCEDURE — 36415 COLL VENOUS BLD VENIPUNCTURE: CPT

## 2025-02-03 PROCEDURE — 25010000002 HEPARIN LOCK FLUSH PER 10 UNITS: Performed by: STUDENT IN AN ORGANIZED HEALTH CARE EDUCATION/TRAINING PROGRAM

## 2025-02-03 PROCEDURE — 85610 PROTHROMBIN TIME: CPT | Performed by: STUDENT IN AN ORGANIZED HEALTH CARE EDUCATION/TRAINING PROGRAM

## 2025-02-03 PROCEDURE — 84100 ASSAY OF PHOSPHORUS: CPT | Performed by: STUDENT IN AN ORGANIZED HEALTH CARE EDUCATION/TRAINING PROGRAM

## 2025-02-03 PROCEDURE — 83615 LACTATE (LD) (LDH) ENZYME: CPT | Performed by: STUDENT IN AN ORGANIZED HEALTH CARE EDUCATION/TRAINING PROGRAM

## 2025-02-03 PROCEDURE — 80053 COMPREHEN METABOLIC PANEL: CPT | Performed by: STUDENT IN AN ORGANIZED HEALTH CARE EDUCATION/TRAINING PROGRAM

## 2025-02-03 PROCEDURE — G0463 HOSPITAL OUTPT CLINIC VISIT: HCPCS

## 2025-02-03 PROCEDURE — 96523 IRRIG DRUG DELIVERY DEVICE: CPT

## 2025-02-03 PROCEDURE — 83735 ASSAY OF MAGNESIUM: CPT | Performed by: STUDENT IN AN ORGANIZED HEALTH CARE EDUCATION/TRAINING PROGRAM

## 2025-02-03 RX ORDER — HEPARIN SODIUM (PORCINE) LOCK FLUSH IV SOLN 100 UNIT/ML 100 UNIT/ML
500 SOLUTION INTRAVENOUS AS NEEDED
Status: DISCONTINUED | OUTPATIENT
Start: 2025-02-03 | End: 2025-02-04 | Stop reason: HOSPADM

## 2025-02-03 RX ORDER — HEPARIN SODIUM (PORCINE) LOCK FLUSH IV SOLN 100 UNIT/ML 100 UNIT/ML
500 SOLUTION INTRAVENOUS AS NEEDED
OUTPATIENT
Start: 2025-02-03

## 2025-02-03 RX ORDER — SODIUM CHLORIDE 0.9 % (FLUSH) 0.9 %
20 SYRINGE (ML) INJECTION AS NEEDED
OUTPATIENT
Start: 2025-02-03

## 2025-02-03 RX ORDER — ALLOPURINOL 300 MG/1
300 TABLET ORAL DAILY
Qty: 30 TABLET | Refills: 0 | Status: SHIPPED | OUTPATIENT
Start: 2025-02-03

## 2025-02-03 RX ORDER — SODIUM CHLORIDE 0.9 % (FLUSH) 0.9 %
20 SYRINGE (ML) INJECTION AS NEEDED
Status: DISCONTINUED | OUTPATIENT
Start: 2025-02-03 | End: 2025-02-04 | Stop reason: HOSPADM

## 2025-02-03 RX ADMIN — Medication 20 ML: at 10:30

## 2025-02-03 RX ADMIN — HEPARIN 500 UNITS: 100 SYRINGE at 10:30

## 2025-02-03 NOTE — PROGRESS NOTES
Pt here for port access and weekly labs. Port accessed using sterile technique. Port did not have blood return noted-tried many positions. Patient did not want to stay for cathflo today but is aware it may need to be used next visit. Port flushed with NS and heparin prior to de-access. Lab appt made and patient had labs drawn by venipuncture. Pt discharged and is aware of next appt. Inbox message sent to  pool to see if patient can have the methotrexate and the stimufend on the same day on 2/14.

## 2025-02-04 ENCOUNTER — TELEPHONE (OUTPATIENT)
Dept: ONCOLOGY | Facility: CLINIC | Age: 70
End: 2025-02-04
Payer: MEDICARE

## 2025-02-04 RX ORDER — POTASSIUM CHLORIDE 1500 MG/1
40 TABLET, EXTENDED RELEASE ORAL 2 TIMES DAILY
Qty: 2 TABLET | Refills: 0 | Status: SHIPPED | OUTPATIENT
Start: 2025-02-04

## 2025-02-04 NOTE — TELEPHONE ENCOUNTER
----- Message from Ann Marie Horner sent at 2/3/2025  4:27 PM EST -----  Please let the patient know that her potassium level is mildly low.  Please send 40 mill equivalents of KCl x 1 dose and can be rechecked at next visit.  Thank you.

## 2025-02-04 NOTE — TELEPHONE ENCOUNTER
Spoke with patient and informed her of slightly low potassium level and the need for a 1 time dose of potassium. Prescription sent to patients pharmacy

## 2025-02-05 LAB
G6PD BLD QN: 326 U/10E12 RBC (ref 127–427)
RBC # BLD AUTO: 4.21 X10E6/UL (ref 3.77–5.28)

## 2025-02-10 DIAGNOSIS — C85.80 LYMPHOMA MALIGNANT, LARGE CELL: Primary | ICD-10-CM

## 2025-02-10 NOTE — PROGRESS NOTES
HEMATOLOGY ONCOLOGY OUTPATIENT FOLLOW UP       Patient name: Catarina Mclean  : 1955  MRN: [id number]  Primary Care Physician: Bozena Alamo NP  Referring Physician: Bozena Alamo NP  Reason For Consult:       History of Present Illness:  Patient is a 69-year-old female who has been referred to us for further evaluation and management of diffuse lymphadenopathy.    She reported having symptoms of worsening fatigue, poor appetite intermittent night sweats and palpable axillary adenopathy approximately 2 months ago.  She was seen by her PCP for the symptoms and was empirically treated with oral antibiotics with limited improvement in her symptoms.      Bilateral breast screening mammogram in 2024 was reported unremarkable.  [BI-RADS 1]    Subsequently a CT chest abdomen pelvis were ordered and is reported as follows:  2024: CT chest/abdomen/pelvis:  Impression:  1.Supra diaphragmatic and infra diaphragmatic lymphadenopathy concerning for lymphoma.     Her past medical history significant for non-Hodgkin lymphoma [likely DLBCL], diagnosed in -10  She reported that she was being followed by Dr. Barkley and had systemic therapy for lymphoma in [id number].  She did not follow-up with oncologist thereafter and was lost to follow-up.  Patient is unable to provide any additional details about her diagnosis or treatment.    Limited prior medical records from  were reviewed:    Patient underwent bilateral tonsillectomy in 2009,  Pathology: Left tonsil was reported to have involvement from malignant lymphoma, large cell type of B-cell origin.  [Of activated B-cell phenotype].  IHC staining was reported positive for CD45, CD20, Bcl-2 and MUM1 with Ki-67 98%.  The tumor was negative for BEV.    A bone marrow biopsy was reported negative for lymphoma involvement at that time.    Patient stated that she is up-to-date whether age-appropriate cancer screening  including annual screening mammograms, Pap smears and colonoscopies.  Last colonoscopy was approximately 10 years ago and was reported normal per patient.    Family history significant for unknown cancer in brother who has been recently diagnosed..      Patient presents for initial consultation today.  She reported having symptoms of generalized fatigue, poor appetite and palpable axillary adenopathy in right axilla and right supraclavicular area for last few weeks.  Denied any other respiratory or GI/ symptoms.  She reported having occasional night sweats but denied any weight loss or fever/chills.  No recent infections reported.    11/20/24: Lymph node, right axillary:  Large B-cell lymphoma  Immunohistochemistry  There is predominance of intermediate to large sized CD45 and CD20+ B-lymphocytes. CD3 and CD5 reveal numerous small T-lymphocytes. B-cells are negative for CD5, CD10, CD15, CD30, and BEV. They are positive for PAX-5, BCL-2, BCL-6, and MUM-1, as well as c-MYC (~40% of neoplastic cells). Ki-67 shows high proliferative rate (50-60%).    Flow Cytometry: Abnormal/ monotypic B-cell population (4% of sample)  Comment: Scatter properties compatible with increased cell size, cells characterized as:  CD45+, CD19+, CD20+, CD5-, CD10-, CD23-, FMC7-, CD30-, CD38-, CD43-/+, HLA  DR+, sIg lambda+    FISH PANEL: Abnormal Lymphoma FISH panel  Aneuploidy: gain of BCL6/3q, MYC/8q, and BCL2/18q  Additional IGH/14q    12/3/24: PET/CT:  Impression:  1. Hypermetabolic left cervical chain, bilateral supraclavicular, right axillary, right subpectoral, portacaval and retroperitoneal adenopathy consistent with known lymphoma.  2. Two small hypermetabolic splenic lesions likely also related to lymphoma. No splenomegaly.  3. Hypermetabolic osseous uptake associated with C3 vertebral body and right posterior 12th rib likely osseous involvement of lymphoma.  4. Additional incidental CT findings above.    12/10/24: Patient seen  today for follow-up visit to discuss her biopsy and imaging results.  She reported again having ongoing fatigue and decreased appetite.  Denied any new complaints today.    12/16/2024: Transthoracic echocardiogram:    Left ventricular ejection fraction appears to be 61 - 65%.    Left ventricular diastolic function is consistent with (grade Ia w/high LAP) impaired relaxation.    The left atrial cavity is dilated.    Estimated right ventricular systolic pressure from tricuspid regurgitation is normal (<35 mmHg).    No significant valvular abnormalities noted.    Extracardiac findings: Periportal lymph nodes are noted.      12/23/2024: Patient seen today for follow-up.  Has petechial rash on her chest and extremities which is new.  Has some ongoing fatigue but otherwise stable    12/25/24- 12/2724:   The patient was admitted to [facility] hospital with complaint of rash which began on face and spread to the chest. She was also noted to have thrombocytopenia with Plt count jacqueline at 58K. She was started on steroids and Atarax with improvement of rash and plt counts. The patient was discharged home on Medrol dose pack as well as Benadryl as needed in view of the rash.     1/10/25: Patient seen today for follow-up visit.  She reported clinically doing better, reported improved appetite and good energy levels today.  She was on tapering steroids since her hospitalization which she has completed approximately 4-5 days ago.  No new complaints reported.  Stated that her palpable supraclavicular and right axillary adenopathy has improved.  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    1/21/25: Patient seen today for follow-up visit.  Clinically doing well this morning, reportedly she had a severe allergic reaction to Bactrim recently for which she was seen at [facility] ER on 1/14/2025 after  she accidentally took Bactrim although this was discontinued previously.  She has recovered from her acute symptoms and is near her baseline today.  Reported good energy levels and appetite    Subjective:  2/11/2025: Marky is here today for her routine follow-up and evaluation for readiness for cycle 2 of treatment.  She reports that she has been doing very well.  Denies any side effects of treatment outside of hair loss.  Reports that she was seen at [facility] for evaluation for transplant but that due to her excellent response to treatment it is not recommended at this time.  She reports that she is no longer able to feel the right supraclavicular or right axillary lymph node.    Past Medical History:   Diagnosis Date    Drug therapy     Hyperlipidemia     Hypertension     Non Hodgkin's lymphoma 2009       Past Surgical History:   Procedure Laterality Date    BREAST BIOPSY      HYSTERECTOMY           Current Outpatient Medications:     acyclovir (ZOVIRAX) 400 MG tablet, Take 1 tablet by mouth 2 (Two) Times a Day. Take no more than 5 doses a day., Disp: 60 tablet, Rfl: 5    allopurinol (ZYLOPRIM) 300 MG tablet, TAKE 1 TABLET BY MOUTH DAILY, Disp: 30 tablet, Rfl: 0    amLODIPine (NORVASC) 5 MG tablet, Take 1 tablet by mouth Daily., Disp: , Rfl:     aspirin 81 MG chewable tablet, Chew Daily., Disp: , Rfl:     carvedilol (COREG) 6.25 MG tablet, Take 1 tablet by mouth 2 (Two) Times a Day., Disp: 180 tablet, Rfl: 3    dapsone 100 MG tablet, Take 1 tablet by mouth Daily., Disp: 30 tablet, Rfl: 1    fluconazole (DIFLUCAN) 150 MG tablet, , Disp: , Rfl:     lidocaine-prilocaine (EMLA) 2.5-2.5 % cream, Apply 1 Application topically to the appropriate area as directed As Needed (apply 1 hour prior to port access)., Disp: 30 g, Rfl: 2    lisinopril (PRINIVIL,ZESTRIL) 20 MG tablet, Take 1 tablet by mouth Daily., Disp: , Rfl:     Omega-3 Fatty Acids (fish oil) 1000 MG capsule capsule, Take 2 capsules by mouth Daily With  Breakfast., Disp: , Rfl:     ondansetron (ZOFRAN) 8 MG tablet, Take 1 tablet by mouth 3 (Three) Times a Day As Needed for Nausea or Vomiting., Disp: 30 tablet, Rfl: 5    potassium chloride (KLOR-CON M20) 20 MEQ CR tablet, Take 2 tablets by mouth 2 (Two) Times a Day., Disp: 2 tablet, Rfl: 0    pravastatin (PRAVACHOL) 20 MG tablet, Take 1 tablet by mouth Daily., Disp: , Rfl:     predniSONE (DELTASONE) 50 MG tablet, Take 2 tablets by mouth Daily. Take with food.  Bring the first dose to chemotherapy appointment., Disp: 10 tablet, Rfl: 5    traZODone (DESYREL) 50 MG tablet, , Disp: , Rfl:   No current facility-administered medications for this visit.    Facility-Administered Medications Ordered in Other Visits:     alteplase (CATHFLO/ACTIVASE) injection 2 mg, 2 mg, Intracatheter, Q2H PRN, Ann Marie Horner APRN, New Syringe/Cartridge at 02/11/25 0813    cycloPHOSphamide (CYTOXAN) 1,360 mg in sodium chloride 0.9 % 256.8 mL chemo IVPB, 750 mg/m2 (Treatment Plan Recorded), Intravenous, Once, Ann Marie Horner APRN    etoposide (TOPOSAR) 90 mg in sodium chloride 0.9 % 254.5 mL chemo IVPB, 50 mg/m2 (Treatment Plan Recorded), Intravenous, Once, Ann Marie Horner APRN    heparin injection 500 Units, 500 Units, Intravenous, PRN, Can Sethi MD    riTUXimab (RITUXAN) 680 mg in sodium chloride 0.9 % 318 mL IVPB, 375 mg/m2 (Treatment Plan Recorded), Intravenous, Once, Ann Marie Horner APRN    sodium chloride 0.9 % flush 20 mL, 20 mL, Intravenous, PRN, Can Sethi MD    vinCRIStine (ONCOVIN) 2 mg in sodium chloride 0.9 % 52 mL chemo IVPB, 2 mg, Intravenous, Once, Ann Marie Horner APRN    Allergies   Allergen Reactions    Sulfamethoxazole-Trimethoprim Swelling       Family History   Problem Relation Age of Onset    Liver cancer Brother     Breast cancer Maternal Aunt        Cancer-related family history includes Breast cancer in her maternal aunt; Liver cancer in her brother.      Social  "History     Tobacco Use    Smoking status: Never     Passive exposure: Never    Smokeless tobacco: Never   Vaping Use    Vaping status: Never Used   Substance Use Topics    Alcohol use: Yes     Alcohol/week: 10.0 standard drinks of alcohol     Types: 10 Cans of beer per week     Comment: WEEKLY    Drug use: Never     Social History     Social History Narrative    Not on file       ROS:   Review of Systems   Constitutional:  Positive for fatigue.   HENT: Negative.     Eyes: Negative.    Respiratory: Negative.     Cardiovascular: Negative.    Gastrointestinal: Negative.    Endocrine: Negative.    Genitourinary: Negative.    Musculoskeletal: Negative.    Skin: Negative.    Allergic/Immunologic: Negative.    Neurological: Negative.    Hematological: Negative.    Psychiatric/Behavioral: Negative.           Objective:    Vital Signs:  Vitals:    02/11/25 0805   BP: 149/82   Pulse: 61   Temp: 98.7 °F (37.1 °C)   SpO2: 91%   Weight: 78.9 kg (174 lb)   Height: 157.5 cm (62.01\")   PainSc: 0-No pain           Body mass index is 31.82 kg/m².    ECOG  (1) Restricted in physically strenuous activity, ambulatory and able to do work of light nature    Physical Exam:   Physical Exam  Constitutional:       General: She is not in acute distress.     Appearance: Normal appearance. She is normal weight.   HENT:      Head: Normocephalic and atraumatic.      Right Ear: External ear normal.      Left Ear: External ear normal.      Nose: Nose normal.      Mouth/Throat:      Mouth: Mucous membranes are moist.      Pharynx: Oropharynx is clear.   Eyes:      Extraocular Movements: Extraocular movements intact.      Conjunctiva/sclera: Conjunctivae normal.      Pupils: Pupils are equal, round, and reactive to light.   Cardiovascular:      Rate and Rhythm: Normal rate.      Pulses: Normal pulses.   Pulmonary:      Effort: Pulmonary effort is normal. No respiratory distress.   Abdominal:      General: Abdomen is flat.      Palpations: Abdomen is " "soft.   Musculoskeletal:         General: Normal range of motion.      Cervical back: Normal range of motion and neck supple.   Skin:     General: Skin is warm.   Neurological:      General: No focal deficit present.      Mental Status: She is alert and oriented to person, place, and time.   Psychiatric:         Mood and Affect: Mood normal.         Behavior: Behavior normal.         Thought Content: Thought content normal.         Judgment: Judgment normal.         Lab Results - Last 18 Months   Lab Units 02/11/25  0753 02/03/25  1037 01/27/25  1009   WBC 10*3/mm3 46.61* 14.08* 12.71*   HEMOGLOBIN g/dL 10.6* 11.7* 11.7*   HEMATOCRIT % 35.3 37.4 36.4   PLATELETS 10*3/mm3 176 141 131*   MCV fL 97.0 90.8 88.8     Lab Results - Last 18 Months   Lab Units 02/11/25  0815 02/03/25  1037 01/27/25  1009 01/20/25  0935   SODIUM mmol/L  --  140 137 138   POTASSIUM mmol/L  --  3.2* 4.2 4.1   CHLORIDE mmol/L  --  103 103 102   CO2 mmol/L  --  26.7 25.1 24.8   BUN mg/dL  --  12 14 8   CREATININE mg/dL 1.10 0.85 0.82 0.96   CALCIUM mg/dL  --  9.2 9.2 10.2   BILIRUBIN mg/dL  --  1.2 1.1 1.1   ALK PHOS U/L  --  72 102 72   ALT (SGPT) U/L  --  26 24 17   AST (SGOT) U/L  --  27 21 21   GLUCOSE mg/dL  --  74 120* 104*       Lab Results   Component Value Date    GLUCOSE 74 02/03/2025    BUN 12 02/03/2025    CREATININE 1.10 02/11/2025    BCR 14.1 02/03/2025    K 3.2 (L) 02/03/2025    CO2 26.7 02/03/2025    CALCIUM 9.2 02/03/2025    ALBUMIN 3.8 02/03/2025    AST 27 02/03/2025    ALT 26 02/03/2025       Lab Results - Last 18 Months   Lab Units 02/03/25  1037 01/24/25  1122 01/16/25  1106 12/25/24  1130   INR  1.13* 1.11* 1.01 1.05   APTT seconds  --  32.3 30.2 35.8*       No results found for: \"IRON\", \"TIBC\", \"FERRITIN\"    No results found for: \"FOLATE\"    No results found for: \"OCCULTBLD\"    No results found for: \"RETICCTPCT\"  No results found for: \"PGBLZDYA66\"  No results found for: \"SPEP\", \"UPEP\"  LDH   Date Value Ref Range Status " "  02/03/2025 483 (H) 135 - 214 U/L Final     Uric Acid   Date Value Ref Range Status   02/03/2025 2.2 (L) 2.4 - 5.7 mg/dL Final     Lab Results   Component Value Date    OTTONIEL Positive (A) 11/20/2024    SEDRATE 4 12/10/2024     No results found for: \"FIBRINOGEN\", \"HAPTOGLOBIN\"  Lab Results   Component Value Date    PTT 32.3 01/24/2025    INR 1.13 (H) 02/03/2025     No results found for: \"\"  No results found for: \"CEA\"  No components found for: \"CA-19-9\"  No results found for: \"PSA\"  Lab Results   Component Value Date    SEDRATE 4 12/10/2024          Assessment & Plan     Generalized lymphadenopathy:  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  Patient has remote history of aggressive NHL, status post chemotherapy in 2009-10.  -CT chest/abdomen/pelvis reviewed, noted to have extensive lymphadenopathy involving supraclavicular, axillary and intra-abdominal lymph nodes.  No solid organ masses noted concerning for solid organ metastasis.  -Axillary lymph node biopsy and PET/CT findings reviewed as above.  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  Ki-67 is elevated at 50-60%  -I reviewed these findings with patient in detail.  Given extensive disease and aggressive disease biology, she was initially being considered for dose adjusted R-EPOCH regimen, I also discussed her case with [facility] BMT attending Dr. Zuleta. Due to concern about prior exposure to anthracyclines during at time of her initial diagnosis and treatment in 2009-10, repeat treatment with anthracycline based regimen may carry risk of significant cardiotoxicity.  She has been since evaluated at [facility] [place] BMT clinic by Dr. Hirsch who recommended to start patient on R-CEOP regimen for total 6 cycles replacing Doxorubicin with Etoposide.  -This " plan was discussed in detail with patient today.  Potential treatment related adverse effects were explained to her.  She verbalized understanding and is agreeable to treatment.   -patient is planned for C2D1 R-CEOP today. Labs are adequate, okay to proceed with treatment.    CNS prophylaxis:    The patient will need CNS prophylaxis  with IT methotrexate with systemic therapy given high risk for CNS involvement [CNS IPI 4, associated with high risk of CNS involvement].  -patient is scheduled for IT Chemotherapy with methotrexate on 1/24/25.      Cardiac health: Detailed medical records pertaining to her prior cancer treatment are not available, however it seems she had outpatient chemotherapy with R-CHOP or similar regimen containing anthracyclines..  Will try to obtain these records but there is significant concern about patient crossing the maximum recommended lifetime dose for anthracyclines with her planned lymphoma treatment.  -Discussed her case with cardiology [Dr. García].  She was referred to cardiology clinic to discuss cardiac risk reduction and to start prophylactic treatment.   -Initial echocardiogram reported normal with EF 61-65%  She has been started on Coreg and Jardiance.       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  Will continue throughout treatment.  Reviewed with the patient.    ID: Started patient on antiviral and PCP prophylaxis with acyclovir and Bactrim. Bactrim has been held due to concern about drug induced rash and thrombocytopenia.  Will start patient on Daily Atovaquone.  Reviewed with the patient.    Skin Rash:   Thrombocytopenia:  This has improved. Patient is s/p completion of tapering steroids.   Persistent thrombocytopenia could be secondary to accidental use of bactrim recently.  Resolved      Bactrim allergy : Patient has had severe symptoms following accidentally taking Bactrim recently requiring her to be seen in ER, she was  treated with IV Solu-Medrol, Benadryl and Pepcid.  Continue to hold off on Bactrim moving forward.      Leukocytosis: Secondary to G-CSF and steroids    Follow-up in 3 weeks with Dr. Sethi with treatment or sooner if needed  Continue weekly labs as planned.      Thank you very much for providing the opportunity to participate in this patient’s care. Please do not hesitate to call if there are any other questions.

## 2025-02-11 ENCOUNTER — OFFICE VISIT (OUTPATIENT)
Dept: ONCOLOGY | Facility: CLINIC | Age: 70
End: 2025-02-11
Payer: MEDICARE

## 2025-02-11 ENCOUNTER — HOSPITAL ENCOUNTER (OUTPATIENT)
Dept: ONCOLOGY | Facility: HOSPITAL | Age: 70
Discharge: HOME | End: 2025-02-11
Payer: MEDICARE

## 2025-02-11 VITALS
DIASTOLIC BLOOD PRESSURE: 82 MMHG | SYSTOLIC BLOOD PRESSURE: 149 MMHG | OXYGEN SATURATION: 91 % | TEMPERATURE: 98.7 F | HEIGHT: 62 IN | BODY MASS INDEX: 32.02 KG/M2 | WEIGHT: 174 LBS | HEART RATE: 61 BPM

## 2025-02-11 VITALS — HEART RATE: 60 BPM | SYSTOLIC BLOOD PRESSURE: 126 MMHG | DIASTOLIC BLOOD PRESSURE: 70 MMHG

## 2025-02-11 DIAGNOSIS — Z45.2 ENCOUNTER FOR CARE RELATED TO VASCULAR ACCESS PORT: Primary | ICD-10-CM

## 2025-02-11 DIAGNOSIS — C83.31 DIFFUSE LARGE B-CELL LYMPHOMA, LYMPH NODES OF HEAD, FACE, AND NECK: Primary | ICD-10-CM

## 2025-02-11 DIAGNOSIS — C85.80 LYMPHOMA MALIGNANT, LARGE CELL: Primary | ICD-10-CM

## 2025-02-11 DIAGNOSIS — Z51.11 ENCOUNTER FOR ANTINEOPLASTIC CHEMOTHERAPY: ICD-10-CM

## 2025-02-11 DIAGNOSIS — C83.31 DIFFUSE LARGE B-CELL LYMPHOMA, LYMPH NODES OF HEAD, FACE, AND NECK: ICD-10-CM

## 2025-02-11 DIAGNOSIS — C85.80 LYMPHOMA MALIGNANT, LARGE CELL: ICD-10-CM

## 2025-02-11 LAB
ALBUMIN SERPL-MCNC: 4.1 G/DL (ref 3.5–5.2)
ALBUMIN/GLOB SERPL: 2.2 G/DL
ALP BLD-CCNC: 70 U/L (ref 42–141)
ALP SERPL-CCNC: 72 U/L (ref 39–117)
ALT SERPL W P-5'-P-CCNC: 23 U/L (ref 1–33)
ANION GAP SERPL CALCULATED.3IONS-SCNC: 14.6 MMOL/L (ref 5–15)
AST SERPL-CCNC: 19 U/L (ref 1–32)
BASOPHILS NFR BLD AUTO: ABNORMAL %
BILIRUB SERPL-MCNC: 2.5 MG/DL (ref 0–1.2)
BUN BLDA-MCNC: 15 MG/DL (ref 7–22)
BUN SERPL-MCNC: 16 MG/DL (ref 8–23)
BUN/CREAT SERPL: 16.8 (ref 7–25)
CALCIUM BLD QL: 9.2 MG/DL (ref 8–10.3)
CALCIUM SPEC-SCNC: 9.1 MG/DL (ref 8.6–10.5)
CHLORIDE BLDA-SCNC: 104 MMOL/L (ref 98–108)
CHLORIDE SERPL-SCNC: 100 MMOL/L (ref 98–107)
CO2 BLDA-SCNC: 28 MMOL/L (ref 18–33)
CO2 SERPL-SCNC: 23.4 MMOL/L (ref 22–29)
CREAT BLDA-MCNC: 1.1 MG/DL (ref 0.6–1.2)
CREAT SERPL-MCNC: 0.95 MG/DL (ref 0.57–1)
DEPRECATED RDW RBC AUTO: 62.7 FL (ref 37–54)
EGFRCR SERPLBLD CKD-EPI 2021: 54.2 ML/MIN/1.73
EGFRCR SERPLBLD CKD-EPI 2021: 64.6 ML/MIN/1.73
EOSINOPHIL # BLD AUTO: 0 10*3/MM3 (ref 0–0.4)
EOSINOPHIL NFR BLD AUTO: 0 % (ref 0.3–6.2)
ERYTHROCYTE [DISTWIDTH] IN BLOOD BY AUTOMATED COUNT: 20.5 % (ref 12.3–15.4)
GLOBULIN UR ELPH-MCNC: 1.9 GM/DL
GLUCOSE BLDC GLUCOMTR-MCNC: 133 MG/DL (ref 73–118)
GLUCOSE SERPL-MCNC: 135 MG/DL (ref 65–99)
HCT VFR BLD AUTO: 35.3 % (ref 34–46.6)
HGB BLD-MCNC: 10.6 G/DL (ref 12–15.9)
LYMPHOCYTES # BLD AUTO: 3.74 10*3/MM3 (ref 0.7–3.1)
LYMPHOCYTES NFR BLD AUTO: 8 % (ref 19.6–45.3)
MAGNESIUM SERPL-MCNC: 2.1 MG/DL (ref 1.6–2.4)
MCH RBC QN AUTO: 29.1 PG (ref 26.6–33)
MCHC RBC AUTO-ENTMCNC: 30 G/DL (ref 31.5–35.7)
MCV RBC AUTO: 97 FL (ref 79–97)
MONOCYTES # BLD AUTO: 1.98 10*3/MM3 (ref 0.1–0.9)
MONOCYTES NFR BLD AUTO: 4.2 % (ref 5–12)
NEUTROPHILS NFR BLD AUTO: ABNORMAL %
PHOSPHATE SERPL-MCNC: 2.9 MG/DL (ref 2.5–4.5)
PLATELET # BLD AUTO: 176 10*3/MM3 (ref 140–450)
PMV BLD AUTO: 9.8 FL (ref 6–12)
POC ALBUMIN: 3.3 G/L (ref 3.3–5.5)
POC ALT (SGPT): 24 U/L (ref 10–47)
POC AST (SGOT): 24 U/L (ref 11–38)
POC TOTAL BILIRUBIN: 3 MG/DL (ref 0.2–1.6)
POC TOTAL PROTEIN: 5.6 G/DL (ref 6.4–8.1)
POTASSIUM BLDA-SCNC: 3.3 MMOL/L (ref 3.6–5.1)
POTASSIUM SERPL-SCNC: 3.6 MMOL/L (ref 3.5–5.2)
PROT SERPL-MCNC: 6 G/DL (ref 6–8.5)
RBC # BLD AUTO: 3.64 10*6/MM3 (ref 3.77–5.28)
SODIUM BLD-SCNC: 136 MMOL/L (ref 128–145)
SODIUM SERPL-SCNC: 138 MMOL/L (ref 136–145)
URATE SERPL-MCNC: 2.1 MG/DL (ref 2.4–5.7)
WBC NRBC COR # BLD AUTO: 46.61 10*3/MM3 (ref 3.4–10.8)

## 2025-02-11 PROCEDURE — 96411 CHEMO IV PUSH ADDL DRUG: CPT

## 2025-02-11 PROCEDURE — A9270 NON-COVERED ITEM OR SERVICE: HCPCS | Performed by: NURSE PRACTITIONER

## 2025-02-11 PROCEDURE — 25010000002 CYCLOPHOSPHAMIDE 2 GM/10ML SOLUTION 10 ML VIAL: Performed by: NURSE PRACTITIONER

## 2025-02-11 PROCEDURE — 80053 COMPREHEN METABOLIC PANEL: CPT | Performed by: STUDENT IN AN ORGANIZED HEALTH CARE EDUCATION/TRAINING PROGRAM

## 2025-02-11 PROCEDURE — 85025 COMPLETE CBC W/AUTO DIFF WBC: CPT | Performed by: STUDENT IN AN ORGANIZED HEALTH CARE EDUCATION/TRAINING PROGRAM

## 2025-02-11 PROCEDURE — 25010000002 PALONOSETRON 0.25 MG/5ML SOLUTION PREFILLED SYRINGE: Performed by: NURSE PRACTITIONER

## 2025-02-11 PROCEDURE — 25010000002 ETOPOSIDE 1 GM/50ML SOLUTION 50 ML VIAL: Performed by: NURSE PRACTITIONER

## 2025-02-11 PROCEDURE — 25010000002 VINCRISTINE SULFATE 2 MG/2ML SOLUTION 2 ML VIAL: Performed by: NURSE PRACTITIONER

## 2025-02-11 PROCEDURE — 96413 CHEMO IV INFUSION 1 HR: CPT

## 2025-02-11 PROCEDURE — 96375 TX/PRO/DX INJ NEW DRUG ADDON: CPT

## 2025-02-11 PROCEDURE — 63710000001 ACETAMINOPHEN 325 MG TABLET: Performed by: NURSE PRACTITIONER

## 2025-02-11 PROCEDURE — 96417 CHEMO IV INFUS EACH ADDL SEQ: CPT

## 2025-02-11 PROCEDURE — 25810000003 SODIUM CHLORIDE 0.9 % SOLUTION: Performed by: NURSE PRACTITIONER

## 2025-02-11 PROCEDURE — 25810000003 SODIUM CHLORIDE 0.9 % SOLUTION 250 ML FLEX CONT: Performed by: NURSE PRACTITIONER

## 2025-02-11 PROCEDURE — 84550 ASSAY OF BLOOD/URIC ACID: CPT | Performed by: STUDENT IN AN ORGANIZED HEALTH CARE EDUCATION/TRAINING PROGRAM

## 2025-02-11 PROCEDURE — 25010000002 RITUXIMAB 10 MG/ML SOLUTION 50 ML VIAL: Performed by: NURSE PRACTITIONER

## 2025-02-11 PROCEDURE — 83735 ASSAY OF MAGNESIUM: CPT | Performed by: STUDENT IN AN ORGANIZED HEALTH CARE EDUCATION/TRAINING PROGRAM

## 2025-02-11 PROCEDURE — 96415 CHEMO IV INFUSION ADDL HR: CPT

## 2025-02-11 PROCEDURE — 84100 ASSAY OF PHOSPHORUS: CPT | Performed by: STUDENT IN AN ORGANIZED HEALTH CARE EDUCATION/TRAINING PROGRAM

## 2025-02-11 PROCEDURE — 25010000002 RITUXIMAB 10 MG/ML SOLUTION 10 ML VIAL: Performed by: NURSE PRACTITIONER

## 2025-02-11 PROCEDURE — 25010000002 ALTEPLASE 2 MG RECONSTITUTED SOLUTION: Performed by: NURSE PRACTITIONER

## 2025-02-11 PROCEDURE — 80053 COMPREHEN METABOLIC PANEL: CPT

## 2025-02-11 PROCEDURE — 36593 DECLOT VASCULAR DEVICE: CPT

## 2025-02-11 PROCEDURE — 25010000002 DIPHENHYDRAMINE PER 50 MG: Performed by: NURSE PRACTITIONER

## 2025-02-11 PROCEDURE — 25010000002 HEPARIN LOCK FLUSH PER 10 UNITS: Performed by: STUDENT IN AN ORGANIZED HEALTH CARE EDUCATION/TRAINING PROGRAM

## 2025-02-11 RX ORDER — DIPHENHYDRAMINE HYDROCHLORIDE 50 MG/ML
50 INJECTION INTRAMUSCULAR; INTRAVENOUS ONCE
Status: COMPLETED | OUTPATIENT
Start: 2025-02-11 | End: 2025-02-11

## 2025-02-11 RX ORDER — HYDROCORTISONE SODIUM SUCCINATE 100 MG/2ML
100 INJECTION INTRAMUSCULAR; INTRAVENOUS AS NEEDED
Status: CANCELLED | OUTPATIENT
Start: 2025-02-11

## 2025-02-11 RX ORDER — SODIUM CHLORIDE 0.9 % (FLUSH) 0.9 %
20 SYRINGE (ML) INJECTION AS NEEDED
Status: CANCELLED | OUTPATIENT
Start: 2025-02-11

## 2025-02-11 RX ORDER — MEPERIDINE HYDROCHLORIDE 25 MG/ML
25 INJECTION INTRAMUSCULAR; INTRAVENOUS; SUBCUTANEOUS
Status: CANCELLED | OUTPATIENT
Start: 2025-02-11

## 2025-02-11 RX ORDER — PALONOSETRON 0.05 MG/ML
0.25 INJECTION, SOLUTION INTRAVENOUS ONCE
Status: COMPLETED | OUTPATIENT
Start: 2025-02-11 | End: 2025-02-11

## 2025-02-11 RX ORDER — ACETAMINOPHEN 325 MG/1
650 TABLET ORAL ONCE
Status: COMPLETED | OUTPATIENT
Start: 2025-02-11 | End: 2025-02-11

## 2025-02-11 RX ORDER — SODIUM CHLORIDE 0.9 % (FLUSH) 0.9 %
20 SYRINGE (ML) INJECTION AS NEEDED
Status: DISCONTINUED | OUTPATIENT
Start: 2025-02-11 | End: 2025-02-12 | Stop reason: HOSPADM

## 2025-02-11 RX ORDER — HEPARIN SODIUM (PORCINE) LOCK FLUSH IV SOLN 100 UNIT/ML 100 UNIT/ML
500 SOLUTION INTRAVENOUS AS NEEDED
Status: CANCELLED | OUTPATIENT
Start: 2025-02-11

## 2025-02-11 RX ORDER — FAMOTIDINE 10 MG/ML
20 INJECTION, SOLUTION INTRAVENOUS AS NEEDED
Status: CANCELLED | OUTPATIENT
Start: 2025-02-11

## 2025-02-11 RX ORDER — TRAZODONE HYDROCHLORIDE 50 MG/1
TABLET, FILM COATED ORAL
COMMUNITY
Start: 2025-02-01

## 2025-02-11 RX ORDER — SODIUM CHLORIDE 9 MG/ML
20 INJECTION, SOLUTION INTRAVENOUS ONCE
Status: COMPLETED | OUTPATIENT
Start: 2025-02-11 | End: 2025-02-11

## 2025-02-11 RX ORDER — HEPARIN SODIUM (PORCINE) LOCK FLUSH IV SOLN 100 UNIT/ML 100 UNIT/ML
500 SOLUTION INTRAVENOUS AS NEEDED
Status: DISCONTINUED | OUTPATIENT
Start: 2025-02-11 | End: 2025-02-12 | Stop reason: HOSPADM

## 2025-02-11 RX ORDER — DIPHENHYDRAMINE HYDROCHLORIDE 50 MG/ML
50 INJECTION INTRAMUSCULAR; INTRAVENOUS AS NEEDED
Status: CANCELLED | OUTPATIENT
Start: 2025-02-11

## 2025-02-11 RX ADMIN — SODIUM CHLORIDE 90 MG: 0.9 INJECTION, SOLUTION INTRAVENOUS at 12:54

## 2025-02-11 RX ADMIN — SODIUM CHLORIDE 20 ML/HR: 9 INJECTION, SOLUTION INTRAVENOUS at 09:15

## 2025-02-11 RX ADMIN — PALONOSETRON 0.25 MG: 0.25 INJECTION, SOLUTION INTRAVENOUS at 09:17

## 2025-02-11 RX ADMIN — ALTEPLASE: 2.2 INJECTION, POWDER, LYOPHILIZED, FOR SOLUTION INTRAVENOUS at 08:13

## 2025-02-11 RX ADMIN — Medication 20 ML: at 14:19

## 2025-02-11 RX ADMIN — ACETAMINOPHEN 650 MG: 325 TABLET ORAL at 09:12

## 2025-02-11 RX ADMIN — CYCLOPHOSPHAMIDE 1360 MG: 2 INJECTION INTRAVENOUS at 12:18

## 2025-02-11 RX ADMIN — VINCRISTINE SULFATE 2 MG: 1 INJECTION, SOLUTION INTRAVENOUS at 14:00

## 2025-02-11 RX ADMIN — DIPHENHYDRAMINE HYDROCHLORIDE 50 MG: 50 INJECTION INTRAMUSCULAR; INTRAVENOUS at 09:22

## 2025-02-11 RX ADMIN — HEPARIN 500 UNITS: 100 SYRINGE at 14:19

## 2025-02-11 RX ADMIN — RITUXIMAB 680 MG: 10 INJECTION, SOLUTION INTRAVENOUS at 09:40

## 2025-02-11 NOTE — PROGRESS NOTES
Using sterile technique, port accessed for blood collection and treatment. Port flushed easily, but no blood return. Cathflo instilled per protocol and labs collected via venipuncture. Pt taken to clinic for follow-up visit with Ann Marie WILL.

## 2025-02-11 NOTE — PROGRESS NOTES
Patient arrived to infusion with port accessed and cathflo instilled. Attempted blood return after 30 minutes and got good blood return. Port flushed and saline locked. The following secure chat sent to Ann Marei with labs: Her labs are resulted. We are okay to treat with high WBC? Potassium is still low, 3.3. Ann Marie replied: Let me look at the full CBC, but it is likely from the filgrastim-she had no s/s of infection. Can you send out a CMP also?   Ann Marie signed plan-patient okay to treat. CMP sent to hospital for processing.   CMP resulted and potassium is WNL. Patient tolerated treatment. Port flushed and heparin locked prior to needle removal. Patient discharged with AVS.

## 2025-02-11 NOTE — ADDENDUM NOTE
Encounter addended by: Leonard Fonseca on: 2/11/2025 8:51 AM   Actions taken: Child order released for a procedure order

## 2025-02-12 ENCOUNTER — TELEPHONE (OUTPATIENT)
Dept: ONCOLOGY | Facility: CLINIC | Age: 70
End: 2025-02-12
Payer: MEDICARE

## 2025-02-12 ENCOUNTER — HOSPITAL ENCOUNTER (OUTPATIENT)
Dept: ONCOLOGY | Facility: HOSPITAL | Age: 70
Discharge: HOME | End: 2025-02-12
Admitting: NURSE PRACTITIONER
Payer: MEDICARE

## 2025-02-12 ENCOUNTER — HOSPITAL ENCOUNTER (OUTPATIENT)
Dept: GENERAL RADIOLOGY | Facility: HOSPITAL | Age: 70
Discharge: HOME | End: 2025-02-12
Admitting: NURSE PRACTITIONER
Payer: MEDICARE

## 2025-02-12 VITALS
HEART RATE: 91 BPM | DIASTOLIC BLOOD PRESSURE: 68 MMHG | RESPIRATION RATE: 16 BRPM | WEIGHT: 174.6 LBS | OXYGEN SATURATION: 92 % | TEMPERATURE: 97.3 F | HEIGHT: 62 IN | SYSTOLIC BLOOD PRESSURE: 158 MMHG | BODY MASS INDEX: 32.13 KG/M2

## 2025-02-12 DIAGNOSIS — E80.6 HYPERBILIRUBINEMIA: Primary | ICD-10-CM

## 2025-02-12 DIAGNOSIS — C83.31 DIFFUSE LARGE B-CELL LYMPHOMA, LYMPH NODES OF HEAD, FACE, AND NECK: ICD-10-CM

## 2025-02-12 DIAGNOSIS — C85.80 LYMPHOMA MALIGNANT, LARGE CELL: Primary | ICD-10-CM

## 2025-02-12 DIAGNOSIS — R09.02 HYPOXIA: ICD-10-CM

## 2025-02-12 DIAGNOSIS — E80.6 HYPERBILIRUBINEMIA: ICD-10-CM

## 2025-02-12 DIAGNOSIS — Z45.2 ENCOUNTER FOR CARE RELATED TO VASCULAR ACCESS PORT: ICD-10-CM

## 2025-02-12 DIAGNOSIS — C83.31 DIFFUSE LARGE B-CELL LYMPHOMA, LYMPH NODES OF HEAD, FACE, AND NECK: Primary | ICD-10-CM

## 2025-02-12 DIAGNOSIS — Z51.11 ENCOUNTER FOR ANTINEOPLASTIC CHEMOTHERAPY: Primary | ICD-10-CM

## 2025-02-12 LAB
ALBUMIN SERPL-MCNC: 4 G/DL (ref 3.5–5.2)
ALBUMIN/GLOB SERPL: 2 G/DL
ALP SERPL-CCNC: 65 U/L (ref 39–117)
ALT SERPL W P-5'-P-CCNC: 27 U/L (ref 1–33)
ANION GAP SERPL CALCULATED.3IONS-SCNC: 9.6 MMOL/L (ref 5–15)
AST SERPL-CCNC: 26 U/L (ref 1–32)
BILIRUB CONJ SERPL-MCNC: 1.1 MG/DL (ref 0–0.3)
BILIRUB INDIRECT SERPL-MCNC: 1.9 MG/DL
BILIRUB SERPL-MCNC: 3 MG/DL (ref 0–1.2)
BILIRUB SERPL-MCNC: 3.1 MG/DL (ref 0–1.2)
BUN SERPL-MCNC: 12 MG/DL (ref 8–23)
BUN/CREAT SERPL: 15.4 (ref 7–25)
CALCIUM SPEC-SCNC: 8.9 MG/DL (ref 8.6–10.5)
CHLORIDE SERPL-SCNC: 102 MMOL/L (ref 98–107)
CO2 SERPL-SCNC: 25.4 MMOL/L (ref 22–29)
CREAT SERPL-MCNC: 0.78 MG/DL (ref 0.57–1)
EGFRCR SERPLBLD CKD-EPI 2021: 81.8 ML/MIN/1.73
GLOBULIN UR ELPH-MCNC: 2 GM/DL
GLUCOSE SERPL-MCNC: 136 MG/DL (ref 65–99)
POTASSIUM SERPL-SCNC: 4.3 MMOL/L (ref 3.5–5.2)
PROT SERPL-MCNC: 6 G/DL (ref 6–8.5)
SODIUM SERPL-SCNC: 137 MMOL/L (ref 136–145)

## 2025-02-12 PROCEDURE — 82248 BILIRUBIN DIRECT: CPT | Performed by: NURSE PRACTITIONER

## 2025-02-12 PROCEDURE — 96413 CHEMO IV INFUSION 1 HR: CPT

## 2025-02-12 PROCEDURE — 82247 BILIRUBIN TOTAL: CPT | Performed by: NURSE PRACTITIONER

## 2025-02-12 PROCEDURE — 25810000003 SODIUM CHLORIDE 0.9 % SOLUTION 250 ML FLEX CONT: Performed by: NURSE PRACTITIONER

## 2025-02-12 PROCEDURE — 25010000002 HEPARIN LOCK FLUSH PER 10 UNITS: Performed by: STUDENT IN AN ORGANIZED HEALTH CARE EDUCATION/TRAINING PROGRAM

## 2025-02-12 PROCEDURE — 25010000002 ETOPOSIDE 1 GM/50ML SOLUTION 50 ML VIAL: Performed by: NURSE PRACTITIONER

## 2025-02-12 PROCEDURE — 71046 X-RAY EXAM CHEST 2 VIEWS: CPT

## 2025-02-12 PROCEDURE — 80053 COMPREHEN METABOLIC PANEL: CPT | Performed by: NURSE PRACTITIONER

## 2025-02-12 RX ORDER — HEPARIN SODIUM (PORCINE) LOCK FLUSH IV SOLN 100 UNIT/ML 100 UNIT/ML
500 SOLUTION INTRAVENOUS AS NEEDED
Status: DISCONTINUED | OUTPATIENT
Start: 2025-02-12 | End: 2025-02-13 | Stop reason: HOSPADM

## 2025-02-12 RX ORDER — HEPARIN SODIUM (PORCINE) LOCK FLUSH IV SOLN 100 UNIT/ML 100 UNIT/ML
500 SOLUTION INTRAVENOUS AS NEEDED
Status: CANCELLED | OUTPATIENT
Start: 2025-02-12

## 2025-02-12 RX ORDER — SODIUM CHLORIDE 0.9 % (FLUSH) 0.9 %
20 SYRINGE (ML) INJECTION AS NEEDED
Status: DISCONTINUED | OUTPATIENT
Start: 2025-02-12 | End: 2025-02-13 | Stop reason: HOSPADM

## 2025-02-12 RX ORDER — SODIUM CHLORIDE 0.9 % (FLUSH) 0.9 %
20 SYRINGE (ML) INJECTION AS NEEDED
Status: CANCELLED | OUTPATIENT
Start: 2025-02-12

## 2025-02-12 RX ORDER — SODIUM CHLORIDE 9 MG/ML
20 INJECTION, SOLUTION INTRAVENOUS ONCE
Status: CANCELLED | OUTPATIENT
Start: 2025-02-12

## 2025-02-12 RX ORDER — SODIUM CHLORIDE 9 MG/ML
20 INJECTION, SOLUTION INTRAVENOUS ONCE
Status: DISCONTINUED | OUTPATIENT
Start: 2025-02-12 | End: 2025-02-13 | Stop reason: HOSPADM

## 2025-02-12 RX ORDER — SODIUM CHLORIDE 9 MG/ML
20 INJECTION, SOLUTION INTRAVENOUS ONCE
Status: CANCELLED | OUTPATIENT
Start: 2025-02-13

## 2025-02-12 RX ADMIN — Medication 20 ML: at 14:16

## 2025-02-12 RX ADMIN — HEPARIN 500 UNITS: 100 SYRINGE at 14:16

## 2025-02-12 RX ADMIN — SODIUM CHLORIDE 90 MG: 0.9 INJECTION, SOLUTION INTRAVENOUS at 13:16

## 2025-02-12 NOTE — TELEPHONE ENCOUNTER
----- Message from Ann Marie Evens sent at 2/12/2025  3:12 PM EST -----  She had an oxygen level today at the visit that was lower than normal for her.  They were able to get a 92% with warming up her hand.  Her chest x-ray looks negative specifically no pneumonia.  Please just let her know if she were to develop any shortness of breath, fever or other symptoms to notify the office.  Thank you.

## 2025-02-12 NOTE — PROGRESS NOTES
Port accessed by Gloria Conde RN with sterile technique and positive blood return noted.  Blood drawn from port.  10 cc blood wasted prior to specimen collection. Specimens sent to lab for processing. The following secure chat sent to Ann Marie: Patient in clinic for C2 D2 CEOP for lymphoma. /68, HR 91. Patient had a rough night after her long day yesterday and hasn't eaten or drank much. I will give her a bottle of water with Biolyte. Jes got her O2 at 88% on arrival. I let her sit for awhile and then retook it and it was 86%. I had her take some deep breaths and got her up to 90%. Please sign plan.   Ann Marie ordered a walking test to check oxygen level. Ann Marie was notified that we walked for 4 minutes. Her oxygen stayed between 88-90%; it never dropped below 88.  Once placed on oxygen, 2L, her oxygen saturation stay between 89-90%. The highest reading I was able to get was 92%. A chest xray was ordered to be cautious. Patient was instructed to go to [facility] registration after her infusion to have chest xray done; patient verbalized understanding. Patient tolerated treatment. Discharged, declined AVS.

## 2025-02-12 NOTE — TELEPHONE ENCOUNTER
Spoke with patient informed her chest xray was normal. Advised patient to let us know if she develops any shortness of breath, fever, or any of symptoms. Patient voices understanding and states she is fine right now

## 2025-02-13 ENCOUNTER — HOSPITAL ENCOUNTER (OUTPATIENT)
Dept: ONCOLOGY | Facility: HOSPITAL | Age: 70
Discharge: HOME | End: 2025-02-13
Admitting: NURSE PRACTITIONER
Payer: MEDICARE

## 2025-02-13 VITALS
SYSTOLIC BLOOD PRESSURE: 152 MMHG | OXYGEN SATURATION: 92 % | WEIGHT: 174 LBS | RESPIRATION RATE: 16 BRPM | BODY MASS INDEX: 32.02 KG/M2 | HEART RATE: 89 BPM | DIASTOLIC BLOOD PRESSURE: 66 MMHG | TEMPERATURE: 97.9 F | HEIGHT: 62 IN

## 2025-02-13 DIAGNOSIS — E80.6 HYPERBILIRUBINEMIA: ICD-10-CM

## 2025-02-13 DIAGNOSIS — C85.80 LYMPHOMA MALIGNANT, LARGE CELL: Primary | ICD-10-CM

## 2025-02-13 DIAGNOSIS — E80.6 HYPERBILIRUBINEMIA: Primary | ICD-10-CM

## 2025-02-13 DIAGNOSIS — Z45.2 ENCOUNTER FOR CARE RELATED TO VASCULAR ACCESS PORT: ICD-10-CM

## 2025-02-13 PROCEDURE — 96413 CHEMO IV INFUSION 1 HR: CPT

## 2025-02-13 PROCEDURE — 25010000002 HEPARIN LOCK FLUSH PER 10 UNITS: Performed by: STUDENT IN AN ORGANIZED HEALTH CARE EDUCATION/TRAINING PROGRAM

## 2025-02-13 PROCEDURE — 25810000003 SODIUM CHLORIDE 0.9 % SOLUTION 250 ML FLEX CONT: Performed by: NURSE PRACTITIONER

## 2025-02-13 PROCEDURE — 25010000002 ETOPOSIDE 1 GM/50ML SOLUTION 50 ML VIAL: Performed by: NURSE PRACTITIONER

## 2025-02-13 RX ORDER — HEPARIN SODIUM (PORCINE) LOCK FLUSH IV SOLN 100 UNIT/ML 100 UNIT/ML
500 SOLUTION INTRAVENOUS AS NEEDED
Status: DISCONTINUED | OUTPATIENT
Start: 2025-02-13 | End: 2025-02-14 | Stop reason: HOSPADM

## 2025-02-13 RX ORDER — HEPARIN SODIUM (PORCINE) LOCK FLUSH IV SOLN 100 UNIT/ML 100 UNIT/ML
500 SOLUTION INTRAVENOUS AS NEEDED
OUTPATIENT
Start: 2025-02-13

## 2025-02-13 RX ORDER — SODIUM CHLORIDE 0.9 % (FLUSH) 0.9 %
20 SYRINGE (ML) INJECTION AS NEEDED
OUTPATIENT
Start: 2025-02-13

## 2025-02-13 RX ORDER — SODIUM CHLORIDE 9 MG/ML
20 INJECTION, SOLUTION INTRAVENOUS ONCE
Status: DISCONTINUED | OUTPATIENT
Start: 2025-02-13 | End: 2025-02-14 | Stop reason: HOSPADM

## 2025-02-13 RX ORDER — SODIUM CHLORIDE 0.9 % (FLUSH) 0.9 %
20 SYRINGE (ML) INJECTION AS NEEDED
Status: DISCONTINUED | OUTPATIENT
Start: 2025-02-13 | End: 2025-02-14 | Stop reason: HOSPADM

## 2025-02-13 RX ADMIN — HEPARIN 500 UNITS: 100 SYRINGE at 12:00

## 2025-02-13 RX ADMIN — Medication 20 ML: at 12:00

## 2025-02-13 RX ADMIN — SODIUM CHLORIDE 90 MG: 0.9 INJECTION, SOLUTION INTRAVENOUS at 10:55

## 2025-02-13 NOTE — PROGRESS NOTES
Port accessed by Gloria Conde RN with sterile technique and positive blood return noted.  Blood drawn from port.  10 cc blood wasted prior to specimen collection.  Specimens sent to lab for processing. Patient feels well today. Chest xray from yesterday were normal. O2 sats 92%. Patient tolerated treatment. Discharged with AVS.

## 2025-02-14 ENCOUNTER — HOSPITAL ENCOUNTER (OUTPATIENT)
Dept: ONCOLOGY | Facility: HOSPITAL | Age: 70
Discharge: HOME | End: 2025-02-14
Payer: MEDICARE

## 2025-02-14 ENCOUNTER — HOSPITAL ENCOUNTER (OUTPATIENT)
Dept: INTERVENTIONAL RADIOLOGY/VASCULAR | Facility: HOSPITAL | Age: 70
Discharge: HOME | End: 2025-02-14
Payer: MEDICARE

## 2025-02-14 VITALS
SYSTOLIC BLOOD PRESSURE: 137 MMHG | DIASTOLIC BLOOD PRESSURE: 80 MMHG | BODY MASS INDEX: 32.02 KG/M2 | WEIGHT: 174 LBS | RESPIRATION RATE: 16 BRPM | TEMPERATURE: 98.2 F | OXYGEN SATURATION: 91 % | HEART RATE: 69 BPM | HEIGHT: 62 IN

## 2025-02-14 DIAGNOSIS — C85.80 LYMPHOMA MALIGNANT, LARGE CELL: Primary | ICD-10-CM

## 2025-02-14 LAB
APTT PPP: 20.3 SECONDS (ref 22.7–35.4)
BASOPHILS # BLD AUTO: 0.01 10*3/MM3 (ref 0–0.2)
BASOPHILS NFR BLD AUTO: 0.1 % (ref 0–1.5)
DACRYOCYTES BLD QL SMEAR: NORMAL
DEPRECATED RDW RBC AUTO: 70.4 FL (ref 37–54)
EOSINOPHIL # BLD AUTO: 0.03 10*3/MM3 (ref 0–0.4)
EOSINOPHIL NFR BLD AUTO: 0.3 % (ref 0.3–6.2)
ERYTHROCYTE [DISTWIDTH] IN BLOOD BY AUTOMATED COUNT: 20.2 % (ref 12.3–15.4)
HCT VFR BLD AUTO: 32.6 % (ref 34–46.6)
HGB BLD-MCNC: 10.1 G/DL (ref 12–15.9)
IMM GRANULOCYTES # BLD AUTO: 0.07 10*3/MM3 (ref 0–0.05)
IMM GRANULOCYTES NFR BLD AUTO: 0.7 % (ref 0–0.5)
INR PPP: 1.1 (ref 0.9–1.1)
LYMPHOCYTES # BLD AUTO: 0.42 10*3/MM3 (ref 0.7–3.1)
LYMPHOCYTES NFR BLD AUTO: 4.4 % (ref 19.6–45.3)
MCH RBC QN AUTO: 29.3 PG (ref 26.6–33)
MCHC RBC AUTO-ENTMCNC: 31 G/DL (ref 31.5–35.7)
MCV RBC AUTO: 94.5 FL (ref 79–97)
MONOCYTES # BLD AUTO: 0.14 10*3/MM3 (ref 0.1–0.9)
MONOCYTES NFR BLD AUTO: 1.5 % (ref 5–12)
NEUTROPHILS NFR BLD AUTO: 8.86 10*3/MM3 (ref 1.7–7)
NEUTROPHILS NFR BLD AUTO: 93 % (ref 42.7–76)
NRBC BLD AUTO-RTO: 0 /100 WBC (ref 0–0.2)
PLATELET # BLD AUTO: 177 10*3/MM3 (ref 140–450)
PMV BLD AUTO: 10.8 FL (ref 6–12)
PROTHROMBIN TIME: 14.1 SECONDS (ref 11.7–14.2)
RBC # BLD AUTO: 3.45 10*6/MM3 (ref 3.77–5.28)
SMALL PLATELETS BLD QL SMEAR: ADEQUATE
STOMATOCYTES BLD QL SMEAR: NORMAL
WBC MORPH BLD: NORMAL
WBC NRBC COR # BLD AUTO: 9.53 10*3/MM3 (ref 3.4–10.8)

## 2025-02-14 PROCEDURE — 25010000002 METHOTREXATE PF 100 MG/4ML SOLUTION 2 ML VIAL: Performed by: STUDENT IN AN ORGANIZED HEALTH CARE EDUCATION/TRAINING PROGRAM

## 2025-02-14 PROCEDURE — 85007 BL SMEAR W/DIFF WBC COUNT: CPT | Performed by: RADIOLOGY

## 2025-02-14 PROCEDURE — 96372 THER/PROPH/DIAG INJ SC/IM: CPT

## 2025-02-14 PROCEDURE — 85610 PROTHROMBIN TIME: CPT | Performed by: RADIOLOGY

## 2025-02-14 PROCEDURE — 25010000002 PEGFILGRASTIM-FPGK 6 MG/0.6ML SOLUTION PREFILLED SYRINGE: Performed by: NURSE PRACTITIONER

## 2025-02-14 PROCEDURE — 85025 COMPLETE CBC W/AUTO DIFF WBC: CPT | Performed by: RADIOLOGY

## 2025-02-14 PROCEDURE — 25010000002 HEPARIN LOCK FLUSH PER 10 UNITS

## 2025-02-14 PROCEDURE — 96450 CHEMOTHERAPY INTO CNS: CPT

## 2025-02-14 PROCEDURE — 85730 THROMBOPLASTIN TIME PARTIAL: CPT | Performed by: RADIOLOGY

## 2025-02-14 PROCEDURE — 77003 FLUOROGUIDE FOR SPINE INJECT: CPT

## 2025-02-14 RX ORDER — HEPARIN SODIUM (PORCINE) LOCK FLUSH IV SOLN 100 UNIT/ML 100 UNIT/ML
SOLUTION INTRAVENOUS
Status: COMPLETED
Start: 2025-02-14 | End: 2025-02-14

## 2025-02-14 RX ORDER — HEPARIN SODIUM (PORCINE) LOCK FLUSH IV SOLN 100 UNIT/ML 100 UNIT/ML
500 SOLUTION INTRAVENOUS AS NEEDED
Status: DISCONTINUED | OUTPATIENT
Start: 2025-02-14 | End: 2025-02-15 | Stop reason: HOSPADM

## 2025-02-14 RX ADMIN — Medication: at 14:05

## 2025-02-14 RX ADMIN — PEGFLILGRASTIM-FPGK 6 MG: 6 INJECTION, SOLUTION SUBCUTANEOUS at 14:33

## 2025-02-14 RX ADMIN — SODIUM CHLORIDE 12 MG: 9 INJECTION, SOLUTION INTRAMUSCULAR; INTRAVENOUS; SUBCUTANEOUS at 13:15

## 2025-02-14 NOTE — DISCHARGE INSTRUCTIONS
You may resume all your regular medications. Advance your diet as tolerated. Rest at home for the remainder of the day. You may remove your bandaid tomorrow then you can shower.  Do not drive for the remainder of the day. Do not lift more than 10 lbs for 48 hours.  If you experience severe pain not relieved with rest or medications, increased shortness of air or  a racing heartbeat seek immediate medical attention.  Follow up with Dr. Sethi as instructed.

## 2025-02-14 NOTE — PROGRESS NOTES
Pt to injection room for Stimufend. Injection given as ordered. WBC count from today's labs at [facility] 9.53. Pt tolerated injection well. Pt aware of next appt and discharged home.

## 2025-02-20 ENCOUNTER — HOSPITAL ENCOUNTER (OUTPATIENT)
Dept: ONCOLOGY | Facility: HOSPITAL | Age: 70
Discharge: HOME | End: 2025-02-20
Admitting: NURSE PRACTITIONER
Payer: MEDICARE

## 2025-02-20 ENCOUNTER — DOCUMENTATION (OUTPATIENT)
Dept: ONCOLOGY | Facility: CLINIC | Age: 70
End: 2025-02-20
Payer: MEDICARE

## 2025-02-20 DIAGNOSIS — E80.6 HYPERBILIRUBINEMIA: ICD-10-CM

## 2025-02-20 DIAGNOSIS — C85.80 LYMPHOMA MALIGNANT, LARGE CELL: ICD-10-CM

## 2025-02-20 DIAGNOSIS — Z45.2 ENCOUNTER FOR CARE RELATED TO VASCULAR ACCESS PORT: Primary | ICD-10-CM

## 2025-02-20 DIAGNOSIS — D64.9 SYMPTOMATIC ANEMIA: Primary | ICD-10-CM

## 2025-02-20 DIAGNOSIS — D64.9 ANEMIA, UNSPECIFIED TYPE: Primary | ICD-10-CM

## 2025-02-20 LAB
ALBUMIN SERPL-MCNC: 3.8 G/DL (ref 3.5–5.2)
ALBUMIN/GLOB SERPL: 2.7 G/DL
ALP SERPL-CCNC: 120 U/L (ref 39–117)
ALT SERPL W P-5'-P-CCNC: 26 U/L (ref 1–33)
ANION GAP SERPL CALCULATED.3IONS-SCNC: 11.2 MMOL/L (ref 5–15)
AST SERPL-CCNC: 17 U/L (ref 1–32)
BASOPHILS # BLD AUTO: 0.02 10*3/MM3 (ref 0–0.2)
BASOPHILS NFR BLD AUTO: 0.3 % (ref 0–1.5)
BILIRUB SERPL-MCNC: 2.8 MG/DL (ref 0–1.2)
BUN SERPL-MCNC: 18 MG/DL (ref 8–23)
BUN/CREAT SERPL: 16.1 (ref 7–25)
CALCIUM SPEC-SCNC: 9.2 MG/DL (ref 8.6–10.5)
CHLORIDE SERPL-SCNC: 100 MMOL/L (ref 98–107)
CO2 SERPL-SCNC: 23.8 MMOL/L (ref 22–29)
CREAT SERPL-MCNC: 1.12 MG/DL (ref 0.57–1)
DEPRECATED RDW RBC AUTO: 81.6 FL (ref 37–54)
EGFRCR SERPLBLD CKD-EPI 2021: 53 ML/MIN/1.73
EOSINOPHIL # BLD AUTO: 0.02 10*3/MM3 (ref 0–0.4)
EOSINOPHIL NFR BLD AUTO: 0.3 % (ref 0.3–6.2)
ERYTHROCYTE [DISTWIDTH] IN BLOOD BY AUTOMATED COUNT: 25.3 % (ref 12.3–15.4)
GLOBULIN UR ELPH-MCNC: 1.4 GM/DL
GLUCOSE SERPL-MCNC: 113 MG/DL (ref 65–99)
HCT VFR BLD AUTO: 25.4 % (ref 34–46.6)
HGB BLD-MCNC: 7.3 G/DL (ref 12–15.9)
LYMPHOCYTES # BLD AUTO: 2.72 10*3/MM3 (ref 0.7–3.1)
LYMPHOCYTES NFR BLD AUTO: 35 % (ref 19.6–45.3)
MCH RBC QN AUTO: 30.8 PG (ref 26.6–33)
MCHC RBC AUTO-ENTMCNC: 28.7 G/DL (ref 31.5–35.7)
MCV RBC AUTO: 107.2 FL (ref 79–97)
MONOCYTES # BLD AUTO: 0.69 10*3/MM3 (ref 0.1–0.9)
MONOCYTES NFR BLD AUTO: 8.9 % (ref 5–12)
NEUTROPHILS NFR BLD AUTO: 4.33 10*3/MM3 (ref 1.7–7)
NEUTROPHILS NFR BLD AUTO: 55.5 % (ref 42.7–76)
PLATELET # BLD AUTO: 151 10*3/MM3 (ref 140–450)
PMV BLD AUTO: 9.5 FL (ref 6–12)
POTASSIUM SERPL-SCNC: 3.4 MMOL/L (ref 3.5–5.2)
PROT SERPL-MCNC: 5.2 G/DL (ref 6–8.5)
RBC # BLD AUTO: 2.37 10*6/MM3 (ref 3.77–5.28)
SODIUM SERPL-SCNC: 135 MMOL/L (ref 136–145)
WBC NRBC COR # BLD AUTO: 7.78 10*3/MM3 (ref 3.4–10.8)

## 2025-02-20 PROCEDURE — 85025 COMPLETE CBC W/AUTO DIFF WBC: CPT | Performed by: NURSE PRACTITIONER

## 2025-02-20 PROCEDURE — 96523 IRRIG DRUG DELIVERY DEVICE: CPT

## 2025-02-20 PROCEDURE — 36415 COLL VENOUS BLD VENIPUNCTURE: CPT

## 2025-02-20 PROCEDURE — 25010000002 HEPARIN LOCK FLUSH PER 10 UNITS: Performed by: STUDENT IN AN ORGANIZED HEALTH CARE EDUCATION/TRAINING PROGRAM

## 2025-02-20 PROCEDURE — G0463 HOSPITAL OUTPT CLINIC VISIT: HCPCS

## 2025-02-20 PROCEDURE — 80053 COMPREHEN METABOLIC PANEL: CPT | Performed by: NURSE PRACTITIONER

## 2025-02-20 RX ORDER — SODIUM CHLORIDE 0.9 % (FLUSH) 0.9 %
20 SYRINGE (ML) INJECTION AS NEEDED
Status: CANCELLED | OUTPATIENT
Start: 2025-02-20

## 2025-02-20 RX ORDER — SODIUM CHLORIDE 9 MG/ML
250 INJECTION, SOLUTION INTRAVENOUS ONCE
Status: CANCELLED | OUTPATIENT
Start: 2025-02-20

## 2025-02-20 RX ORDER — SODIUM CHLORIDE 0.9 % (FLUSH) 0.9 %
20 SYRINGE (ML) INJECTION AS NEEDED
Status: DISCONTINUED | OUTPATIENT
Start: 2025-02-20 | End: 2025-02-21 | Stop reason: HOSPADM

## 2025-02-20 RX ORDER — HEPARIN SODIUM (PORCINE) LOCK FLUSH IV SOLN 100 UNIT/ML 100 UNIT/ML
500 SOLUTION INTRAVENOUS AS NEEDED
Status: CANCELLED | OUTPATIENT
Start: 2025-02-20

## 2025-02-20 RX ORDER — HEPARIN SODIUM (PORCINE) LOCK FLUSH IV SOLN 100 UNIT/ML 100 UNIT/ML
500 SOLUTION INTRAVENOUS AS NEEDED
Status: DISCONTINUED | OUTPATIENT
Start: 2025-02-20 | End: 2025-02-21 | Stop reason: HOSPADM

## 2025-02-20 RX ADMIN — Medication 20 ML: at 15:54

## 2025-02-20 RX ADMIN — HEPARIN 500 UNITS: 100 SYRINGE at 15:54

## 2025-02-20 NOTE — PROGRESS NOTES
Received critical hgb of 7.3 called patient she is symptomatic with extreme fatigue. Discussed with NITIN Jesus patient will be scheduled for 1 unit PRBC

## 2025-02-20 NOTE — PROGRESS NOTES
Port accessed for blood collection. Port aspirated, positive blood return noted. Not enough blood to collect labs, so port flushed and pt's labs collected via venipuncture today. Port flushed with 10 ml NS and heparin 500 units, then de-accessed. Pt tolerated well.

## 2025-02-21 ENCOUNTER — HOSPITAL ENCOUNTER (OUTPATIENT)
Dept: INFUSION THERAPY | Facility: HOSPITAL | Age: 70
Discharge: HOME | End: 2025-02-21
Payer: MEDICARE

## 2025-02-21 VITALS
TEMPERATURE: 98.5 F | DIASTOLIC BLOOD PRESSURE: 84 MMHG | OXYGEN SATURATION: 93 % | RESPIRATION RATE: 16 BRPM | HEART RATE: 67 BPM | SYSTOLIC BLOOD PRESSURE: 154 MMHG

## 2025-02-21 DIAGNOSIS — D64.9 ANEMIA, UNSPECIFIED TYPE: ICD-10-CM

## 2025-02-21 DIAGNOSIS — D64.9 SYMPTOMATIC ANEMIA: ICD-10-CM

## 2025-02-21 DIAGNOSIS — Z45.2 ENCOUNTER FOR CARE RELATED TO VASCULAR ACCESS PORT: Primary | ICD-10-CM

## 2025-02-21 DIAGNOSIS — C83.31 DIFFUSE LARGE B-CELL LYMPHOMA, LYMPH NODES OF HEAD, FACE, AND NECK: ICD-10-CM

## 2025-02-21 LAB
ABO GROUP BLD: NORMAL
ANISOCYTOSIS BLD QL: ABNORMAL
BB HOLD TUBE: NORMAL
BLD GP AB SCN SERPL QL: NEGATIVE
DEPRECATED RDW RBC AUTO: 102.7 FL (ref 37–54)
DIMORPHIC RBC: PRESENT
ERYTHROCYTE [DISTWIDTH] IN BLOOD BY AUTOMATED COUNT: 27.5 % (ref 12.3–15.4)
HCT VFR BLD AUTO: 24.3 % (ref 34–46.6)
HGB BLD-MCNC: 7.2 G/DL (ref 12–15.9)
LYMPHOCYTES # BLD MANUAL: 0.6 10*3/MM3 (ref 0.7–3.1)
LYMPHOCYTES NFR BLD MANUAL: 8 % (ref 5–12)
MCH RBC QN AUTO: 31.6 PG (ref 26.6–33)
MCHC RBC AUTO-ENTMCNC: 29.6 G/DL (ref 31.5–35.7)
MCV RBC AUTO: 106.6 FL (ref 79–97)
METAMYELOCYTES NFR BLD MANUAL: 6 % (ref 0–0)
MONOCYTES # BLD: 0.43 10*3/MM3 (ref 0.1–0.9)
MYELOCYTES NFR BLD MANUAL: 3 % (ref 0–0)
NEUTROPHILS # BLD AUTO: 3.9 10*3/MM3 (ref 1.7–7)
NEUTROPHILS NFR BLD MANUAL: 58 % (ref 42.7–76)
NEUTS BAND NFR BLD MANUAL: 14 % (ref 0–5)
NRBC SPEC MANUAL: 9 /100 WBC (ref 0–0.2)
PLAT MORPH BLD: NORMAL
PLATELET # BLD AUTO: 145 10*3/MM3 (ref 140–450)
PMV BLD AUTO: 10.3 FL (ref 6–12)
POIKILOCYTOSIS BLD QL SMEAR: ABNORMAL
POLYCHROMASIA BLD QL SMEAR: ABNORMAL
RBC # BLD AUTO: 2.28 10*6/MM3 (ref 3.77–5.28)
RH BLD: POSITIVE
T&S EXPIRATION DATE: NORMAL
VARIANT LYMPHS NFR BLD MANUAL: 5 % (ref 0–5)
VARIANT LYMPHS NFR BLD MANUAL: 6 % (ref 19.6–45.3)
WBC MORPH BLD: NORMAL
WBC NRBC COR # BLD AUTO: 5.42 10*3/MM3 (ref 3.4–10.8)

## 2025-02-21 PROCEDURE — 86900 BLOOD TYPING SEROLOGIC ABO: CPT

## 2025-02-21 PROCEDURE — 86923 COMPATIBILITY TEST ELECTRIC: CPT

## 2025-02-21 PROCEDURE — 25010000002 ALTEPLASE 2 MG RECONSTITUTED SOLUTION: Performed by: NURSE PRACTITIONER

## 2025-02-21 PROCEDURE — 36430 TRANSFUSION BLD/BLD COMPNT: CPT

## 2025-02-21 PROCEDURE — 85007 BL SMEAR W/DIFF WBC COUNT: CPT | Performed by: NURSE PRACTITIONER

## 2025-02-21 PROCEDURE — 86901 BLOOD TYPING SEROLOGIC RH(D): CPT

## 2025-02-21 PROCEDURE — 36593 DECLOT VASCULAR DEVICE: CPT

## 2025-02-21 PROCEDURE — 25010000002 HEPARIN LOCK FLUSH PER 10 UNITS: Performed by: STUDENT IN AN ORGANIZED HEALTH CARE EDUCATION/TRAINING PROGRAM

## 2025-02-21 PROCEDURE — P9016 RBC LEUKOCYTES REDUCED: HCPCS

## 2025-02-21 PROCEDURE — 86850 RBC ANTIBODY SCREEN: CPT | Performed by: NURSE PRACTITIONER

## 2025-02-21 PROCEDURE — 85025 COMPLETE CBC W/AUTO DIFF WBC: CPT | Performed by: NURSE PRACTITIONER

## 2025-02-21 PROCEDURE — 86900 BLOOD TYPING SEROLOGIC ABO: CPT | Performed by: NURSE PRACTITIONER

## 2025-02-21 PROCEDURE — 86901 BLOOD TYPING SEROLOGIC RH(D): CPT | Performed by: NURSE PRACTITIONER

## 2025-02-21 RX ORDER — HEPARIN SODIUM (PORCINE) LOCK FLUSH IV SOLN 100 UNIT/ML 100 UNIT/ML
500 SOLUTION INTRAVENOUS AS NEEDED
Status: DISCONTINUED | OUTPATIENT
Start: 2025-02-21 | End: 2025-02-23 | Stop reason: HOSPADM

## 2025-02-21 RX ORDER — HEPARIN SODIUM (PORCINE) LOCK FLUSH IV SOLN 100 UNIT/ML 100 UNIT/ML
500 SOLUTION INTRAVENOUS AS NEEDED
Status: CANCELLED | OUTPATIENT
Start: 2025-02-21

## 2025-02-21 RX ORDER — SODIUM CHLORIDE 0.9 % (FLUSH) 0.9 %
20 SYRINGE (ML) INJECTION AS NEEDED
Status: CANCELLED | OUTPATIENT
Start: 2025-02-21

## 2025-02-21 RX ORDER — SODIUM CHLORIDE 0.9 % (FLUSH) 0.9 %
20 SYRINGE (ML) INJECTION AS NEEDED
Status: DISCONTINUED | OUTPATIENT
Start: 2025-02-21 | End: 2025-02-23 | Stop reason: HOSPADM

## 2025-02-21 RX ORDER — WATER 10 ML/10ML
INJECTION INTRAMUSCULAR; INTRAVENOUS; SUBCUTANEOUS
Status: COMPLETED
Start: 2025-02-21 | End: 2025-02-21

## 2025-02-21 RX ORDER — SODIUM CHLORIDE 9 MG/ML
250 INJECTION, SOLUTION INTRAVENOUS ONCE
Status: DISCONTINUED | OUTPATIENT
Start: 2025-02-21 | End: 2025-02-23 | Stop reason: HOSPADM

## 2025-02-21 RX ADMIN — WATER: 1 INJECTION INTRAMUSCULAR; INTRAVENOUS; SUBCUTANEOUS at 11:14

## 2025-02-21 RX ADMIN — ALTEPLASE: 2.2 INJECTION, POWDER, LYOPHILIZED, FOR SOLUTION INTRAVENOUS at 11:14

## 2025-02-21 RX ADMIN — Medication 20 ML: at 14:53

## 2025-02-21 RX ADMIN — HEPARIN 500 UNITS: 100 SYRINGE at 14:54

## 2025-02-21 NOTE — PROGRESS NOTES
Labs drawn via venipuncture.  Port would not bleed back.  Will use cathflo as ordered.  Blood will be given through PIV.  Cathflo worked in the first 30 minutes.  Positive blood flow.

## 2025-02-22 LAB
BH BB BLOOD EXPIRATION DATE: NORMAL
BH BB BLOOD TYPE BARCODE: 6200
BH BB DISPENSE STATUS: NORMAL
BH BB PRODUCT CODE: NORMAL
BH BB UNIT NUMBER: NORMAL
CROSSMATCH INTERPRETATION: NORMAL
UNIT  ABO: NORMAL
UNIT  RH: NORMAL

## 2025-02-25 ENCOUNTER — LAB (OUTPATIENT)
Dept: LAB | Facility: HOSPITAL | Age: 70
End: 2025-02-25
Payer: MEDICARE

## 2025-02-25 ENCOUNTER — HOSPITAL ENCOUNTER (OUTPATIENT)
Dept: ONCOLOGY | Facility: HOSPITAL | Age: 70
Discharge: HOME | End: 2025-02-25
Admitting: NURSE PRACTITIONER
Payer: MEDICARE

## 2025-02-25 DIAGNOSIS — C85.80 LYMPHOMA MALIGNANT, LARGE CELL: Primary | ICD-10-CM

## 2025-02-25 DIAGNOSIS — C85.80 LYMPHOMA MALIGNANT, LARGE CELL: ICD-10-CM

## 2025-02-25 LAB
ALBUMIN SERPL-MCNC: 4.1 G/DL (ref 3.5–5.2)
ALBUMIN/GLOB SERPL: 2.7 G/DL
ALP SERPL-CCNC: 126 U/L (ref 39–117)
ALT SERPL W P-5'-P-CCNC: 36 U/L (ref 1–33)
ANION GAP SERPL CALCULATED.3IONS-SCNC: 9.3 MMOL/L (ref 5–15)
AST SERPL-CCNC: 36 U/L (ref 1–32)
BASOPHILS # BLD AUTO: 0.05 10*3/MM3 (ref 0–0.2)
BASOPHILS NFR BLD AUTO: 0.1 % (ref 0–1.5)
BILIRUB SERPL-MCNC: 2.9 MG/DL (ref 0–1.2)
BUN SERPL-MCNC: 10 MG/DL (ref 8–23)
BUN/CREAT SERPL: 10.8 (ref 7–25)
CALCIUM SPEC-SCNC: 9 MG/DL (ref 8.6–10.5)
CHLORIDE SERPL-SCNC: 101 MMOL/L (ref 98–107)
CO2 SERPL-SCNC: 25.7 MMOL/L (ref 22–29)
CREAT SERPL-MCNC: 0.93 MG/DL (ref 0.57–1)
DEPRECATED RDW RBC AUTO: 93.5 FL (ref 37–54)
EGFRCR SERPLBLD CKD-EPI 2021: 66.3 ML/MIN/1.73
EOSINOPHIL # BLD AUTO: 0.06 10*3/MM3 (ref 0–0.4)
EOSINOPHIL NFR BLD AUTO: 0.1 % (ref 0.3–6.2)
ERYTHROCYTE [DISTWIDTH] IN BLOOD BY AUTOMATED COUNT: 25.3 % (ref 12.3–15.4)
GLOBULIN UR ELPH-MCNC: 1.5 GM/DL
GLUCOSE SERPL-MCNC: 199 MG/DL (ref 65–99)
HCT VFR BLD AUTO: 35.3 % (ref 34–46.6)
HGB BLD-MCNC: 10.6 G/DL (ref 12–15.9)
LYMPHOCYTES # BLD AUTO: 5.79 10*3/MM3 (ref 0.7–3.1)
LYMPHOCYTES NFR BLD AUTO: 10.3 % (ref 19.6–45.3)
MAGNESIUM SERPL-MCNC: 2.3 MG/DL (ref 1.6–2.4)
MCH RBC QN AUTO: 32.8 PG (ref 26.6–33)
MCHC RBC AUTO-ENTMCNC: 30 G/DL (ref 31.5–35.7)
MCV RBC AUTO: 109.3 FL (ref 79–97)
MONOCYTES NFR BLD AUTO: ABNORMAL %
NEUTROPHILS NFR BLD AUTO: ABNORMAL %
PHOSPHATE SERPL-MCNC: 3.5 MG/DL (ref 2.5–4.5)
PLATELET # BLD AUTO: 156 10*3/MM3 (ref 140–450)
PMV BLD AUTO: 9.7 FL (ref 6–12)
POTASSIUM SERPL-SCNC: 4.8 MMOL/L (ref 3.5–5.2)
PROT SERPL-MCNC: 5.6 G/DL (ref 6–8.5)
RBC # BLD AUTO: 3.23 10*6/MM3 (ref 3.77–5.28)
SODIUM SERPL-SCNC: 136 MMOL/L (ref 136–145)
WBC NRBC COR # BLD AUTO: 56.27 10*3/MM3 (ref 3.4–10.8)

## 2025-02-25 PROCEDURE — 80053 COMPREHEN METABOLIC PANEL: CPT | Performed by: NURSE PRACTITIONER

## 2025-02-25 PROCEDURE — G0463 HOSPITAL OUTPT CLINIC VISIT: HCPCS

## 2025-02-25 PROCEDURE — 84100 ASSAY OF PHOSPHORUS: CPT | Performed by: NURSE PRACTITIONER

## 2025-02-25 PROCEDURE — 85025 COMPLETE CBC W/AUTO DIFF WBC: CPT | Performed by: NURSE PRACTITIONER

## 2025-02-25 PROCEDURE — 83735 ASSAY OF MAGNESIUM: CPT | Performed by: NURSE PRACTITIONER

## 2025-02-25 NOTE — PROGRESS NOTES
HEMATOLOGY ONCOLOGY OUTPATIENT FOLLOW UP       Patient name: Catarina Mclean  : 1955  MRN: [id number]  Primary Care Physician: Bozena Alamo NP  Referring Physician: No ref. provider found  Reason For Consult:       History of Present Illness:  Patient is a 69-year-old female who has been referred to us for further evaluation and management of diffuse lymphadenopathy.    She reported having symptoms of worsening fatigue, poor appetite intermittent night sweats and palpable axillary adenopathy approximately 2 months ago.  She was seen by her PCP for the symptoms and was empirically treated with oral antibiotics with limited improvement in her symptoms.      Bilateral breast screening mammogram in 2024 was reported unremarkable.  [BI-RADS 1]    Subsequently a CT chest abdomen pelvis were ordered and is reported as follows:  2024: CT chest/abdomen/pelvis:  Impression:  1.Supra diaphragmatic and infra diaphragmatic lymphadenopathy concerning for lymphoma.     Her past medical history significant for non-Hodgkin lymphoma [likely DLBCL], diagnosed in -10  She reported that she was being followed by Dr. Barkley and had systemic therapy for lymphoma in [id number].  She did not follow-up with oncologist thereafter and was lost to follow-up.  Patient is unable to provide any additional details about her diagnosis or treatment.    Limited prior medical records from  were reviewed:    Patient underwent bilateral tonsillectomy in 2009,  Pathology: Left tonsil was reported to have involvement from malignant lymphoma, large cell type of B-cell origin.  [Of activated B-cell phenotype].  IHC staining was reported positive for CD45, CD20, Bcl-2 and MUM1 with Ki-67 98%.  The tumor was negative for BEV.    A bone marrow biopsy was reported negative for lymphoma involvement at that time.    Patient stated that she is up-to-date whether age-appropriate cancer screening  including annual screening mammograms, Pap smears and colonoscopies.  Last colonoscopy was approximately 10 years ago and was reported normal per patient.    Family history significant for unknown cancer in brother who has been recently diagnosed..      Patient presents for initial consultation today.  She reported having symptoms of generalized fatigue, poor appetite and palpable axillary adenopathy in right axilla and right supraclavicular area for last few weeks.  Denied any other respiratory or GI/ symptoms.  She reported having occasional night sweats but denied any weight loss or fever/chills.  No recent infections reported.    11/20/24: Lymph node, right axillary:  Large B-cell lymphoma  Immunohistochemistry  There is predominance of intermediate to large sized CD45 and CD20+ B-lymphocytes. CD3 and CD5 reveal numerous small T-lymphocytes. B-cells are negative for CD5, CD10, CD15, CD30, and BEV. They are positive for PAX-5, BCL-2, BCL-6, and MUM-1, as well as c-MYC (~40% of neoplastic cells). Ki-67 shows high proliferative rate (50-60%).    Flow Cytometry: Abnormal/ monotypic B-cell population (4% of sample)  Comment: Scatter properties compatible with increased cell size, cells characterized as:  CD45+, CD19+, CD20+, CD5-, CD10-, CD23-, FMC7-, CD30-, CD38-, CD43-/+, HLA  DR+, sIg lambda+    FISH PANEL: Abnormal Lymphoma FISH panel  Aneuploidy: gain of BCL6/3q, MYC/8q, and BCL2/18q  Additional IGH/14q    12/3/24: PET/CT:  Impression:  1. Hypermetabolic left cervical chain, bilateral supraclavicular, right axillary, right subpectoral, portacaval and retroperitoneal adenopathy consistent with known lymphoma.  2. Two small hypermetabolic splenic lesions likely also related to lymphoma. No splenomegaly.  3. Hypermetabolic osseous uptake associated with C3 vertebral body and right posterior 12th rib likely osseous involvement of lymphoma.  4. Additional incidental CT findings above.    12/10/24: Patient seen  today for follow-up visit to discuss her biopsy and imaging results.  She reported again having ongoing fatigue and decreased appetite.  Denied any new complaints today.    12/16/2024: Transthoracic echocardiogram:    Left ventricular ejection fraction appears to be 61 - 65%.    Left ventricular diastolic function is consistent with (grade Ia w/high LAP) impaired relaxation.    The left atrial cavity is dilated.    Estimated right ventricular systolic pressure from tricuspid regurgitation is normal (<35 mmHg).    No significant valvular abnormalities noted.    Extracardiac findings: Periportal lymph nodes are noted.      12/23/2024: Patient seen today for follow-up.  Has petechial rash on her chest and extremities which is new.  Has some ongoing fatigue but otherwise stable    12/25/24- 12/2724:   The patient was admitted to [facility] hospital with complaint of rash which began on face and spread to the chest. She was also noted to have thrombocytopenia with Plt count jacqueline at 58K. She was started on steroids and Atarax with improvement of rash and plt counts. The patient was discharged home on Medrol dose pack as well as Benadryl as needed in view of the rash.     1/10/25: Patient seen today for follow-up visit.  She reported clinically doing better, reported improved appetite and good energy levels today.  She was on tapering steroids since her hospitalization which she has completed approximately 4-5 days ago.  No new complaints reported.  Stated that her palpable supraclavicular and right axillary adenopathy has improved.  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    1/21/25: Patient seen today for follow-up visit.  Clinically doing well this morning, reportedly she had a severe allergic reaction to Bactrim recently for which she was seen at [facility] ER on 1/14/2025 after  she accidentally took Bactrim although this was discontinued previously.  She has recovered from her acute symptoms and is near her baseline today.  Reported good energy levels and appetite    1/21/25: C1D1 R-CEOP with IT Methotrexate    2/11/2025: Marky is here today for her routine follow-up and evaluation for readiness for cycle 2 of treatment.  She reports that she has been doing very well.  Denies any side effects of treatment outside of hair loss.  Reports that she was seen at [facility] for evaluation for transplant but that due to her excellent response to treatment it is not recommended at this time.  She reports that she is no longer able to feel the right supraclavicular or right axillary lymph node.    2/11/25: C2D1 R-CEOP with IT Methotrexate      Subjective:  3/4/25: Seen today for follow-up prior to cycle 3 chemotherapy.  Appears to have good tolerance so far except for progressive fatigue.  No other side effects reported.  Weight/appetite is stable.  Her supraclavicular and axillary lymphadenopathy has significantly improved.  Compliant with antiviral and PCP prophylaxis.  No signs/symptoms concerning for infection.    Past Medical History:   Diagnosis Date    Drug therapy     Hyperlipidemia     Hypertension     Non Hodgkin's lymphoma 2009       Past Surgical History:   Procedure Laterality Date    BREAST BIOPSY      HYSTERECTOMY           Current Outpatient Medications:     acyclovir (ZOVIRAX) 400 MG tablet, Take 1 tablet by mouth 2 (Two) Times a Day. Take no more than 5 doses a day., Disp: 60 tablet, Rfl: 5    allopurinol (ZYLOPRIM) 300 MG tablet, TAKE 1 TABLET BY MOUTH DAILY, Disp: 30 tablet, Rfl: 0    amLODIPine (NORVASC) 5 MG tablet, Take 1 tablet by mouth Daily., Disp: , Rfl:     aspirin 81 MG chewable tablet, Chew Daily., Disp: , Rfl:     carvedilol (COREG) 6.25 MG tablet, Take 1 tablet by mouth 2 (Two) Times a Day., Disp: 180 tablet, Rfl: 3    dapsone 100 MG tablet, Take 1 tablet by mouth  Daily., Disp: 30 tablet, Rfl: 1    fluconazole (DIFLUCAN) 150 MG tablet, , Disp: , Rfl:     lidocaine-prilocaine (EMLA) 2.5-2.5 % cream, Apply 1 Application topically to the appropriate area as directed As Needed (apply 1 hour prior to port access)., Disp: 30 g, Rfl: 2    lisinopril (PRINIVIL,ZESTRIL) 20 MG tablet, Take 1 tablet by mouth Daily., Disp: , Rfl:     Omega-3 Fatty Acids (fish oil) 1000 MG capsule capsule, Take 2 capsules by mouth Daily With Breakfast., Disp: , Rfl:     ondansetron (ZOFRAN) 8 MG tablet, Take 1 tablet by mouth 3 (Three) Times a Day As Needed for Nausea or Vomiting., Disp: 30 tablet, Rfl: 5    potassium chloride (KLOR-CON M20) 20 MEQ CR tablet, Take 2 tablets by mouth 2 (Two) Times a Day., Disp: 2 tablet, Rfl: 0    pravastatin (PRAVACHOL) 20 MG tablet, Take 1 tablet by mouth Daily., Disp: , Rfl:     predniSONE (DELTASONE) 50 MG tablet, Take 2 tablets by mouth Daily. Take with food.  Bring the first dose to chemotherapy appointment., Disp: 10 tablet, Rfl: 5    traZODone (DESYREL) 50 MG tablet, , Disp: , Rfl:     Allergies   Allergen Reactions    Sulfamethoxazole-Trimethoprim Swelling       Family History   Problem Relation Age of Onset    Liver cancer Brother     Breast cancer Maternal Aunt        Cancer-related family history includes Breast cancer in her maternal aunt; Liver cancer in her brother.      Social History     Tobacco Use    Smoking status: Never     Passive exposure: Never    Smokeless tobacco: Never   Vaping Use    Vaping status: Never Used   Substance Use Topics    Alcohol use: Yes     Alcohol/week: 10.0 standard drinks of alcohol     Types: 10 Cans of beer per week     Comment: WEEKLY    Drug use: Never     Social History     Social History Narrative    Not on file       ROS:   Review of Systems   Constitutional:  Positive for fatigue.   HENT: Negative.     Eyes: Negative.    Respiratory: Negative.     Cardiovascular: Negative.    Gastrointestinal: Negative.    Endocrine:  "Negative.    Genitourinary: Negative.    Musculoskeletal: Negative.    Skin: Negative.    Allergic/Immunologic: Negative.    Neurological: Negative.    Hematological: Negative.    Psychiatric/Behavioral: Negative.           Objective:    Vital Signs:  Vitals:    03/04/25 0758   BP: 124/70   Pulse: 92   SpO2: 92%   Weight: 76.9 kg (169 lb 8 oz)   Height: 157.5 cm (62\")   PainSc: 0-No pain             Body mass index is 31 kg/m².    ECOG  (1) Restricted in physically strenuous activity, ambulatory and able to do work of light nature    Physical Exam:   Physical Exam  Constitutional:       General: She is not in acute distress.     Appearance: Normal appearance. She is normal weight.   HENT:      Head: Normocephalic and atraumatic.      Right Ear: External ear normal.      Left Ear: External ear normal.      Nose: Nose normal.      Mouth/Throat:      Mouth: Mucous membranes are moist.      Pharynx: Oropharynx is clear.   Eyes:      Extraocular Movements: Extraocular movements intact.      Conjunctiva/sclera: Conjunctivae normal.      Pupils: Pupils are equal, round, and reactive to light.   Cardiovascular:      Rate and Rhythm: Normal rate.      Pulses: Normal pulses.   Pulmonary:      Effort: Pulmonary effort is normal. No respiratory distress.   Abdominal:      General: Abdomen is flat.      Palpations: Abdomen is soft.   Musculoskeletal:         General: Normal range of motion.      Cervical back: Normal range of motion and neck supple.   Skin:     General: Skin is warm.   Neurological:      General: No focal deficit present.      Mental Status: She is alert and oriented to person, place, and time.   Psychiatric:         Mood and Affect: Mood normal.         Behavior: Behavior normal.         Thought Content: Thought content normal.         Judgment: Judgment normal.         Lab Results - Last 18 Months   Lab Units 02/21/25  1026 02/20/25  1519 02/14/25  1154   WBC 10*3/mm3 5.42 7.78 9.53   HEMOGLOBIN g/dL 7.2* 7.3* " "10.1*   HEMATOCRIT % 24.3* 25.4* 32.6*   PLATELETS 10*3/mm3 145 151 177   MCV fL 106.6* 107.2* 94.5     Lab Results - Last 18 Months   Lab Units 03/03/25  1000 02/25/25  1556 02/20/25  1519   SODIUM mmol/L 138 136 135*   POTASSIUM mmol/L 3.7 4.8 3.4*   CHLORIDE mmol/L 103 101 100   CO2 mmol/L 23.3 25.7 23.8   BUN mg/dL 7* 10 18   CREATININE mg/dL 0.85 0.93 1.12*   CALCIUM mg/dL 9.0 9.0 9.2   BILIRUBIN mg/dL 2.5* 2.9* 2.8*   ALK PHOS U/L 96 126* 120*   ALT (SGPT) U/L 47* 36* 26   AST (SGOT) U/L 40* 36* 17   GLUCOSE mg/dL 147* 199* 113*       Lab Results   Component Value Date    GLUCOSE 147 (H) 03/03/2025    BUN 7 (L) 03/03/2025    CREATININE 0.85 03/03/2025    BCR 8.2 03/03/2025    K 3.7 03/03/2025    CO2 23.3 03/03/2025    CALCIUM 9.0 03/03/2025    ALBUMIN 4.0 03/03/2025    AST 40 (H) 03/03/2025    ALT 47 (H) 03/03/2025       Lab Results - Last 18 Months   Lab Units 02/14/25  1141 02/03/25  1037 01/24/25  1122 01/16/25  1106   INR  1.10 1.13* 1.11* 1.01   APTT seconds 20.3*  --  32.3 30.2       No results found for: \"IRON\", \"TIBC\", \"FERRITIN\"    No results found for: \"FOLATE\"    No results found for: \"OCCULTBLD\"    No results found for: \"RETICCTPCT\"  No results found for: \"WEQBSHSD42\"  No results found for: \"SPEP\", \"UPEP\"  LDH   Date Value Ref Range Status   02/03/2025 483 (H) 135 - 214 U/L Final     Uric Acid   Date Value Ref Range Status   02/11/2025 2.1 (L) 2.4 - 5.7 mg/dL Final     Lab Results   Component Value Date    OTTONIEL Positive (A) 11/20/2024    SEDRATE 4 12/10/2024     No results found for: \"FIBRINOGEN\", \"HAPTOGLOBIN\"  Lab Results   Component Value Date    PTT 20.3 (L) 02/14/2025    INR 1.10 02/14/2025     No results found for: \"\"  No results found for: \"CEA\"  No components found for: \"CA-19-9\"  No results found for: \"PSA\"  Lab Results   Component Value Date    SEDRATE 4 12/10/2024          Assessment & Plan     Generalized lymphadenopathy:  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  " Patient has remote history of aggressive NHL, status post chemotherapy in 2009-10.  -CT chest/abdomen/pelvis reviewed, noted to have extensive lymphadenopathy involving supraclavicular, axillary and intra-abdominal lymph nodes.  No solid organ masses noted concerning for solid organ metastasis.  -Axillary lymph node biopsy and PET/CT findings reviewed as above.  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  Ki-67 is elevated at 50-60%  -I reviewed these findings with patient in detail.  Given extensive disease and aggressive disease biology, she was initially being considered for dose adjusted R-EPOCH regimen, I also discussed her case with [facility] BMT attending Dr. Zuleta. Due to concern about prior exposure to anthracyclines during at time of her initial diagnosis and treatment in 2009-10, repeat treatment with anthracycline based regimen may carry risk of significant cardiotoxicity.  She has been since evaluated at [facility] BMT clinic by Dr. Hirsch who recommended to start patient on R-CEOP regimen for total 6 cycles replacing Doxorubicin with Etoposide.  -This plan was discussed in detail with patient today.  Potential treatment related adverse effects were explained to her.  She verbalized understanding and is agreeable to treatment.   -She has completed 2 cycles of R-CEOP with good tolerance so far, however has somewhat progressive fatigue.  -Labs reviewed, noted to have elevated serum bilirubin levels, we will dose reduce vincristine by 50%, otherwise okay to proceed with treatment  -Plan for interval PET/CT prior to cycle 4 chemotherapy to assess response.    CNS prophylaxis:    The patient will need CNS prophylaxis  with IT methotrexate with systemic therapy given high risk for CNS involvement [CNS IPI 4,  associated with high risk of CNS involvement].  -patient is scheduled for IT Chemotherapy with methotrexate with each cycle.      Cardiac health: Detailed medical records pertaining to her prior cancer treatment are not available, however it seems she had outpatient chemotherapy with R-CHOP or similar regimen containing anthracyclines..  Will try to obtain these records but there is significant concern about patient crossing the maximum recommended lifetime dose for anthracyclines with her planned lymphoma treatment.  -Discussed her case with cardiology [Dr. García].  She was referred to cardiology clinic to discuss cardiac risk reduction and to start prophylactic treatment.   -Initial echocardiogram reported normal with EF 61-65%  She has been started on Coreg and Jardiance.   -Patient has been started on an anthracycline free regimen as above      TLS prophylaxis: Started patient on allopurinol due to bulky disease and risk of TLS.  Will continue throughout treatment.  Reviewed with the patient.  Continue to monitor biweekly labs    ID: Started patient on antiviral and PCP prophylaxis with acyclovir and Bactrim. Bactrim has been held due to concern about drug induced rash and thrombocytopenia.  Will start patient on Daily dapsone [atovaquone not covered by insurance].  Reviewed with the patient.    Cytopenias: Monitor biweekly CBC and CMP and transfuse PRN to maintain Hb >7.5 and Plt count >10K.    Skin Rash:   Thrombocytopenia:  This has improved. Patient is s/p completion of tapering steroids.   Persistent thrombocytopenia could be secondary to accidental use of bactrim recently.  Resolved      Bactrim allergy : Patient has had severe symptoms following accidentally taking Bactrim recently requiring her to be seen in ER, she was treated with IV Solu-Medrol, Benadryl and Pepcid.  Continue to hold off on Bactrim moving forward.  Patient was started on Dapsone.  G6PD levels were reported normal    Leukocytosis:  Secondary to G-CSF and steroids.  Continue to monitor    3-week follow-up with scheduled treatment and PET/CT, sooner as needed    Thank you very much for providing the opportunity to participate in this patient’s care. Please do not hesitate to call if there are any other questions.

## 2025-02-26 NOTE — PROGRESS NOTES
Attempted to draw labs from port. There was blood return but could not get enough for adequate blood specimen. Pt to lab for venipuncture to obtain labs.

## 2025-02-28 DIAGNOSIS — C85.80 LYMPHOMA MALIGNANT, LARGE CELL: Primary | ICD-10-CM

## 2025-03-03 ENCOUNTER — HOSPITAL ENCOUNTER (OUTPATIENT)
Dept: ONCOLOGY | Facility: HOSPITAL | Age: 70
Discharge: HOME | End: 2025-03-03
Admitting: STUDENT IN AN ORGANIZED HEALTH CARE EDUCATION/TRAINING PROGRAM
Payer: MEDICARE

## 2025-03-03 DIAGNOSIS — C83.31 DIFFUSE LARGE B-CELL LYMPHOMA, LYMPH NODES OF HEAD, FACE, AND NECK: ICD-10-CM

## 2025-03-03 DIAGNOSIS — Z45.2 ENCOUNTER FOR CARE RELATED TO VASCULAR ACCESS PORT: Primary | ICD-10-CM

## 2025-03-03 LAB
ALBUMIN SERPL-MCNC: 4 G/DL (ref 3.5–5.2)
ALBUMIN/GLOB SERPL: 2.5 G/DL
ALP SERPL-CCNC: 96 U/L (ref 39–117)
ALT SERPL W P-5'-P-CCNC: 47 U/L (ref 1–33)
ANION GAP SERPL CALCULATED.3IONS-SCNC: 11.7 MMOL/L (ref 5–15)
AST SERPL-CCNC: 40 U/L (ref 1–32)
BASOPHILS # BLD AUTO: 0.19 10*3/MM3 (ref 0–0.2)
BASOPHILS NFR BLD AUTO: 0.8 % (ref 0–1.5)
BILIRUB SERPL-MCNC: 2.5 MG/DL (ref 0–1.2)
BUN SERPL-MCNC: 7 MG/DL (ref 8–23)
BUN/CREAT SERPL: 8.2 (ref 7–25)
CALCIUM SPEC-SCNC: 9 MG/DL (ref 8.6–10.5)
CHLORIDE SERPL-SCNC: 103 MMOL/L (ref 98–107)
CO2 SERPL-SCNC: 23.3 MMOL/L (ref 22–29)
CREAT SERPL-MCNC: 0.85 MG/DL (ref 0.57–1)
DEPRECATED RDW RBC AUTO: 95 FL (ref 37–54)
EGFRCR SERPLBLD CKD-EPI 2021: 73.8 ML/MIN/1.73
EOSINOPHIL # BLD AUTO: 0.15 10*3/MM3 (ref 0–0.4)
EOSINOPHIL NFR BLD AUTO: 0.6 % (ref 0.3–6.2)
ERYTHROCYTE [DISTWIDTH] IN BLOOD BY AUTOMATED COUNT: 23.4 % (ref 12.3–15.4)
GLOBULIN UR ELPH-MCNC: 1.6 GM/DL
GLUCOSE SERPL-MCNC: 147 MG/DL (ref 65–99)
HCT VFR BLD AUTO: 33.4 % (ref 34–46.6)
HGB BLD-MCNC: 9.8 G/DL (ref 12–15.9)
LDH SERPL-CCNC: 728 U/L (ref 135–214)
LYMPHOCYTES # BLD AUTO: 3.92 10*3/MM3 (ref 0.7–3.1)
LYMPHOCYTES NFR BLD AUTO: 16.7 % (ref 19.6–45.3)
MAGNESIUM SERPL-MCNC: 1.9 MG/DL (ref 1.6–2.4)
MCH RBC QN AUTO: 34.1 PG (ref 26.6–33)
MCHC RBC AUTO-ENTMCNC: 29.3 G/DL (ref 31.5–35.7)
MCV RBC AUTO: 116.4 FL (ref 79–97)
MONOCYTES # BLD AUTO: 1.91 10*3/MM3 (ref 0.1–0.9)
MONOCYTES NFR BLD AUTO: 8.2 % (ref 5–12)
NEUTROPHILS NFR BLD AUTO: 17.25 10*3/MM3 (ref 1.7–7)
NEUTROPHILS NFR BLD AUTO: 73.7 % (ref 42.7–76)
PHOSPHATE SERPL-MCNC: 2.9 MG/DL (ref 2.5–4.5)
PLATELET # BLD AUTO: 143 10*3/MM3 (ref 140–450)
PMV BLD AUTO: 9.1 FL (ref 6–12)
POTASSIUM SERPL-SCNC: 3.7 MMOL/L (ref 3.5–5.2)
PROT SERPL-MCNC: 5.6 G/DL (ref 6–8.5)
RBC # BLD AUTO: 2.87 10*6/MM3 (ref 3.77–5.28)
SODIUM SERPL-SCNC: 138 MMOL/L (ref 136–145)
URATE SERPL-MCNC: 2.5 MG/DL (ref 2.4–5.7)
WBC NRBC COR # BLD AUTO: 23.42 10*3/MM3 (ref 3.4–10.8)

## 2025-03-03 PROCEDURE — 83615 LACTATE (LD) (LDH) ENZYME: CPT | Performed by: STUDENT IN AN ORGANIZED HEALTH CARE EDUCATION/TRAINING PROGRAM

## 2025-03-03 PROCEDURE — 85025 COMPLETE CBC W/AUTO DIFF WBC: CPT | Performed by: STUDENT IN AN ORGANIZED HEALTH CARE EDUCATION/TRAINING PROGRAM

## 2025-03-03 PROCEDURE — 80053 COMPREHEN METABOLIC PANEL: CPT | Performed by: STUDENT IN AN ORGANIZED HEALTH CARE EDUCATION/TRAINING PROGRAM

## 2025-03-03 PROCEDURE — 84100 ASSAY OF PHOSPHORUS: CPT | Performed by: STUDENT IN AN ORGANIZED HEALTH CARE EDUCATION/TRAINING PROGRAM

## 2025-03-03 PROCEDURE — 36415 COLL VENOUS BLD VENIPUNCTURE: CPT

## 2025-03-03 PROCEDURE — 84550 ASSAY OF BLOOD/URIC ACID: CPT | Performed by: STUDENT IN AN ORGANIZED HEALTH CARE EDUCATION/TRAINING PROGRAM

## 2025-03-03 PROCEDURE — 83735 ASSAY OF MAGNESIUM: CPT | Performed by: STUDENT IN AN ORGANIZED HEALTH CARE EDUCATION/TRAINING PROGRAM

## 2025-03-03 RX ORDER — HEPARIN SODIUM (PORCINE) LOCK FLUSH IV SOLN 100 UNIT/ML 100 UNIT/ML
500 SOLUTION INTRAVENOUS AS NEEDED
Status: DISCONTINUED | OUTPATIENT
Start: 2025-03-03 | End: 2025-03-04 | Stop reason: HOSPADM

## 2025-03-03 RX ORDER — ALLOPURINOL 300 MG/1
300 TABLET ORAL DAILY
Qty: 30 TABLET | Refills: 0 | Status: SHIPPED | OUTPATIENT
Start: 2025-03-03 | End: 2025-03-05 | Stop reason: SDUPTHER

## 2025-03-03 RX ORDER — SODIUM CHLORIDE 0.9 % (FLUSH) 0.9 %
20 SYRINGE (ML) INJECTION AS NEEDED
Status: DISCONTINUED | OUTPATIENT
Start: 2025-03-03 | End: 2025-03-04 | Stop reason: HOSPADM

## 2025-03-03 RX ORDER — SODIUM CHLORIDE 0.9 % (FLUSH) 0.9 %
20 SYRINGE (ML) INJECTION AS NEEDED
Status: CANCELLED | OUTPATIENT
Start: 2025-03-03

## 2025-03-03 RX ORDER — HEPARIN SODIUM (PORCINE) LOCK FLUSH IV SOLN 100 UNIT/ML 100 UNIT/ML
500 SOLUTION INTRAVENOUS AS NEEDED
Status: CANCELLED | OUTPATIENT
Start: 2025-03-03

## 2025-03-03 NOTE — PROGRESS NOTES
Pt to port chair for PF and labs prior to appt with Dr. Sethi and infusion tomorrow. Pt states she did not put any EMLA cream on port and would be more comfortable with venipuncture instead of PF. Venipuncture to LAC for labs. Labs collected and sent to lab per protocol. Pt discharged home and aware of appt tomorrow.

## 2025-03-04 ENCOUNTER — HOSPITAL ENCOUNTER (OUTPATIENT)
Dept: ONCOLOGY | Facility: HOSPITAL | Age: 70
Discharge: HOME | End: 2025-03-04
Payer: MEDICARE

## 2025-03-04 ENCOUNTER — APPOINTMENT (OUTPATIENT)
Dept: LAB | Facility: HOSPITAL | Age: 70
End: 2025-03-04
Payer: MEDICARE

## 2025-03-04 ENCOUNTER — APPOINTMENT (OUTPATIENT)
Dept: ONCOLOGY | Facility: HOSPITAL | Age: 70
End: 2025-03-04
Payer: MEDICARE

## 2025-03-04 ENCOUNTER — OFFICE VISIT (OUTPATIENT)
Dept: ONCOLOGY | Facility: CLINIC | Age: 70
End: 2025-03-04
Payer: MEDICARE

## 2025-03-04 VITALS
DIASTOLIC BLOOD PRESSURE: 70 MMHG | HEIGHT: 62 IN | OXYGEN SATURATION: 92 % | HEART RATE: 92 BPM | SYSTOLIC BLOOD PRESSURE: 124 MMHG | BODY MASS INDEX: 31.19 KG/M2 | WEIGHT: 169.5 LBS

## 2025-03-04 VITALS — HEART RATE: 66 BPM | DIASTOLIC BLOOD PRESSURE: 68 MMHG | SYSTOLIC BLOOD PRESSURE: 106 MMHG

## 2025-03-04 DIAGNOSIS — C85.80 LYMPHOMA MALIGNANT, LARGE CELL: ICD-10-CM

## 2025-03-04 DIAGNOSIS — D64.9 ANEMIA, UNSPECIFIED TYPE: ICD-10-CM

## 2025-03-04 DIAGNOSIS — Z45.2 ENCOUNTER FOR CARE RELATED TO VASCULAR ACCESS PORT: ICD-10-CM

## 2025-03-04 DIAGNOSIS — Z45.2 ENCOUNTER FOR CARE RELATED TO VASCULAR ACCESS PORT: Primary | ICD-10-CM

## 2025-03-04 DIAGNOSIS — E80.6 HYPERBILIRUBINEMIA: ICD-10-CM

## 2025-03-04 DIAGNOSIS — C85.80 LYMPHOMA MALIGNANT, LARGE CELL: Primary | ICD-10-CM

## 2025-03-04 DIAGNOSIS — R59.0 AXILLARY LYMPHADENOPATHY: ICD-10-CM

## 2025-03-04 DIAGNOSIS — C83.31 DIFFUSE LARGE B-CELL LYMPHOMA, LYMPH NODES OF HEAD, FACE, AND NECK: ICD-10-CM

## 2025-03-04 PROCEDURE — 25010000002 PALONOSETRON PER 25 MCG: Performed by: STUDENT IN AN ORGANIZED HEALTH CARE EDUCATION/TRAINING PROGRAM

## 2025-03-04 PROCEDURE — 25010000002 CYCLOPHOSPHAMIDE 2 GM/10ML SOLUTION 10 ML VIAL: Performed by: STUDENT IN AN ORGANIZED HEALTH CARE EDUCATION/TRAINING PROGRAM

## 2025-03-04 PROCEDURE — 25010000002 VINCRISTINE PER 1 MG: Performed by: STUDENT IN AN ORGANIZED HEALTH CARE EDUCATION/TRAINING PROGRAM

## 2025-03-04 PROCEDURE — 96375 TX/PRO/DX INJ NEW DRUG ADDON: CPT

## 2025-03-04 PROCEDURE — 25010000002 HEPARIN LOCK FLUSH PER 10 UNITS: Performed by: STUDENT IN AN ORGANIZED HEALTH CARE EDUCATION/TRAINING PROGRAM

## 2025-03-04 PROCEDURE — 1126F AMNT PAIN NOTED NONE PRSNT: CPT | Performed by: STUDENT IN AN ORGANIZED HEALTH CARE EDUCATION/TRAINING PROGRAM

## 2025-03-04 PROCEDURE — 25010000002 RITUXIMAB 10 MG/ML SOLUTION 10 ML VIAL: Performed by: STUDENT IN AN ORGANIZED HEALTH CARE EDUCATION/TRAINING PROGRAM

## 2025-03-04 PROCEDURE — 25010000002 RITUXIMAB 10 MG/ML SOLUTION 50 ML VIAL: Performed by: STUDENT IN AN ORGANIZED HEALTH CARE EDUCATION/TRAINING PROGRAM

## 2025-03-04 PROCEDURE — 96411 CHEMO IV PUSH ADDL DRUG: CPT

## 2025-03-04 PROCEDURE — 96417 CHEMO IV INFUS EACH ADDL SEQ: CPT

## 2025-03-04 PROCEDURE — 99214 OFFICE O/P EST MOD 30 MIN: CPT | Performed by: STUDENT IN AN ORGANIZED HEALTH CARE EDUCATION/TRAINING PROGRAM

## 2025-03-04 PROCEDURE — 25010000002 DIPHENHYDRAMINE PER 50 MG: Performed by: STUDENT IN AN ORGANIZED HEALTH CARE EDUCATION/TRAINING PROGRAM

## 2025-03-04 PROCEDURE — 96415 CHEMO IV INFUSION ADDL HR: CPT

## 2025-03-04 PROCEDURE — 63710000001 ACETAMINOPHEN 325 MG TABLET: Performed by: STUDENT IN AN ORGANIZED HEALTH CARE EDUCATION/TRAINING PROGRAM

## 2025-03-04 PROCEDURE — 25810000003 SODIUM CHLORIDE 0.9 % SOLUTION 250 ML FLEX CONT: Performed by: STUDENT IN AN ORGANIZED HEALTH CARE EDUCATION/TRAINING PROGRAM

## 2025-03-04 PROCEDURE — 25010000002 ETOPOSIDE 1 GM/50ML SOLUTION 50 ML VIAL: Performed by: STUDENT IN AN ORGANIZED HEALTH CARE EDUCATION/TRAINING PROGRAM

## 2025-03-04 PROCEDURE — 96413 CHEMO IV INFUSION 1 HR: CPT

## 2025-03-04 PROCEDURE — A9270 NON-COVERED ITEM OR SERVICE: HCPCS | Performed by: STUDENT IN AN ORGANIZED HEALTH CARE EDUCATION/TRAINING PROGRAM

## 2025-03-04 RX ORDER — MEPERIDINE HYDROCHLORIDE 25 MG/ML
25 INJECTION INTRAMUSCULAR; INTRAVENOUS; SUBCUTANEOUS
Status: CANCELLED | OUTPATIENT
Start: 2025-03-04

## 2025-03-04 RX ORDER — HYDROCORTISONE SODIUM SUCCINATE 100 MG/2ML
100 INJECTION INTRAMUSCULAR; INTRAVENOUS AS NEEDED
Status: CANCELLED | OUTPATIENT
Start: 2025-03-04

## 2025-03-04 RX ORDER — DIPHENHYDRAMINE HYDROCHLORIDE 50 MG/ML
50 INJECTION INTRAMUSCULAR; INTRAVENOUS ONCE
Status: COMPLETED | OUTPATIENT
Start: 2025-03-04 | End: 2025-03-04

## 2025-03-04 RX ORDER — SODIUM CHLORIDE 9 MG/ML
20 INJECTION, SOLUTION INTRAVENOUS ONCE
Status: DISCONTINUED | OUTPATIENT
Start: 2025-03-04 | End: 2025-03-05 | Stop reason: HOSPADM

## 2025-03-04 RX ORDER — SODIUM CHLORIDE 9 MG/ML
20 INJECTION, SOLUTION INTRAVENOUS ONCE
Status: CANCELLED | OUTPATIENT
Start: 2025-03-04

## 2025-03-04 RX ORDER — HEPARIN SODIUM (PORCINE) LOCK FLUSH IV SOLN 100 UNIT/ML 100 UNIT/ML
500 SOLUTION INTRAVENOUS AS NEEDED
Status: CANCELLED | OUTPATIENT
Start: 2025-03-04

## 2025-03-04 RX ORDER — PALONOSETRON 0.05 MG/ML
0.25 INJECTION, SOLUTION INTRAVENOUS ONCE
Status: COMPLETED | OUTPATIENT
Start: 2025-03-04 | End: 2025-03-04

## 2025-03-04 RX ORDER — SODIUM CHLORIDE 9 MG/ML
20 INJECTION, SOLUTION INTRAVENOUS ONCE
Status: CANCELLED | OUTPATIENT
Start: 2025-03-05

## 2025-03-04 RX ORDER — HEPARIN SODIUM (PORCINE) LOCK FLUSH IV SOLN 100 UNIT/ML 100 UNIT/ML
500 SOLUTION INTRAVENOUS AS NEEDED
Status: DISCONTINUED | OUTPATIENT
Start: 2025-03-04 | End: 2025-03-05 | Stop reason: HOSPADM

## 2025-03-04 RX ORDER — ACETAMINOPHEN 325 MG/1
650 TABLET ORAL ONCE
Status: COMPLETED | OUTPATIENT
Start: 2025-03-04 | End: 2025-03-04

## 2025-03-04 RX ORDER — SODIUM CHLORIDE 9 MG/ML
20 INJECTION, SOLUTION INTRAVENOUS ONCE
Status: CANCELLED | OUTPATIENT
Start: 2025-03-06

## 2025-03-04 RX ORDER — SODIUM CHLORIDE 0.9 % (FLUSH) 0.9 %
20 SYRINGE (ML) INJECTION AS NEEDED
Status: CANCELLED | OUTPATIENT
Start: 2025-03-04

## 2025-03-04 RX ORDER — DIPHENHYDRAMINE HYDROCHLORIDE 50 MG/ML
50 INJECTION INTRAMUSCULAR; INTRAVENOUS AS NEEDED
Status: CANCELLED | OUTPATIENT
Start: 2025-03-04

## 2025-03-04 RX ORDER — FAMOTIDINE 10 MG/ML
20 INJECTION, SOLUTION INTRAVENOUS AS NEEDED
Status: CANCELLED | OUTPATIENT
Start: 2025-03-04

## 2025-03-04 RX ORDER — SODIUM CHLORIDE 0.9 % (FLUSH) 0.9 %
20 SYRINGE (ML) INJECTION AS NEEDED
Status: DISCONTINUED | OUTPATIENT
Start: 2025-03-04 | End: 2025-03-05 | Stop reason: HOSPADM

## 2025-03-04 RX ADMIN — Medication 20 ML: at 14:12

## 2025-03-04 RX ADMIN — HEPARIN 500 UNITS: 100 SYRINGE at 14:12

## 2025-03-04 RX ADMIN — RITUXIMAB 680 MG: 10 INJECTION, SOLUTION INTRAVENOUS at 10:11

## 2025-03-04 RX ADMIN — ACETAMINOPHEN 650 MG: 325 TABLET ORAL at 08:55

## 2025-03-04 RX ADMIN — PALONOSETRON 0.25 MG: 0.05 INJECTION, SOLUTION INTRAVENOUS at 08:56

## 2025-03-04 RX ADMIN — DIPHENHYDRAMINE HYDROCHLORIDE 50 MG: 50 INJECTION INTRAMUSCULAR; INTRAVENOUS at 09:03

## 2025-03-04 RX ADMIN — SODIUM CHLORIDE 90 MG: 9 INJECTION, SOLUTION INTRAVENOUS at 12:44

## 2025-03-04 RX ADMIN — SODIUM CHLORIDE 1360 MG: 9 INJECTION, SOLUTION INTRAVENOUS at 12:03

## 2025-03-04 RX ADMIN — VINCRISTINE SULFATE 1 MG: 1 INJECTION, SOLUTION INTRAVENOUS at 13:53

## 2025-03-05 ENCOUNTER — HOSPITAL ENCOUNTER (OUTPATIENT)
Dept: ONCOLOGY | Facility: HOSPITAL | Age: 70
Discharge: HOME | End: 2025-03-05
Admitting: STUDENT IN AN ORGANIZED HEALTH CARE EDUCATION/TRAINING PROGRAM
Payer: MEDICARE

## 2025-03-05 VITALS
BODY MASS INDEX: 31.1 KG/M2 | SYSTOLIC BLOOD PRESSURE: 127 MMHG | WEIGHT: 169 LBS | DIASTOLIC BLOOD PRESSURE: 77 MMHG | RESPIRATION RATE: 16 BRPM | HEIGHT: 62 IN | HEART RATE: 95 BPM | OXYGEN SATURATION: 91 % | TEMPERATURE: 98.2 F

## 2025-03-05 DIAGNOSIS — C85.80 LYMPHOMA MALIGNANT, LARGE CELL: ICD-10-CM

## 2025-03-05 DIAGNOSIS — C85.80 LYMPHOMA MALIGNANT, LARGE CELL: Primary | ICD-10-CM

## 2025-03-05 DIAGNOSIS — Z45.2 ENCOUNTER FOR CARE RELATED TO VASCULAR ACCESS PORT: ICD-10-CM

## 2025-03-05 PROCEDURE — 25010000002 ETOPOSIDE 1 GM/50ML SOLUTION 50 ML VIAL: Performed by: STUDENT IN AN ORGANIZED HEALTH CARE EDUCATION/TRAINING PROGRAM

## 2025-03-05 PROCEDURE — 96413 CHEMO IV INFUSION 1 HR: CPT

## 2025-03-05 PROCEDURE — 25010000002 HEPARIN LOCK FLUSH PER 10 UNITS: Performed by: STUDENT IN AN ORGANIZED HEALTH CARE EDUCATION/TRAINING PROGRAM

## 2025-03-05 PROCEDURE — 25810000003 SODIUM CHLORIDE 0.9 % SOLUTION 250 ML FLEX CONT: Performed by: STUDENT IN AN ORGANIZED HEALTH CARE EDUCATION/TRAINING PROGRAM

## 2025-03-05 RX ORDER — HEPARIN SODIUM (PORCINE) LOCK FLUSH IV SOLN 100 UNIT/ML 100 UNIT/ML
500 SOLUTION INTRAVENOUS AS NEEDED
Status: DISCONTINUED | OUTPATIENT
Start: 2025-03-05 | End: 2025-03-06 | Stop reason: HOSPADM

## 2025-03-05 RX ORDER — SODIUM CHLORIDE 0.9 % (FLUSH) 0.9 %
20 SYRINGE (ML) INJECTION AS NEEDED
Status: DISCONTINUED | OUTPATIENT
Start: 2025-03-05 | End: 2025-03-06 | Stop reason: HOSPADM

## 2025-03-05 RX ORDER — PREDNISONE 50 MG/1
100 TABLET ORAL DAILY
Qty: 10 TABLET | Refills: 5 | Status: SHIPPED | OUTPATIENT
Start: 2025-03-05

## 2025-03-05 RX ORDER — DAPSONE 100 MG/1
100 TABLET ORAL DAILY
Qty: 30 TABLET | Refills: 1 | Status: SHIPPED | OUTPATIENT
Start: 2025-03-05

## 2025-03-05 RX ORDER — SODIUM CHLORIDE 9 MG/ML
20 INJECTION, SOLUTION INTRAVENOUS ONCE
Status: DISCONTINUED | OUTPATIENT
Start: 2025-03-05 | End: 2025-03-06 | Stop reason: HOSPADM

## 2025-03-05 RX ORDER — HEPARIN SODIUM (PORCINE) LOCK FLUSH IV SOLN 100 UNIT/ML 100 UNIT/ML
500 SOLUTION INTRAVENOUS AS NEEDED
Status: CANCELLED | OUTPATIENT
Start: 2025-03-05

## 2025-03-05 RX ORDER — SODIUM CHLORIDE 0.9 % (FLUSH) 0.9 %
20 SYRINGE (ML) INJECTION AS NEEDED
Status: CANCELLED | OUTPATIENT
Start: 2025-03-05

## 2025-03-05 RX ORDER — ALLOPURINOL 300 MG/1
300 TABLET ORAL DAILY
Qty: 30 TABLET | Refills: 0 | Status: SHIPPED | OUTPATIENT
Start: 2025-03-05

## 2025-03-05 RX ADMIN — Medication 20 ML: at 14:47

## 2025-03-05 RX ADMIN — HEPARIN 500 UNITS: 100 SYRINGE at 14:47

## 2025-03-05 RX ADMIN — SODIUM CHLORIDE 90 MG: 9 INJECTION, SOLUTION INTRAVENOUS at 13:40

## 2025-03-06 ENCOUNTER — HOSPITAL ENCOUNTER (OUTPATIENT)
Dept: ONCOLOGY | Facility: HOSPITAL | Age: 70
Discharge: HOME | End: 2025-03-06
Payer: MEDICARE

## 2025-03-06 VITALS
HEIGHT: 62 IN | DIASTOLIC BLOOD PRESSURE: 54 MMHG | WEIGHT: 172 LBS | SYSTOLIC BLOOD PRESSURE: 122 MMHG | OXYGEN SATURATION: 93 % | BODY MASS INDEX: 31.65 KG/M2 | HEART RATE: 111 BPM | RESPIRATION RATE: 14 BRPM | TEMPERATURE: 98.2 F

## 2025-03-06 DIAGNOSIS — Z45.2 ENCOUNTER FOR CARE RELATED TO VASCULAR ACCESS PORT: ICD-10-CM

## 2025-03-06 DIAGNOSIS — C85.80 LYMPHOMA MALIGNANT, LARGE CELL: Primary | ICD-10-CM

## 2025-03-06 PROCEDURE — 25810000003 SODIUM CHLORIDE 0.9 % SOLUTION 250 ML FLEX CONT: Performed by: STUDENT IN AN ORGANIZED HEALTH CARE EDUCATION/TRAINING PROGRAM

## 2025-03-06 PROCEDURE — 25010000002 ETOPOSIDE 1 GM/50ML SOLUTION 50 ML VIAL: Performed by: STUDENT IN AN ORGANIZED HEALTH CARE EDUCATION/TRAINING PROGRAM

## 2025-03-06 PROCEDURE — 96413 CHEMO IV INFUSION 1 HR: CPT

## 2025-03-06 PROCEDURE — 25010000002 HEPARIN LOCK FLUSH PER 10 UNITS: Performed by: STUDENT IN AN ORGANIZED HEALTH CARE EDUCATION/TRAINING PROGRAM

## 2025-03-06 RX ORDER — HEPARIN SODIUM (PORCINE) LOCK FLUSH IV SOLN 100 UNIT/ML 100 UNIT/ML
500 SOLUTION INTRAVENOUS AS NEEDED
Status: CANCELLED | OUTPATIENT
Start: 2025-03-06

## 2025-03-06 RX ORDER — SODIUM CHLORIDE 0.9 % (FLUSH) 0.9 %
20 SYRINGE (ML) INJECTION AS NEEDED
Status: CANCELLED | OUTPATIENT
Start: 2025-03-06

## 2025-03-06 RX ORDER — SODIUM CHLORIDE 0.9 % (FLUSH) 0.9 %
20 SYRINGE (ML) INJECTION AS NEEDED
Status: DISCONTINUED | OUTPATIENT
Start: 2025-03-06 | End: 2025-03-07 | Stop reason: HOSPADM

## 2025-03-06 RX ORDER — SODIUM CHLORIDE 9 MG/ML
20 INJECTION, SOLUTION INTRAVENOUS ONCE
Status: DISCONTINUED | OUTPATIENT
Start: 2025-03-06 | End: 2025-03-07 | Stop reason: HOSPADM

## 2025-03-06 RX ORDER — HEPARIN SODIUM (PORCINE) LOCK FLUSH IV SOLN 100 UNIT/ML 100 UNIT/ML
500 SOLUTION INTRAVENOUS AS NEEDED
Status: DISCONTINUED | OUTPATIENT
Start: 2025-03-06 | End: 2025-03-07 | Stop reason: HOSPADM

## 2025-03-06 RX ADMIN — Medication 20 ML: at 14:53

## 2025-03-06 RX ADMIN — SODIUM CHLORIDE 90 MG: 0.9 INJECTION, SOLUTION INTRAVENOUS at 13:50

## 2025-03-06 RX ADMIN — HEPARIN 500 UNITS: 100 SYRINGE at 14:53

## 2025-03-06 NOTE — PROGRESS NOTES
Pt is here for D3 of etoposide. Pt has no new complaints and stated no changes since yesterday or 3/4. Pt stated she is just more tired which is normal for her. Pt stated she did take her steroids this morning is taking them daily.  Port accessed and flushed with good blood return noted. No labs collected. Port flushed with saline and heparin prior to needle removal. Per Dr. Sethi hold the stimufend this cycle based on CBC from 3/3 and if Patient's counts drop with her weekly labs next week then Neupogen may be added. Patient verbalized understanding. Pt tolerated treatment and was discharged with AVS.

## 2025-03-07 ENCOUNTER — HOSPITAL ENCOUNTER (OUTPATIENT)
Dept: INTERVENTIONAL RADIOLOGY/VASCULAR | Facility: HOSPITAL | Age: 70
Discharge: HOME | End: 2025-03-07
Payer: MEDICARE

## 2025-03-07 VITALS
SYSTOLIC BLOOD PRESSURE: 108 MMHG | OXYGEN SATURATION: 94 % | WEIGHT: 172 LBS | TEMPERATURE: 98.2 F | HEIGHT: 62 IN | BODY MASS INDEX: 31.65 KG/M2 | HEART RATE: 75 BPM | RESPIRATION RATE: 16 BRPM | DIASTOLIC BLOOD PRESSURE: 60 MMHG

## 2025-03-07 DIAGNOSIS — C83.31 DIFFUSE LARGE B-CELL LYMPHOMA, LYMPH NODES OF HEAD, FACE, AND NECK: ICD-10-CM

## 2025-03-07 DIAGNOSIS — C85.80 LYMPHOMA MALIGNANT, LARGE CELL: Primary | ICD-10-CM

## 2025-03-07 DIAGNOSIS — Z51.11 ENCOUNTER FOR ANTINEOPLASTIC CHEMOTHERAPY: ICD-10-CM

## 2025-03-07 LAB
ANISOCYTOSIS BLD QL: ABNORMAL
APTT PPP: 28.3 SECONDS (ref 22.7–35.4)
BASO STIPL COARSE BLD QL SMEAR: ABNORMAL
DEPRECATED RDW RBC AUTO: 81 FL (ref 37–54)
ERYTHROCYTE [DISTWIDTH] IN BLOOD BY AUTOMATED COUNT: 19.1 % (ref 12.3–15.4)
HCT VFR BLD AUTO: 26.5 % (ref 34–46.6)
HGB BLD-MCNC: 7.7 G/DL (ref 12–15.9)
INR PPP: 1.12 (ref 0.9–1.1)
LYMPHOCYTES # BLD MANUAL: 0.74 10*3/MM3 (ref 0.7–3.1)
MACROCYTES BLD QL SMEAR: ABNORMAL
MCH RBC QN AUTO: 33 PG (ref 26.6–33)
MCHC RBC AUTO-ENTMCNC: 29.1 G/DL (ref 31.5–35.7)
MCV RBC AUTO: 113.7 FL (ref 79–97)
METAMYELOCYTES NFR BLD MANUAL: 1 % (ref 0–0)
NEUTROPHILS # BLD AUTO: 8.43 10*3/MM3 (ref 1.7–7)
NEUTROPHILS NFR BLD MANUAL: 81 % (ref 42.7–76)
NEUTS BAND NFR BLD MANUAL: 10 % (ref 0–5)
PLAT MORPH BLD: NORMAL
PLATELET # BLD AUTO: 140 10*3/MM3 (ref 140–450)
PMV BLD AUTO: 9.1 FL (ref 6–12)
POIKILOCYTOSIS BLD QL SMEAR: ABNORMAL
POLYCHROMASIA BLD QL SMEAR: ABNORMAL
PROTHROMBIN TIME: 14.3 SECONDS (ref 11.7–14.2)
RBC # BLD AUTO: 2.33 10*6/MM3 (ref 3.77–5.28)
SCAN SLIDE: NORMAL
VARIANT LYMPHS NFR BLD MANUAL: 2 % (ref 19.6–45.3)
VARIANT LYMPHS NFR BLD MANUAL: 6 % (ref 0–5)
WBC MORPH BLD: NORMAL
WBC NRBC COR # BLD AUTO: 9.26 10*3/MM3 (ref 3.4–10.8)

## 2025-03-07 PROCEDURE — 77003 FLUOROGUIDE FOR SPINE INJECT: CPT

## 2025-03-07 PROCEDURE — 85610 PROTHROMBIN TIME: CPT | Performed by: RADIOLOGY

## 2025-03-07 PROCEDURE — 25010000002 LIDOCAINE 1 % SOLUTION

## 2025-03-07 PROCEDURE — 25010000002 METHOTREXATE PF 100 MG/4ML SOLUTION 2 ML VIAL: Performed by: STUDENT IN AN ORGANIZED HEALTH CARE EDUCATION/TRAINING PROGRAM

## 2025-03-07 PROCEDURE — 85025 COMPLETE CBC W/AUTO DIFF WBC: CPT | Performed by: RADIOLOGY

## 2025-03-07 PROCEDURE — 96450 CHEMOTHERAPY INTO CNS: CPT

## 2025-03-07 PROCEDURE — 85007 BL SMEAR W/DIFF WBC COUNT: CPT | Performed by: RADIOLOGY

## 2025-03-07 PROCEDURE — 85730 THROMBOPLASTIN TIME PARTIAL: CPT | Performed by: RADIOLOGY

## 2025-03-07 RX ORDER — LIDOCAINE HYDROCHLORIDE 10 MG/ML
INJECTION, SOLUTION INFILTRATION; PERINEURAL AS NEEDED
Status: COMPLETED | OUTPATIENT
Start: 2025-03-07 | End: 2025-03-07

## 2025-03-07 RX ORDER — HEPARIN SODIUM (PORCINE) LOCK FLUSH IV SOLN 100 UNIT/ML 100 UNIT/ML
500 SOLUTION INTRAVENOUS AS NEEDED
Status: DISCONTINUED | OUTPATIENT
Start: 2025-03-07 | End: 2025-03-08 | Stop reason: HOSPADM

## 2025-03-07 RX ADMIN — LIDOCAINE HYDROCHLORIDE 8 ML: 10 INJECTION, SOLUTION INFILTRATION; PERINEURAL at 12:56

## 2025-03-07 RX ADMIN — METHOTREXATE 12 MG: 25 INJECTION INTRA-ARTERIAL; INTRAMUSCULAR; INTRATHECAL; INTRAVENOUS at 12:59

## 2025-03-07 NOTE — DISCHARGE INSTRUCTIONS
You may resume your regular medications. Follow up with Dr. Sethi as instructed.  Rest at home for the remainder of the day.

## 2025-03-10 ENCOUNTER — HOSPITAL ENCOUNTER (OUTPATIENT)
Dept: INFUSION THERAPY | Facility: HOSPITAL | Age: 70
Discharge: HOME | End: 2025-03-10
Payer: MEDICARE

## 2025-03-10 ENCOUNTER — HOSPITAL ENCOUNTER (OUTPATIENT)
Dept: ONCOLOGY | Facility: HOSPITAL | Age: 70
Discharge: HOME | End: 2025-03-10
Admitting: STUDENT IN AN ORGANIZED HEALTH CARE EDUCATION/TRAINING PROGRAM
Payer: MEDICARE

## 2025-03-10 ENCOUNTER — TELEPHONE (OUTPATIENT)
Dept: ONCOLOGY | Facility: CLINIC | Age: 70
End: 2025-03-10
Payer: MEDICARE

## 2025-03-10 VITALS
DIASTOLIC BLOOD PRESSURE: 73 MMHG | HEART RATE: 67 BPM | RESPIRATION RATE: 16 BRPM | TEMPERATURE: 96.7 F | SYSTOLIC BLOOD PRESSURE: 133 MMHG | OXYGEN SATURATION: 94 %

## 2025-03-10 DIAGNOSIS — C83.31 DIFFUSE LARGE B-CELL LYMPHOMA, LYMPH NODES OF HEAD, FACE, AND NECK: ICD-10-CM

## 2025-03-10 DIAGNOSIS — D64.9 ANEMIA, UNSPECIFIED TYPE: ICD-10-CM

## 2025-03-10 DIAGNOSIS — C85.80 LYMPHOMA MALIGNANT, LARGE CELL: ICD-10-CM

## 2025-03-10 DIAGNOSIS — Z45.2 ENCOUNTER FOR CARE RELATED TO VASCULAR ACCESS PORT: Primary | ICD-10-CM

## 2025-03-10 DIAGNOSIS — D64.9 SYMPTOMATIC ANEMIA: Primary | ICD-10-CM

## 2025-03-10 DIAGNOSIS — D64.9 ANEMIA, UNSPECIFIED TYPE: Primary | ICD-10-CM

## 2025-03-10 LAB
ABO GROUP BLD: NORMAL
ALBUMIN SERPL-MCNC: 3.8 G/DL (ref 3.5–5.2)
ALBUMIN/GLOB SERPL: 2.9 G/DL
ALP SERPL-CCNC: 51 U/L (ref 39–117)
ALT SERPL W P-5'-P-CCNC: 59 U/L (ref 1–33)
ANION GAP SERPL CALCULATED.3IONS-SCNC: 13.2 MMOL/L (ref 5–15)
AST SERPL-CCNC: 31 U/L (ref 1–32)
BASOPHILS # BLD AUTO: 0.01 10*3/MM3 (ref 0–0.2)
BASOPHILS # BLD AUTO: 0.01 10*3/MM3 (ref 0–0.2)
BASOPHILS NFR BLD AUTO: 0.1 % (ref 0–1.5)
BASOPHILS NFR BLD AUTO: 0.3 % (ref 0–1.5)
BB HOLD TUBE: NORMAL
BILIRUB SERPL-MCNC: 1.3 MG/DL (ref 0–1.2)
BLD GP AB SCN SERPL QL: NEGATIVE
BUN SERPL-MCNC: 17 MG/DL (ref 8–23)
BUN/CREAT SERPL: 21 (ref 7–25)
CALCIUM SPEC-SCNC: 9 MG/DL (ref 8.6–10.5)
CHLORIDE SERPL-SCNC: 104 MMOL/L (ref 98–107)
CO2 SERPL-SCNC: 20.8 MMOL/L (ref 22–29)
CREAT SERPL-MCNC: 0.81 MG/DL (ref 0.57–1)
DEPRECATED RDW RBC AUTO: 68.7 FL (ref 37–54)
DEPRECATED RDW RBC AUTO: 72.2 FL (ref 37–54)
EGFRCR SERPLBLD CKD-EPI 2021: 78.2 ML/MIN/1.73
EOSINOPHIL # BLD AUTO: 0 10*3/MM3 (ref 0–0.4)
EOSINOPHIL # BLD AUTO: 0 10*3/MM3 (ref 0–0.4)
EOSINOPHIL NFR BLD AUTO: 0 % (ref 0.3–6.2)
EOSINOPHIL NFR BLD AUTO: 0 % (ref 0.3–6.2)
ERYTHROCYTE [DISTWIDTH] IN BLOOD BY AUTOMATED COUNT: 17.1 % (ref 12.3–15.4)
ERYTHROCYTE [DISTWIDTH] IN BLOOD BY AUTOMATED COUNT: 17.4 % (ref 12.3–15.4)
GLOBULIN UR ELPH-MCNC: 1.3 GM/DL
GLUCOSE SERPL-MCNC: 129 MG/DL (ref 65–99)
HCT VFR BLD AUTO: 21.6 % (ref 34–46.6)
HCT VFR BLD AUTO: 21.8 % (ref 34–46.6)
HGB BLD-MCNC: 6.3 G/DL (ref 12–15.9)
HGB BLD-MCNC: 6.5 G/DL (ref 12–15.9)
IMM GRANULOCYTES # BLD AUTO: 0.1 10*3/MM3 (ref 0–0.05)
IMM GRANULOCYTES NFR BLD AUTO: 1.5 % (ref 0–0.5)
LYMPHOCYTES # BLD AUTO: 0.48 10*3/MM3 (ref 0.7–3.1)
LYMPHOCYTES # BLD AUTO: 0.77 10*3/MM3 (ref 0.7–3.1)
LYMPHOCYTES NFR BLD AUTO: 20.8 % (ref 19.6–45.3)
LYMPHOCYTES NFR BLD AUTO: 7 % (ref 19.6–45.3)
MCH RBC QN AUTO: 32.5 PG (ref 26.6–33)
MCH RBC QN AUTO: 34.2 PG (ref 26.6–33)
MCHC RBC AUTO-ENTMCNC: 28.9 G/DL (ref 31.5–35.7)
MCHC RBC AUTO-ENTMCNC: 30.1 G/DL (ref 31.5–35.7)
MCV RBC AUTO: 112.4 FL (ref 79–97)
MCV RBC AUTO: 113.7 FL (ref 79–97)
MONOCYTES # BLD AUTO: 0.03 10*3/MM3 (ref 0.1–0.9)
MONOCYTES # BLD AUTO: 0.03 10*3/MM3 (ref 0.1–0.9)
MONOCYTES NFR BLD AUTO: 0.4 % (ref 5–12)
MONOCYTES NFR BLD AUTO: 0.8 % (ref 5–12)
NEUTROPHILS NFR BLD AUTO: 2.89 10*3/MM3 (ref 1.7–7)
NEUTROPHILS NFR BLD AUTO: 6.24 10*3/MM3 (ref 1.7–7)
NEUTROPHILS NFR BLD AUTO: 78.1 % (ref 42.7–76)
NEUTROPHILS NFR BLD AUTO: 91 % (ref 42.7–76)
NRBC BLD AUTO-RTO: 0 /100 WBC (ref 0–0.2)
PLATELET # BLD AUTO: 111 10*3/MM3 (ref 140–450)
PLATELET # BLD AUTO: 118 10*3/MM3 (ref 140–450)
PMV BLD AUTO: 8.4 FL (ref 6–12)
PMV BLD AUTO: 9.2 FL (ref 6–12)
POTASSIUM SERPL-SCNC: 4 MMOL/L (ref 3.5–5.2)
PROT SERPL-MCNC: 5.1 G/DL (ref 6–8.5)
RBC # BLD AUTO: 1.9 10*6/MM3 (ref 3.77–5.28)
RBC # BLD AUTO: 1.94 10*6/MM3 (ref 3.77–5.28)
RH BLD: POSITIVE
SODIUM SERPL-SCNC: 138 MMOL/L (ref 136–145)
T&S EXPIRATION DATE: NORMAL
WBC NRBC COR # BLD AUTO: 3.7 10*3/MM3 (ref 3.4–10.8)
WBC NRBC COR # BLD AUTO: 6.86 10*3/MM3 (ref 3.4–10.8)

## 2025-03-10 PROCEDURE — 86900 BLOOD TYPING SEROLOGIC ABO: CPT | Performed by: STUDENT IN AN ORGANIZED HEALTH CARE EDUCATION/TRAINING PROGRAM

## 2025-03-10 PROCEDURE — 36415 COLL VENOUS BLD VENIPUNCTURE: CPT

## 2025-03-10 PROCEDURE — 86900 BLOOD TYPING SEROLOGIC ABO: CPT

## 2025-03-10 PROCEDURE — 85025 COMPLETE CBC W/AUTO DIFF WBC: CPT | Performed by: STUDENT IN AN ORGANIZED HEALTH CARE EDUCATION/TRAINING PROGRAM

## 2025-03-10 PROCEDURE — 36430 TRANSFUSION BLD/BLD COMPNT: CPT

## 2025-03-10 PROCEDURE — P9016 RBC LEUKOCYTES REDUCED: HCPCS

## 2025-03-10 PROCEDURE — 86850 RBC ANTIBODY SCREEN: CPT | Performed by: STUDENT IN AN ORGANIZED HEALTH CARE EDUCATION/TRAINING PROGRAM

## 2025-03-10 PROCEDURE — 25010000002 HEPARIN LOCK FLUSH PER 10 UNITS: Performed by: STUDENT IN AN ORGANIZED HEALTH CARE EDUCATION/TRAINING PROGRAM

## 2025-03-10 PROCEDURE — 86901 BLOOD TYPING SEROLOGIC RH(D): CPT | Performed by: STUDENT IN AN ORGANIZED HEALTH CARE EDUCATION/TRAINING PROGRAM

## 2025-03-10 PROCEDURE — 86923 COMPATIBILITY TEST ELECTRIC: CPT

## 2025-03-10 PROCEDURE — 80053 COMPREHEN METABOLIC PANEL: CPT | Performed by: STUDENT IN AN ORGANIZED HEALTH CARE EDUCATION/TRAINING PROGRAM

## 2025-03-10 RX ORDER — SODIUM CHLORIDE 0.9 % (FLUSH) 0.9 %
20 SYRINGE (ML) INJECTION AS NEEDED
Status: DISCONTINUED | OUTPATIENT
Start: 2025-03-10 | End: 2025-03-11 | Stop reason: HOSPADM

## 2025-03-10 RX ORDER — HEPARIN SODIUM (PORCINE) LOCK FLUSH IV SOLN 100 UNIT/ML 100 UNIT/ML
500 SOLUTION INTRAVENOUS AS NEEDED
Status: DISCONTINUED | OUTPATIENT
Start: 2025-03-10 | End: 2025-03-12 | Stop reason: HOSPADM

## 2025-03-10 RX ORDER — SODIUM CHLORIDE 0.9 % (FLUSH) 0.9 %
20 SYRINGE (ML) INJECTION AS NEEDED
Status: DISCONTINUED | OUTPATIENT
Start: 2025-03-10 | End: 2025-03-12 | Stop reason: HOSPADM

## 2025-03-10 RX ORDER — SODIUM CHLORIDE 0.9 % (FLUSH) 0.9 %
20 SYRINGE (ML) INJECTION AS NEEDED
Status: CANCELLED | OUTPATIENT
Start: 2025-03-10

## 2025-03-10 RX ORDER — HEPARIN SODIUM (PORCINE) LOCK FLUSH IV SOLN 100 UNIT/ML 100 UNIT/ML
500 SOLUTION INTRAVENOUS AS NEEDED
Status: DISCONTINUED | OUTPATIENT
Start: 2025-03-10 | End: 2025-03-11 | Stop reason: HOSPADM

## 2025-03-10 RX ORDER — HEPARIN SODIUM (PORCINE) LOCK FLUSH IV SOLN 100 UNIT/ML 100 UNIT/ML
500 SOLUTION INTRAVENOUS AS NEEDED
Status: CANCELLED | OUTPATIENT
Start: 2025-03-10

## 2025-03-10 RX ORDER — SODIUM CHLORIDE 9 MG/ML
250 INJECTION, SOLUTION INTRAVENOUS ONCE
Status: DISCONTINUED | OUTPATIENT
Start: 2025-03-10 | End: 2025-03-12 | Stop reason: HOSPADM

## 2025-03-10 RX ORDER — SODIUM CHLORIDE 9 MG/ML
250 INJECTION, SOLUTION INTRAVENOUS ONCE
Status: CANCELLED | OUTPATIENT
Start: 2025-03-10

## 2025-03-10 RX ADMIN — HEPARIN 500 UNITS: 100 SYRINGE at 16:18

## 2025-03-10 RX ADMIN — Medication 20 ML: at 16:17

## 2025-03-10 NOTE — PROGRESS NOTES
Minimal blood return from Port access, pt states this is very common for her and that her port is positional and doesn't always give blood return. Labs collected via butterfly stick to right AC

## 2025-03-10 NOTE — PROGRESS NOTES
Pt here for labs only.  Pt prefers venipuncture stick over accessing port.  Labs obtained by left a/c venipuncture.  Pt tolerated well.  Pt d/c home.

## 2025-03-10 NOTE — TELEPHONE ENCOUNTER
Received critical hgb of 6.5 spoke with Dr Sethi patient to receive 1 unit of pRBC     Called and informed patient of her low hgb level and the need for a blood transfusion patient voiced understanding and scheduled for transfusion today

## 2025-03-13 ENCOUNTER — LAB (OUTPATIENT)
Dept: LAB | Facility: HOSPITAL | Age: 70
End: 2025-03-13
Payer: MEDICARE

## 2025-03-13 DIAGNOSIS — C83.31 DIFFUSE LARGE B-CELL LYMPHOMA, LYMPH NODES OF HEAD, FACE, AND NECK: ICD-10-CM

## 2025-03-13 DIAGNOSIS — C85.80 LYMPHOMA MALIGNANT, LARGE CELL: ICD-10-CM

## 2025-03-13 LAB
ALBUMIN SERPL-MCNC: 3.9 G/DL (ref 3.5–5.2)
ALBUMIN/GLOB SERPL: 2.4 G/DL
ALP SERPL-CCNC: 53 U/L (ref 39–117)
ALT SERPL W P-5'-P-CCNC: 37 U/L (ref 1–33)
ANION GAP SERPL CALCULATED.3IONS-SCNC: 13.5 MMOL/L (ref 5–15)
AST SERPL-CCNC: 18 U/L (ref 1–32)
BASOPHILS # BLD AUTO: 0.01 10*3/MM3 (ref 0–0.2)
BASOPHILS NFR BLD AUTO: 0.2 % (ref 0–1.5)
BILIRUB SERPL-MCNC: 1.4 MG/DL (ref 0–1.2)
BUN SERPL-MCNC: 7 MG/DL (ref 8–23)
BUN/CREAT SERPL: 8.5 (ref 7–25)
CALCIUM SPEC-SCNC: 9.2 MG/DL (ref 8.6–10.5)
CHLORIDE SERPL-SCNC: 104 MMOL/L (ref 98–107)
CO2 SERPL-SCNC: 24.5 MMOL/L (ref 22–29)
CREAT SERPL-MCNC: 0.82 MG/DL (ref 0.57–1)
DEPRECATED RDW RBC AUTO: 72.6 FL (ref 37–54)
EGFRCR SERPLBLD CKD-EPI 2021: 77.1 ML/MIN/1.73
EOSINOPHIL # BLD AUTO: 0.01 10*3/MM3 (ref 0–0.4)
EOSINOPHIL NFR BLD AUTO: 0.2 % (ref 0.3–6.2)
ERYTHROCYTE [DISTWIDTH] IN BLOOD BY AUTOMATED COUNT: 20.7 % (ref 12.3–15.4)
GLOBULIN UR ELPH-MCNC: 1.6 GM/DL
GLUCOSE SERPL-MCNC: 133 MG/DL (ref 65–99)
HCT VFR BLD AUTO: 26.7 % (ref 34–46.6)
HGB BLD-MCNC: 8.2 G/DL (ref 12–15.9)
LDH SERPL-CCNC: 318 U/L (ref 135–214)
LYMPHOCYTES # BLD AUTO: 1.01 10*3/MM3 (ref 0.7–3.1)
LYMPHOCYTES NFR BLD AUTO: 24.6 % (ref 19.6–45.3)
MAGNESIUM SERPL-MCNC: 1.8 MG/DL (ref 1.6–2.4)
MCH RBC QN AUTO: 32.8 PG (ref 26.6–33)
MCHC RBC AUTO-ENTMCNC: 30.7 G/DL (ref 31.5–35.7)
MCV RBC AUTO: 106.8 FL (ref 79–97)
MONOCYTES # BLD AUTO: 0.16 10*3/MM3 (ref 0.1–0.9)
MONOCYTES NFR BLD AUTO: 3.9 % (ref 5–12)
NEUTROPHILS NFR BLD AUTO: 2.91 10*3/MM3 (ref 1.7–7)
NEUTROPHILS NFR BLD AUTO: 71.1 % (ref 42.7–76)
PHOSPHATE SERPL-MCNC: 2.4 MG/DL (ref 2.5–4.5)
PLATELET # BLD AUTO: 127 10*3/MM3 (ref 140–450)
PMV BLD AUTO: 8.5 FL (ref 6–12)
POTASSIUM SERPL-SCNC: 3.1 MMOL/L (ref 3.5–5.2)
PROT SERPL-MCNC: 5.5 G/DL (ref 6–8.5)
RBC # BLD AUTO: 2.5 10*6/MM3 (ref 3.77–5.28)
SODIUM SERPL-SCNC: 142 MMOL/L (ref 136–145)
WBC NRBC COR # BLD AUTO: 4.1 10*3/MM3 (ref 3.4–10.8)

## 2025-03-13 PROCEDURE — 84100 ASSAY OF PHOSPHORUS: CPT | Performed by: STUDENT IN AN ORGANIZED HEALTH CARE EDUCATION/TRAINING PROGRAM

## 2025-03-13 PROCEDURE — 80053 COMPREHEN METABOLIC PANEL: CPT | Performed by: STUDENT IN AN ORGANIZED HEALTH CARE EDUCATION/TRAINING PROGRAM

## 2025-03-13 PROCEDURE — 83735 ASSAY OF MAGNESIUM: CPT | Performed by: STUDENT IN AN ORGANIZED HEALTH CARE EDUCATION/TRAINING PROGRAM

## 2025-03-13 PROCEDURE — 36415 COLL VENOUS BLD VENIPUNCTURE: CPT

## 2025-03-13 PROCEDURE — 85025 COMPLETE CBC W/AUTO DIFF WBC: CPT

## 2025-03-13 PROCEDURE — 83615 LACTATE (LD) (LDH) ENZYME: CPT | Performed by: STUDENT IN AN ORGANIZED HEALTH CARE EDUCATION/TRAINING PROGRAM

## 2025-03-17 ENCOUNTER — LAB (OUTPATIENT)
Dept: LAB | Facility: HOSPITAL | Age: 70
End: 2025-03-17
Payer: MEDICARE

## 2025-03-17 ENCOUNTER — HOSPITAL ENCOUNTER (OUTPATIENT)
Dept: ONCOLOGY | Facility: HOSPITAL | Age: 70
Discharge: HOME | End: 2025-03-17
Payer: MEDICARE

## 2025-03-17 DIAGNOSIS — C83.31 DIFFUSE LARGE B-CELL LYMPHOMA, LYMPH NODES OF HEAD, FACE, AND NECK: ICD-10-CM

## 2025-03-17 DIAGNOSIS — Z45.2 ENCOUNTER FOR CARE RELATED TO VASCULAR ACCESS PORT: Primary | ICD-10-CM

## 2025-03-17 LAB
ALBUMIN SERPL-MCNC: 4.1 G/DL (ref 3.5–5.2)
ALBUMIN/GLOB SERPL: 2.9 G/DL
ALP SERPL-CCNC: 49 U/L (ref 39–117)
ALT SERPL W P-5'-P-CCNC: 30 U/L (ref 1–33)
ANION GAP SERPL CALCULATED.3IONS-SCNC: 12.8 MMOL/L (ref 5–15)
AST SERPL-CCNC: 17 U/L (ref 1–32)
BASOPHILS # BLD AUTO: 0 10*3/MM3 (ref 0–0.2)
BASOPHILS NFR BLD AUTO: 0 % (ref 0–1.5)
BILIRUB SERPL-MCNC: 2 MG/DL (ref 0–1.2)
BUN SERPL-MCNC: 12 MG/DL (ref 8–23)
BUN/CREAT SERPL: 15.2 (ref 7–25)
CALCIUM SPEC-SCNC: 9.2 MG/DL (ref 8.6–10.5)
CHLORIDE SERPL-SCNC: 104 MMOL/L (ref 98–107)
CO2 SERPL-SCNC: 25.2 MMOL/L (ref 22–29)
CREAT SERPL-MCNC: 0.79 MG/DL (ref 0.57–1)
DEPRECATED RDW RBC AUTO: 75.5 FL (ref 37–54)
EGFRCR SERPLBLD CKD-EPI 2021: 80.6 ML/MIN/1.73
EOSINOPHIL # BLD AUTO: 0 10*3/MM3 (ref 0–0.4)
EOSINOPHIL NFR BLD AUTO: 0 % (ref 0.3–6.2)
ERYTHROCYTE [DISTWIDTH] IN BLOOD BY AUTOMATED COUNT: 19.6 % (ref 12.3–15.4)
GLOBULIN UR ELPH-MCNC: 1.4 GM/DL
GLUCOSE SERPL-MCNC: 149 MG/DL (ref 65–99)
HCT VFR BLD AUTO: 32.9 % (ref 34–46.6)
HGB BLD-MCNC: 9.4 G/DL (ref 12–15.9)
INR PPP: 1.3 (ref 0.9–1.1)
LYMPHOCYTES # BLD AUTO: 0.82 10*3/MM3 (ref 0.7–3.1)
LYMPHOCYTES NFR BLD AUTO: 17.9 % (ref 19.6–45.3)
MCH RBC QN AUTO: 31.5 PG (ref 26.6–33)
MCHC RBC AUTO-ENTMCNC: 28.6 G/DL (ref 31.5–35.7)
MCV RBC AUTO: 110.4 FL (ref 79–97)
MONOCYTES # BLD AUTO: 0.65 10*3/MM3 (ref 0.1–0.9)
MONOCYTES NFR BLD AUTO: 14.2 % (ref 5–12)
NEUTROPHILS NFR BLD AUTO: 3.12 10*3/MM3 (ref 1.7–7)
NEUTROPHILS NFR BLD AUTO: 67.9 % (ref 42.7–76)
PLATELET # BLD AUTO: 191 10*3/MM3 (ref 140–450)
PMV BLD AUTO: 8.5 FL (ref 6–12)
POTASSIUM SERPL-SCNC: 3.3 MMOL/L (ref 3.5–5.2)
PROT SERPL-MCNC: 5.5 G/DL (ref 6–8.5)
PROTHROMBIN TIME: 16.1 SECONDS (ref 11.7–14.2)
RBC # BLD AUTO: 2.98 10*6/MM3 (ref 3.77–5.28)
SODIUM SERPL-SCNC: 142 MMOL/L (ref 136–145)
WBC NRBC COR # BLD AUTO: 4.59 10*3/MM3 (ref 3.4–10.8)

## 2025-03-17 PROCEDURE — 96523 IRRIG DRUG DELIVERY DEVICE: CPT

## 2025-03-17 PROCEDURE — G0463 HOSPITAL OUTPT CLINIC VISIT: HCPCS

## 2025-03-17 PROCEDURE — 85025 COMPLETE CBC W/AUTO DIFF WBC: CPT | Performed by: STUDENT IN AN ORGANIZED HEALTH CARE EDUCATION/TRAINING PROGRAM

## 2025-03-17 PROCEDURE — 85610 PROTHROMBIN TIME: CPT | Performed by: STUDENT IN AN ORGANIZED HEALTH CARE EDUCATION/TRAINING PROGRAM

## 2025-03-17 PROCEDURE — 80053 COMPREHEN METABOLIC PANEL: CPT | Performed by: STUDENT IN AN ORGANIZED HEALTH CARE EDUCATION/TRAINING PROGRAM

## 2025-03-17 PROCEDURE — 25010000002 HEPARIN LOCK FLUSH PER 10 UNITS: Performed by: STUDENT IN AN ORGANIZED HEALTH CARE EDUCATION/TRAINING PROGRAM

## 2025-03-17 RX ORDER — HEPARIN SODIUM (PORCINE) LOCK FLUSH IV SOLN 100 UNIT/ML 100 UNIT/ML
500 SOLUTION INTRAVENOUS AS NEEDED
Status: DISCONTINUED | OUTPATIENT
Start: 2025-03-17 | End: 2025-03-18 | Stop reason: HOSPADM

## 2025-03-17 RX ORDER — SODIUM CHLORIDE 0.9 % (FLUSH) 0.9 %
20 SYRINGE (ML) INJECTION AS NEEDED
Status: DISCONTINUED | OUTPATIENT
Start: 2025-03-17 | End: 2025-03-18 | Stop reason: HOSPADM

## 2025-03-17 RX ORDER — SODIUM CHLORIDE 0.9 % (FLUSH) 0.9 %
20 SYRINGE (ML) INJECTION AS NEEDED
Status: CANCELLED | OUTPATIENT
Start: 2025-03-17

## 2025-03-17 RX ORDER — HEPARIN SODIUM (PORCINE) LOCK FLUSH IV SOLN 100 UNIT/ML 100 UNIT/ML
500 SOLUTION INTRAVENOUS AS NEEDED
Status: CANCELLED | OUTPATIENT
Start: 2025-03-17

## 2025-03-17 RX ADMIN — Medication 20 ML: at 10:13

## 2025-03-17 RX ADMIN — HEPARIN 500 UNITS: 100 SYRINGE at 10:13

## 2025-03-17 NOTE — PROGRESS NOTES
Pt here for port labs only. Port accessed and flushed with very little blood return noted. Pt states they usually have a hard time getting blood from her port. Labs were unable to be collected from pt's port. Port flushes without any resistant or pain. Port flushed with saline and heparin prior to needle removal. Pt sent to lab for blood specimens to be collected peripherally.

## 2025-03-18 ENCOUNTER — HOSPITAL ENCOUNTER (OUTPATIENT)
Dept: PET IMAGING | Facility: HOSPITAL | Age: 70
Discharge: HOME | End: 2025-03-18
Payer: MEDICARE

## 2025-03-18 DIAGNOSIS — C83.31 DIFFUSE LARGE B-CELL LYMPHOMA, LYMPH NODES OF HEAD, FACE, AND NECK: ICD-10-CM

## 2025-03-18 LAB — GLUCOSE BLDC GLUCOMTR-MCNC: 106 MG/DL (ref 70–105)

## 2025-03-18 PROCEDURE — 78815 PET IMAGE W/CT SKULL-THIGH: CPT

## 2025-03-18 PROCEDURE — 82948 REAGENT STRIP/BLOOD GLUCOSE: CPT

## 2025-03-18 PROCEDURE — 34310000005 FLUDEOXYGLUCOSE F18 SOLUTION: Performed by: STUDENT IN AN ORGANIZED HEALTH CARE EDUCATION/TRAINING PROGRAM

## 2025-03-18 PROCEDURE — A9552 F18 FDG: HCPCS | Performed by: STUDENT IN AN ORGANIZED HEALTH CARE EDUCATION/TRAINING PROGRAM

## 2025-03-18 RX ORDER — POTASSIUM CHLORIDE 1500 MG/1
20 TABLET, EXTENDED RELEASE ORAL DAILY
Qty: 5 TABLET | Refills: 0 | Status: SHIPPED | OUTPATIENT
Start: 2025-03-18 | End: 2025-03-19 | Stop reason: SDUPTHER

## 2025-03-18 RX ADMIN — FLUDEOXYGLUCOSE F 18 1 DOSE: 200 INJECTION, SOLUTION INTRAVENOUS at 09:08

## 2025-03-19 RX ORDER — POTASSIUM CHLORIDE 1500 MG/1
20 TABLET, EXTENDED RELEASE ORAL DAILY
Qty: 5 TABLET | Refills: 0 | Status: SHIPPED | OUTPATIENT
Start: 2025-03-19

## 2025-03-20 RX ORDER — POTASSIUM CHLORIDE 1500 MG/1
20 TABLET, EXTENDED RELEASE ORAL DAILY
Qty: 5 TABLET | Refills: 0 | OUTPATIENT
Start: 2025-03-20

## 2025-03-20 NOTE — PROGRESS NOTES
HEMATOLOGY ONCOLOGY OUTPATIENT FOLLOW UP       Patient name: Catarina Mclean  : 1955  MRN: [id number]  Primary Care Physician: Bozena Alamo NP  Referring Physician: Bozena Alamo NP  Reason For Consult:       History of Present Illness:  Patient is a 69-year-old female who has been referred to us for further evaluation and management of diffuse lymphadenopathy.    She reported having symptoms of worsening fatigue, poor appetite intermittent night sweats and palpable axillary adenopathy approximately 2 months ago.  She was seen by her PCP for the symptoms and was empirically treated with oral antibiotics with limited improvement in her symptoms.      Bilateral breast screening mammogram in 2024 was reported unremarkable.  [BI-RADS 1]    Subsequently a CT chest abdomen pelvis were ordered and is reported as follows:  2024: CT chest/abdomen/pelvis:  Impression:  1.Supra diaphragmatic and infra diaphragmatic lymphadenopathy concerning for lymphoma.     Her past medical history significant for non-Hodgkin lymphoma [likely DLBCL], diagnosed in -10  She reported that she was being followed by Dr. Barkley and had systemic therapy for lymphoma in [id number].  She did not follow-up with oncologist thereafter and was lost to follow-up.  Patient is unable to provide any additional details about her diagnosis or treatment.    Limited prior medical records from  were reviewed:    Patient underwent bilateral tonsillectomy in 2009,  Pathology: Left tonsil was reported to have involvement from malignant lymphoma, large cell type of B-cell origin.  [Of activated B-cell phenotype].  IHC staining was reported positive for CD45, CD20, Bcl-2 and MUM1 with Ki-67 98%.  The tumor was negative for BEV.    A bone marrow biopsy was reported negative for lymphoma involvement at that time.    Patient stated that she is up-to-date whether age-appropriate cancer screening  including annual screening mammograms, Pap smears and colonoscopies.  Last colonoscopy was approximately 10 years ago and was reported normal per patient.    Family history significant for unknown cancer in brother who has been recently diagnosed..      Patient presents for initial consultation today.  She reported having symptoms of generalized fatigue, poor appetite and palpable axillary adenopathy in right axilla and right supraclavicular area for last few weeks.  Denied any other respiratory or GI/ symptoms.  She reported having occasional night sweats but denied any weight loss or fever/chills.  No recent infections reported.    11/20/24: Lymph node, right axillary:  Large B-cell lymphoma  Immunohistochemistry  There is predominance of intermediate to large sized CD45 and CD20+ B-lymphocytes. CD3 and CD5 reveal numerous small T-lymphocytes. B-cells are negative for CD5, CD10, CD15, CD30, and BEV. They are positive for PAX-5, BCL-2, BCL-6, and MUM-1, as well as c-MYC (~40% of neoplastic cells). Ki-67 shows high proliferative rate (50-60%).    Flow Cytometry: Abnormal/ monotypic B-cell population (4% of sample)  Comment: Scatter properties compatible with increased cell size, cells characterized as:  CD45+, CD19+, CD20+, CD5-, CD10-, CD23-, FMC7-, CD30-, CD38-, CD43-/+, HLA  DR+, sIg lambda+    FISH PANEL: Abnormal Lymphoma FISH panel  Aneuploidy: gain of BCL6/3q, MYC/8q, and BCL2/18q  Additional IGH/14q    12/3/24: PET/CT:  Impression:  1. Hypermetabolic left cervical chain, bilateral supraclavicular, right axillary, right subpectoral, portacaval and retroperitoneal adenopathy consistent with known lymphoma.  2. Two small hypermetabolic splenic lesions likely also related to lymphoma. No splenomegaly.  3. Hypermetabolic osseous uptake associated with C3 vertebral body and right posterior 12th rib likely osseous involvement of lymphoma.  4. Additional incidental CT findings above.    12/10/24: Patient seen  today for follow-up visit to discuss her biopsy and imaging results.  She reported again having ongoing fatigue and decreased appetite.  Denied any new complaints today.    12/16/2024: Transthoracic echocardiogram:    Left ventricular ejection fraction appears to be 61 - 65%.    Left ventricular diastolic function is consistent with (grade Ia w/high LAP) impaired relaxation.    The left atrial cavity is dilated.    Estimated right ventricular systolic pressure from tricuspid regurgitation is normal (<35 mmHg).    No significant valvular abnormalities noted.    Extracardiac findings: Periportal lymph nodes are noted.      12/23/2024: Patient seen today for follow-up.  Has petechial rash on her chest and extremities which is new.  Has some ongoing fatigue but otherwise stable    12/25/24- 12/2724:   The patient was admitted to [facility] hospital with complaint of rash which began on face and spread to the chest. She was also noted to have thrombocytopenia with Plt count jacqueline at 58K. She was started on steroids and Atarax with improvement of rash and plt counts. The patient was discharged home on Medrol dose pack as well as Benadryl as needed in view of the rash.     1/10/25: Patient seen today for follow-up visit.  She reported clinically doing better, reported improved appetite and good energy levels today.  She was on tapering steroids since her hospitalization which she has completed approximately 4-5 days ago.  No new complaints reported.  Stated that her palpable supraclavicular and right axillary adenopathy has improved.  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    1/21/25: Patient seen today for follow-up visit.  Clinically doing well this morning, reportedly she had a severe allergic reaction to Bactrim recently for which she was seen at [facility] ER on 1/14/2025 after  she accidentally took Bactrim although this was discontinued previously.  She has recovered from her acute symptoms and is near her baseline today.  Reported good energy levels and appetite    1/21/25: C1D1 R-CEOP with IT Methotrexate    2/11/2025: Marky is here today for her routine follow-up and evaluation for readiness for cycle 2 of treatment.  She reports that she has been doing very well.  Denies any side effects of treatment outside of hair loss.  Reports that she was seen at [facility] for evaluation for transplant but that due to her excellent response to treatment it is not recommended at this time.  She reports that she is no longer able to feel the right supraclavicular or right axillary lymph node.    2/11/25: C2D1 R-CEOP with IT Methotrexate    3/4/25: Seen today for follow-up prior to cycle 3 chemotherapy.  Appears to have good tolerance so far except for progressive fatigue.  No other side effects reported.  Weight/appetite is stable.  Her supraclavicular and axillary lymphadenopathy has significantly improved.  Compliant with antiviral and PCP prophylaxis.  No signs/symptoms concerning for infection.    3/6/25: C3D1 R-CEOP with IT Methotrexate    3/20/25: NM PET/CT:  IMPRESSION:  Interval complete response to therapy since 12/3/2024. No residual soft tissue mass or adenopathy. No abnormal hypermetabolic activity.    History of Present Illness  3/24/25: The patient presents for follow up visit and to discuss restaging PET/CT.    She reports no complications following her most recent chemotherapy session, except for the need for a blood transfusion. She is not experiencing any night sweats. She is currently on antimicrobial prophylaxis and allopurinol with good compliance. She is scheduled for her next treatment tomorrow.    She expresses concern about potential ocular side effects, noting that her pupils appear constricted and has teary eyes most of the daytime. She also has sensitivity to bright light.   She has been utilizing eyedrops for symptom management. She has known history of allergies.    She has received epidural injections for pain management, with the first two sessions being uneventful. However, she experienced discomfort during the third session.     ALLERGIES  The patient has no known allergies.    MEDICATIONS  Current: allopurinol, acyclovir        Past Medical History:   Diagnosis Date    Drug therapy     Hyperlipidemia     Hypertension     Non Hodgkin's lymphoma 2009       Past Surgical History:   Procedure Laterality Date    BREAST BIOPSY      HYSTERECTOMY           Current Outpatient Medications:     acyclovir (ZOVIRAX) 400 MG tablet, Take 1 tablet by mouth 2 (Two) Times a Day. Take no more than 5 doses a day., Disp: 60 tablet, Rfl: 5    allopurinol (ZYLOPRIM) 300 MG tablet, Take 1 tablet by mouth Daily., Disp: 30 tablet, Rfl: 0    amLODIPine (NORVASC) 5 MG tablet, Take 1 tablet by mouth Daily., Disp: , Rfl:     aspirin 81 MG chewable tablet, Chew Daily., Disp: , Rfl:     carvedilol (COREG) 6.25 MG tablet, Take 1 tablet by mouth 2 (Two) Times a Day., Disp: 180 tablet, Rfl: 3    dapsone 100 MG tablet, Take 1 tablet by mouth Daily., Disp: 30 tablet, Rfl: 1    fluconazole (DIFLUCAN) 150 MG tablet, , Disp: , Rfl:     lidocaine-prilocaine (EMLA) 2.5-2.5 % cream, Apply 1 Application topically to the appropriate area as directed As Needed (apply 1 hour prior to port access)., Disp: 30 g, Rfl: 2    lisinopril (PRINIVIL,ZESTRIL) 20 MG tablet, Take 1 tablet by mouth Daily., Disp: , Rfl:     Omega-3 Fatty Acids (fish oil) 1000 MG capsule capsule, Take 2 capsules by mouth Daily With Breakfast., Disp: , Rfl:     ondansetron (ZOFRAN) 8 MG tablet, Take 1 tablet by mouth 3 (Three) Times a Day As Needed for Nausea or Vomiting., Disp: 30 tablet, Rfl: 5    potassium chloride (KLOR-CON M20) 20 MEQ CR tablet, Take 1 tablet by mouth Daily., Disp: 5 tablet, Rfl: 0    pravastatin (PRAVACHOL) 20 MG tablet, Take 1 tablet  by mouth Daily., Disp: , Rfl:     predniSONE (DELTASONE) 50 MG tablet, Take 2 tablets by mouth Daily. Take with food.  Bring the first dose to chemotherapy appointment., Disp: 10 tablet, Rfl: 5    traZODone (DESYREL) 50 MG tablet, , Disp: , Rfl:     Allergies   Allergen Reactions    Sulfamethoxazole-Trimethoprim Swelling       Family History   Problem Relation Age of Onset    Liver cancer Brother     Breast cancer Maternal Aunt        Cancer-related family history includes Breast cancer in her maternal aunt; Liver cancer in her brother.      Social History     Tobacco Use    Smoking status: Never     Passive exposure: Never    Smokeless tobacco: Never   Vaping Use    Vaping status: Never Used   Substance Use Topics    Alcohol use: Yes     Alcohol/week: 10.0 standard drinks of alcohol     Types: 10 Cans of beer per week     Comment: WEEKLY    Drug use: Never     Social History     Social History Narrative    Not on file       ROS:   Review of Systems   Constitutional:  Positive for fatigue.   HENT: Negative.     Eyes: Negative.    Respiratory: Negative.     Cardiovascular: Negative.    Gastrointestinal: Negative.    Endocrine: Negative.    Genitourinary: Negative.    Musculoskeletal: Negative.    Skin: Negative.    Allergic/Immunologic: Negative.    Neurological: Negative.    Hematological: Negative.    Psychiatric/Behavioral: Negative.           Objective:    Vital Signs:  There were no vitals filed for this visit.            There is no height or weight on file to calculate BMI.    ECOG  (1) Restricted in physically strenuous activity, ambulatory and able to do work of light nature    Physical Exam:   Physical Exam  Constitutional:       General: She is not in acute distress.     Appearance: Normal appearance. She is normal weight.   HENT:      Head: Normocephalic and atraumatic.      Right Ear: External ear normal.      Left Ear: External ear normal.      Nose: Nose normal.      Mouth/Throat:      Mouth: Mucous  membranes are moist.      Pharynx: Oropharynx is clear.   Eyes:      Extraocular Movements: Extraocular movements intact.      Conjunctiva/sclera: Conjunctivae normal.      Pupils: Pupils are equal, round, and reactive to light.   Cardiovascular:      Rate and Rhythm: Normal rate.      Pulses: Normal pulses.   Pulmonary:      Effort: Pulmonary effort is normal. No respiratory distress.   Abdominal:      General: Abdomen is flat.      Palpations: Abdomen is soft.   Musculoskeletal:         General: Normal range of motion.      Cervical back: Normal range of motion and neck supple.   Skin:     General: Skin is warm.   Neurological:      General: No focal deficit present.      Mental Status: She is alert and oriented to person, place, and time.   Psychiatric:         Mood and Affect: Mood normal.         Behavior: Behavior normal.         Thought Content: Thought content normal.         Judgment: Judgment normal.         Lab Results - Last 18 Months   Lab Units 03/17/25  1028 03/13/25  0826 03/10/25  1238   WBC 10*3/mm3 4.59 4.10 6.86   HEMOGLOBIN g/dL 9.4* 8.2* 6.3*   HEMATOCRIT % 32.9* 26.7* 21.8*   PLATELETS 10*3/mm3 191 127* 111*   MCV fL 110.4* 106.8* 112.4*     Lab Results - Last 18 Months   Lab Units 03/17/25  1028 03/13/25  0826 03/10/25  1022   SODIUM mmol/L 142 142 138   POTASSIUM mmol/L 3.3* 3.1* 4.0   CHLORIDE mmol/L 104 104 104   CO2 mmol/L 25.2 24.5 20.8*   BUN mg/dL 12 7* 17   CREATININE mg/dL 0.79 0.82 0.81   CALCIUM mg/dL 9.2 9.2 9.0   BILIRUBIN mg/dL 2.0* 1.4* 1.3*   ALK PHOS U/L 49 53 51   ALT (SGPT) U/L 30 37* 59*   AST (SGOT) U/L 17 18 31   GLUCOSE mg/dL 149* 133* 129*       Lab Results   Component Value Date    GLUCOSE 149 (H) 03/17/2025    BUN 12 03/17/2025    CREATININE 0.79 03/17/2025    BCR 15.2 03/17/2025    K 3.3 (L) 03/17/2025    CO2 25.2 03/17/2025    CALCIUM 9.2 03/17/2025    ALBUMIN 4.1 03/17/2025    AST 17 03/17/2025    ALT 30 03/17/2025       Lab Results - Last 18 Months   Lab Units  "03/17/25  1028 03/07/25  1124 02/14/25  1141 02/03/25  1037 01/24/25  1122   INR  1.30* 1.12* 1.10   < > 1.11*   APTT seconds  --  28.3 20.3*  --  32.3    < > = values in this interval not displayed.       No results found for: \"IRON\", \"TIBC\", \"FERRITIN\"    No results found for: \"FOLATE\"    No results found for: \"OCCULTBLD\"    No results found for: \"RETICCTPCT\"  No results found for: \"CEXHHVGS71\"  No results found for: \"SPEP\", \"UPEP\"  LDH   Date Value Ref Range Status   03/13/2025 318 (H) 135 - 214 U/L Final     Uric Acid   Date Value Ref Range Status   03/03/2025 2.5 2.4 - 5.7 mg/dL Final     Lab Results   Component Value Date    OTTONIEL Positive (A) 11/20/2024    SEDRATE 4 12/10/2024     No results found for: \"FIBRINOGEN\", \"HAPTOGLOBIN\"  Lab Results   Component Value Date    PTT 28.3 03/07/2025    INR 1.30 (H) 03/17/2025     No results found for: \"\"  No results found for: \"CEA\"  No components found for: \"CA-19-9\"  No results found for: \"PSA\"  Lab Results   Component Value Date    SEDRATE 4 12/10/2024          Assessment & Plan :  Assessment & Plan        Generalized lymphadenopathy:  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  Patient has remote history of aggressive NHL, status post chemotherapy in 2009-10.  -CT chest/abdomen/pelvis reviewed, noted to have extensive lymphadenopathy involving supraclavicular, axillary and intra-abdominal lymph nodes.  No solid organ masses noted concerning for solid organ metastasis.  -Axillary lymph node biopsy and PET/CT findings reviewed as above.  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  Ki-67 is elevated at 50-60%  -I reviewed these findings with patient in detail.  Given extensive disease and aggressive disease biology, she was initially being " considered for dose adjusted R-EPOCH regimen, I also discussed her case with [facility] BMT attending Dr. Zuleta. Due to concern about prior exposure to anthracyclines during at time of her initial diagnosis and treatment in 2009-10, repeat treatment with anthracycline based regimen may carry risk of significant cardiotoxicity.  She has been since evaluated at [facility] BMT clinic by Dr. Hirsch who recommended to start patient on R-CEOP regimen for total 6 cycles replacing Doxorubicin with Etoposide.  -This plan was discussed in detail with patient today.  Potential treatment related adverse effects were explained to her.  She verbalized understanding and is agreeable to treatment.   -She has completed 2 cycles of R-CEOP with good tolerance so far, however has somewhat progressive fatigue.  -Labs reviewed, noted to have elevated serum bilirubin levels, dose reduced vincristine by 50% with cycle 3.has good tolerance.  -interval PET scan results indicate a complete response to the ongoing treatment regimen, with no residual lymphoma detected at this stage.    CNS prophylaxis:    The patient will need CNS prophylaxis  with IT methotrexate with systemic therapy given high risk for CNS involvement [CNS IPI 4, associated with high risk of CNS involvement].  -patient is scheduled for IT Chemotherapy with methotrexate with each cycle.      Cardiac health: Detailed medical records pertaining to her prior cancer treatment are not available, however it seems she had outpatient chemotherapy with R-CHOP or similar regimen containing anthracyclines..  Will try to obtain these records but there is significant concern about patient crossing the maximum recommended lifetime dose for anthracyclines with her planned lymphoma treatment.  -Discussed her case with cardiology [Dr. García].  She was referred to cardiology clinic to discuss cardiac risk reduction and to start prophylactic treatment.   -Initial echocardiogram reported normal with EF  61-65%  She has been started on Coreg and Jardiance.   -Patient has been started on an anthracycline free regimen as above      TLS prophylaxis: Started patient on allopurinol due to bulky disease and risk of TLS.  Will continue throughout treatment.  Reviewed with the patient.  Continue to monitor biweekly labs    ID: Started patient on antiviral and PCP prophylaxis with acyclovir and Bactrim. Bactrim has been held due to concern about drug induced rash and thrombocytopenia.  Will start patient on Daily dapsone [atovaquone not covered by insurance].  Reviewed with the patient.    Cytopenias: Monitor biweekly CBC and CMP and transfuse PRN to maintain Hb >7.5 and Plt count >10K. She required 1 unit RBC transfusion last week after cycle 3 chemotherapy    Skin Rash:   Thrombocytopenia:  This has improved. Patient is s/p completion of tapering steroids.   Persistent thrombocytopenia could be secondary to accidental use of bactrim recently.  Resolved      Bactrim allergy : Patient has had severe symptoms following accidentally taking Bactrim recently requiring her to be seen in ER, she was treated with IV Solu-Medrol, Benadryl and Pepcid.  Continue to hold off on Bactrim moving forward.  Patient was started on Dapsone.  G6PD levels were reported normal      Eye issues.  The patient's ocular symptoms, including watery eyes and sensitivity to bright light, are unlikely to be a side effect of the current medication regimen. These symptoms could potentially be attributed to seasonal allergies. The patient is advised to continue using topical lubricating eyedrops for relief.      Leukocytosis: Secondary to G-CSF and steroids.  Continue to monitor    3-week follow-up with scheduled treatment,, sooner as needed    Thank you very much for providing the opportunity to participate in this patient’s care. Please do not hesitate to call if there are any other questions.

## 2025-03-24 ENCOUNTER — OFFICE VISIT (OUTPATIENT)
Dept: ONCOLOGY | Facility: CLINIC | Age: 70
End: 2025-03-24
Payer: MEDICARE

## 2025-03-24 ENCOUNTER — HOSPITAL ENCOUNTER (OUTPATIENT)
Dept: ONCOLOGY | Facility: HOSPITAL | Age: 70
Discharge: HOME | End: 2025-03-24
Admitting: STUDENT IN AN ORGANIZED HEALTH CARE EDUCATION/TRAINING PROGRAM
Payer: MEDICARE

## 2025-03-24 VITALS
OXYGEN SATURATION: 91 % | HEIGHT: 62 IN | SYSTOLIC BLOOD PRESSURE: 187 MMHG | BODY MASS INDEX: 30.25 KG/M2 | DIASTOLIC BLOOD PRESSURE: 99 MMHG | HEART RATE: 60 BPM | WEIGHT: 164.4 LBS

## 2025-03-24 DIAGNOSIS — R59.1 LYMPHADENOPATHY: ICD-10-CM

## 2025-03-24 DIAGNOSIS — Z45.2 ENCOUNTER FOR CARE RELATED TO VASCULAR ACCESS PORT: ICD-10-CM

## 2025-03-24 DIAGNOSIS — C85.80 LYMPHOMA MALIGNANT, LARGE CELL: Primary | ICD-10-CM

## 2025-03-24 DIAGNOSIS — Z85.72 HISTORY OF NON-HODGKIN'S LYMPHOMA: ICD-10-CM

## 2025-03-24 DIAGNOSIS — Z51.11 ENCOUNTER FOR ANTINEOPLASTIC CHEMOTHERAPY: ICD-10-CM

## 2025-03-24 LAB
ALBUMIN SERPL-MCNC: 4.2 G/DL (ref 3.5–5.2)
ALBUMIN/GLOB SERPL: 3 G/DL
ALP SERPL-CCNC: 49 U/L (ref 39–117)
ALT SERPL W P-5'-P-CCNC: 34 U/L (ref 1–33)
ANION GAP SERPL CALCULATED.3IONS-SCNC: 10.9 MMOL/L (ref 5–15)
AST SERPL-CCNC: 19 U/L (ref 1–32)
BASOPHILS # BLD AUTO: 0.01 10*3/MM3 (ref 0–0.2)
BASOPHILS NFR BLD AUTO: 0.1 % (ref 0–1.5)
BILIRUB SERPL-MCNC: 2.3 MG/DL (ref 0–1.2)
BUN SERPL-MCNC: 12 MG/DL (ref 8–23)
BUN/CREAT SERPL: 13.8 (ref 7–25)
CALCIUM SPEC-SCNC: 9.5 MG/DL (ref 8.6–10.5)
CHLORIDE SERPL-SCNC: 103 MMOL/L (ref 98–107)
CO2 SERPL-SCNC: 25.1 MMOL/L (ref 22–29)
CREAT SERPL-MCNC: 0.87 MG/DL (ref 0.57–1)
DEPRECATED RDW RBC AUTO: 65.1 FL (ref 37–54)
EGFRCR SERPLBLD CKD-EPI 2021: 71.8 ML/MIN/1.73
EOSINOPHIL # BLD AUTO: 0 10*3/MM3 (ref 0–0.4)
EOSINOPHIL NFR BLD AUTO: 0 % (ref 0.3–6.2)
ERYTHROCYTE [DISTWIDTH] IN BLOOD BY AUTOMATED COUNT: 17.2 % (ref 12.3–15.4)
GLOBULIN UR ELPH-MCNC: 1.4 GM/DL
GLUCOSE SERPL-MCNC: 160 MG/DL (ref 65–99)
HCT VFR BLD AUTO: 39.7 % (ref 34–46.6)
HGB BLD-MCNC: 11.7 G/DL (ref 12–15.9)
LYMPHOCYTES # BLD AUTO: 1.36 10*3/MM3 (ref 0.7–3.1)
LYMPHOCYTES NFR BLD AUTO: 20.2 % (ref 19.6–45.3)
MCH RBC QN AUTO: 31.2 PG (ref 26.6–33)
MCHC RBC AUTO-ENTMCNC: 29.5 G/DL (ref 31.5–35.7)
MCV RBC AUTO: 105.9 FL (ref 79–97)
MONOCYTES # BLD AUTO: 1.49 10*3/MM3 (ref 0.1–0.9)
MONOCYTES NFR BLD AUTO: 22.1 % (ref 5–12)
NEUTROPHILS NFR BLD AUTO: 3.87 10*3/MM3 (ref 1.7–7)
NEUTROPHILS NFR BLD AUTO: 57.6 % (ref 42.7–76)
PLATELET # BLD AUTO: 251 10*3/MM3 (ref 140–450)
PMV BLD AUTO: 8.3 FL (ref 6–12)
POTASSIUM SERPL-SCNC: 3.7 MMOL/L (ref 3.5–5.2)
PROT SERPL-MCNC: 5.6 G/DL (ref 6–8.5)
RBC # BLD AUTO: 3.75 10*6/MM3 (ref 3.77–5.28)
SODIUM SERPL-SCNC: 139 MMOL/L (ref 136–145)
WBC NRBC COR # BLD AUTO: 6.73 10*3/MM3 (ref 3.4–10.8)

## 2025-03-24 PROCEDURE — 80053 COMPREHEN METABOLIC PANEL: CPT | Performed by: STUDENT IN AN ORGANIZED HEALTH CARE EDUCATION/TRAINING PROGRAM

## 2025-03-24 PROCEDURE — 1126F AMNT PAIN NOTED NONE PRSNT: CPT | Performed by: STUDENT IN AN ORGANIZED HEALTH CARE EDUCATION/TRAINING PROGRAM

## 2025-03-24 PROCEDURE — 85025 COMPLETE CBC W/AUTO DIFF WBC: CPT | Performed by: STUDENT IN AN ORGANIZED HEALTH CARE EDUCATION/TRAINING PROGRAM

## 2025-03-24 PROCEDURE — 99214 OFFICE O/P EST MOD 30 MIN: CPT | Performed by: STUDENT IN AN ORGANIZED HEALTH CARE EDUCATION/TRAINING PROGRAM

## 2025-03-24 PROCEDURE — 36415 COLL VENOUS BLD VENIPUNCTURE: CPT

## 2025-03-24 RX ORDER — HEPARIN SODIUM (PORCINE) LOCK FLUSH IV SOLN 100 UNIT/ML 100 UNIT/ML
500 SOLUTION INTRAVENOUS AS NEEDED
Status: CANCELLED | OUTPATIENT
Start: 2025-03-24

## 2025-03-24 RX ORDER — SODIUM CHLORIDE 9 MG/ML
20 INJECTION, SOLUTION INTRAVENOUS ONCE
Status: CANCELLED | OUTPATIENT
Start: 2025-03-25

## 2025-03-24 RX ORDER — PALONOSETRON 0.05 MG/ML
0.25 INJECTION, SOLUTION INTRAVENOUS ONCE
Status: CANCELLED | OUTPATIENT
Start: 2025-03-25

## 2025-03-24 RX ORDER — SODIUM CHLORIDE 0.9 % (FLUSH) 0.9 %
20 SYRINGE (ML) INJECTION AS NEEDED
Status: CANCELLED | OUTPATIENT
Start: 2025-03-24

## 2025-03-24 RX ORDER — SODIUM CHLORIDE 0.9 % (FLUSH) 0.9 %
20 SYRINGE (ML) INJECTION AS NEEDED
Status: DISCONTINUED | OUTPATIENT
Start: 2025-03-24 | End: 2025-03-25 | Stop reason: HOSPADM

## 2025-03-24 RX ORDER — SODIUM CHLORIDE 9 MG/ML
20 INJECTION, SOLUTION INTRAVENOUS ONCE
Status: CANCELLED | OUTPATIENT
Start: 2025-03-27

## 2025-03-24 RX ORDER — DAPSONE 100 MG/1
100 TABLET ORAL DAILY
Qty: 30 TABLET | Refills: 3 | Status: SHIPPED | OUTPATIENT
Start: 2025-03-24

## 2025-03-24 RX ORDER — FAMOTIDINE 10 MG/ML
20 INJECTION, SOLUTION INTRAVENOUS AS NEEDED
Status: CANCELLED | OUTPATIENT
Start: 2025-03-25

## 2025-03-24 RX ORDER — HYDROCORTISONE SODIUM SUCCINATE 100 MG/2ML
100 INJECTION INTRAMUSCULAR; INTRAVENOUS AS NEEDED
Status: CANCELLED | OUTPATIENT
Start: 2025-03-25

## 2025-03-24 RX ORDER — MEPERIDINE HYDROCHLORIDE 25 MG/ML
25 INJECTION INTRAMUSCULAR; INTRAVENOUS; SUBCUTANEOUS
Status: CANCELLED | OUTPATIENT
Start: 2025-03-25

## 2025-03-24 RX ORDER — HEPARIN SODIUM (PORCINE) LOCK FLUSH IV SOLN 100 UNIT/ML 100 UNIT/ML
500 SOLUTION INTRAVENOUS AS NEEDED
Status: DISCONTINUED | OUTPATIENT
Start: 2025-03-24 | End: 2025-03-25 | Stop reason: HOSPADM

## 2025-03-24 RX ORDER — ACETAMINOPHEN 325 MG/1
650 TABLET ORAL ONCE
Status: CANCELLED | OUTPATIENT
Start: 2025-03-25

## 2025-03-24 RX ORDER — POTASSIUM CHLORIDE 1500 MG/1
20 TABLET, EXTENDED RELEASE ORAL DAILY
Qty: 5 TABLET | Refills: 0 | OUTPATIENT
Start: 2025-03-24

## 2025-03-24 RX ORDER — DIPHENHYDRAMINE HYDROCHLORIDE 50 MG/ML
50 INJECTION, SOLUTION INTRAMUSCULAR; INTRAVENOUS AS NEEDED
Status: CANCELLED | OUTPATIENT
Start: 2025-03-25

## 2025-03-24 RX ORDER — SODIUM CHLORIDE 9 MG/ML
20 INJECTION, SOLUTION INTRAVENOUS ONCE
Status: CANCELLED | OUTPATIENT
Start: 2025-03-26

## 2025-03-24 NOTE — PROGRESS NOTES
Declined port flush today, 23 gauge butterfly to right AC obtained cbc, cmp. Gauze and bandaid applied to insertion site. Tolerated without difficulty.

## 2025-03-25 ENCOUNTER — HOSPITAL ENCOUNTER (OUTPATIENT)
Dept: ONCOLOGY | Facility: HOSPITAL | Age: 70
Discharge: HOME | End: 2025-03-25
Payer: MEDICARE

## 2025-03-25 VITALS
HEART RATE: 77 BPM | DIASTOLIC BLOOD PRESSURE: 78 MMHG | RESPIRATION RATE: 16 BRPM | TEMPERATURE: 97.5 F | HEIGHT: 62 IN | BODY MASS INDEX: 30.57 KG/M2 | SYSTOLIC BLOOD PRESSURE: 144 MMHG | OXYGEN SATURATION: 91 % | WEIGHT: 166.1 LBS

## 2025-03-25 DIAGNOSIS — C83.31 DIFFUSE LARGE B-CELL LYMPHOMA, LYMPH NODES OF HEAD, FACE, AND NECK: ICD-10-CM

## 2025-03-25 DIAGNOSIS — Z45.2 ENCOUNTER FOR CARE RELATED TO VASCULAR ACCESS PORT: Primary | ICD-10-CM

## 2025-03-25 DIAGNOSIS — C85.80 LYMPHOMA MALIGNANT, LARGE CELL: ICD-10-CM

## 2025-03-25 PROCEDURE — 25010000002 RITUXIMAB 10 MG/ML SOLUTION 50 ML VIAL: Performed by: STUDENT IN AN ORGANIZED HEALTH CARE EDUCATION/TRAINING PROGRAM

## 2025-03-25 PROCEDURE — 25010000002 ETOPOSIDE 1 GM/50ML SOLUTION 50 ML VIAL: Performed by: STUDENT IN AN ORGANIZED HEALTH CARE EDUCATION/TRAINING PROGRAM

## 2025-03-25 PROCEDURE — 25810000003 SODIUM CHLORIDE 0.9 % SOLUTION 250 ML FLEX CONT: Performed by: STUDENT IN AN ORGANIZED HEALTH CARE EDUCATION/TRAINING PROGRAM

## 2025-03-25 PROCEDURE — 25010000002 RITUXIMAB 10 MG/ML SOLUTION 10 ML VIAL: Performed by: STUDENT IN AN ORGANIZED HEALTH CARE EDUCATION/TRAINING PROGRAM

## 2025-03-25 PROCEDURE — 25010000002 CYCLOPHOSPHAMIDE 2 GM/10ML SOLUTION 10 ML VIAL: Performed by: STUDENT IN AN ORGANIZED HEALTH CARE EDUCATION/TRAINING PROGRAM

## 2025-03-25 PROCEDURE — 96375 TX/PRO/DX INJ NEW DRUG ADDON: CPT

## 2025-03-25 PROCEDURE — 96411 CHEMO IV PUSH ADDL DRUG: CPT

## 2025-03-25 PROCEDURE — 96415 CHEMO IV INFUSION ADDL HR: CPT

## 2025-03-25 PROCEDURE — 96413 CHEMO IV INFUSION 1 HR: CPT

## 2025-03-25 PROCEDURE — 25810000003 SODIUM CHLORIDE 0.9 % SOLUTION: Performed by: STUDENT IN AN ORGANIZED HEALTH CARE EDUCATION/TRAINING PROGRAM

## 2025-03-25 PROCEDURE — 25010000002 PALONOSETRON PER 25 MCG: Performed by: STUDENT IN AN ORGANIZED HEALTH CARE EDUCATION/TRAINING PROGRAM

## 2025-03-25 PROCEDURE — 63710000001 ACETAMINOPHEN 325 MG TABLET: Performed by: STUDENT IN AN ORGANIZED HEALTH CARE EDUCATION/TRAINING PROGRAM

## 2025-03-25 PROCEDURE — 25010000002 VINCRISTINE PER 1 MG: Performed by: STUDENT IN AN ORGANIZED HEALTH CARE EDUCATION/TRAINING PROGRAM

## 2025-03-25 PROCEDURE — 96417 CHEMO IV INFUS EACH ADDL SEQ: CPT

## 2025-03-25 PROCEDURE — 25010000002 DIPHENHYDRAMINE PER 50 MG: Performed by: STUDENT IN AN ORGANIZED HEALTH CARE EDUCATION/TRAINING PROGRAM

## 2025-03-25 PROCEDURE — 25010000002 HEPARIN LOCK FLUSH PER 10 UNITS: Performed by: STUDENT IN AN ORGANIZED HEALTH CARE EDUCATION/TRAINING PROGRAM

## 2025-03-25 PROCEDURE — A9270 NON-COVERED ITEM OR SERVICE: HCPCS | Performed by: STUDENT IN AN ORGANIZED HEALTH CARE EDUCATION/TRAINING PROGRAM

## 2025-03-25 RX ORDER — DIPHENHYDRAMINE HYDROCHLORIDE 50 MG/ML
50 INJECTION, SOLUTION INTRAMUSCULAR; INTRAVENOUS ONCE
Status: COMPLETED | OUTPATIENT
Start: 2025-03-25 | End: 2025-03-25

## 2025-03-25 RX ORDER — DIPHENHYDRAMINE HCL 25 MG
25 CAPSULE ORAL DAILY PRN
COMMUNITY

## 2025-03-25 RX ORDER — SODIUM CHLORIDE 0.9 % (FLUSH) 0.9 %
20 SYRINGE (ML) INJECTION AS NEEDED
Status: DISCONTINUED | OUTPATIENT
Start: 2025-03-25 | End: 2025-03-26 | Stop reason: HOSPADM

## 2025-03-25 RX ORDER — PALONOSETRON 0.05 MG/ML
0.25 INJECTION, SOLUTION INTRAVENOUS ONCE
Status: COMPLETED | OUTPATIENT
Start: 2025-03-25 | End: 2025-03-25

## 2025-03-25 RX ORDER — HEPARIN SODIUM (PORCINE) LOCK FLUSH IV SOLN 100 UNIT/ML 100 UNIT/ML
500 SOLUTION INTRAVENOUS AS NEEDED
Status: DISCONTINUED | OUTPATIENT
Start: 2025-03-25 | End: 2025-03-26 | Stop reason: HOSPADM

## 2025-03-25 RX ORDER — HEPARIN SODIUM (PORCINE) LOCK FLUSH IV SOLN 100 UNIT/ML 100 UNIT/ML
500 SOLUTION INTRAVENOUS AS NEEDED
Status: CANCELLED | OUTPATIENT
Start: 2025-03-25

## 2025-03-25 RX ORDER — SODIUM CHLORIDE 9 MG/ML
20 INJECTION, SOLUTION INTRAVENOUS ONCE
Status: COMPLETED | OUTPATIENT
Start: 2025-03-25 | End: 2025-03-25

## 2025-03-25 RX ORDER — SODIUM CHLORIDE 0.9 % (FLUSH) 0.9 %
20 SYRINGE (ML) INJECTION AS NEEDED
Status: CANCELLED | OUTPATIENT
Start: 2025-03-25

## 2025-03-25 RX ORDER — ACETAMINOPHEN 325 MG/1
650 TABLET ORAL ONCE
Status: COMPLETED | OUTPATIENT
Start: 2025-03-25 | End: 2025-03-25

## 2025-03-25 RX ADMIN — Medication 20 ML: at 13:30

## 2025-03-25 RX ADMIN — SODIUM CHLORIDE 90 MG: 0.9 INJECTION, SOLUTION INTRAVENOUS at 12:12

## 2025-03-25 RX ADMIN — ACETAMINOPHEN 650 MG: 325 TABLET ORAL at 08:52

## 2025-03-25 RX ADMIN — HEPARIN 500 UNITS: 100 SYRINGE at 13:30

## 2025-03-25 RX ADMIN — RITUXIMAB 680 MG: 10 INJECTION, SOLUTION INTRAVENOUS at 09:20

## 2025-03-25 RX ADMIN — PALONOSETRON 0.25 MG: 0.05 INJECTION, SOLUTION INTRAVENOUS at 11:01

## 2025-03-25 RX ADMIN — SODIUM CHLORIDE 20 ML/HR: 9 INJECTION, SOLUTION INTRAVENOUS at 08:51

## 2025-03-25 RX ADMIN — VINCRISTINE SULFATE 1 MG: 1 INJECTION, SOLUTION INTRAVENOUS at 13:20

## 2025-03-25 RX ADMIN — DIPHENHYDRAMINE HYDROCHLORIDE 50 MG: 50 INJECTION, SOLUTION INTRAMUSCULAR; INTRAVENOUS at 08:53

## 2025-03-25 RX ADMIN — CYCLOPHOSPHAMIDE 1360 MG: 2 INJECTION INTRAVENOUS at 11:37

## 2025-03-25 NOTE — PROGRESS NOTES
Patient to infusion room today for C4 R-CEOP after visit with Dr Rae yesterday. Verified she is still taking prednisone. Per Dr verbal order with Dr Rae, OK to treat with bilirubin.  She would like to start melatonin, pharmacy states it may have an effect on her BP with some interaction with amlodipine so she should monitor to make sure she is maintaining BP control which patient was updated on this.     She tolerated treatment well. She will return 3/26/25 for Day 2.  Declined printing her AVS.

## 2025-03-26 ENCOUNTER — HOSPITAL ENCOUNTER (OUTPATIENT)
Dept: ONCOLOGY | Facility: HOSPITAL | Age: 70
Discharge: HOME | End: 2025-03-26
Payer: MEDICARE

## 2025-03-26 VITALS
SYSTOLIC BLOOD PRESSURE: 162 MMHG | OXYGEN SATURATION: 90 % | HEART RATE: 83 BPM | DIASTOLIC BLOOD PRESSURE: 82 MMHG | RESPIRATION RATE: 14 BRPM | WEIGHT: 167 LBS | TEMPERATURE: 97.8 F | BODY MASS INDEX: 30.73 KG/M2 | HEIGHT: 62 IN

## 2025-03-26 DIAGNOSIS — C85.80 LYMPHOMA MALIGNANT, LARGE CELL: Primary | ICD-10-CM

## 2025-03-26 DIAGNOSIS — C83.31 DIFFUSE LARGE B-CELL LYMPHOMA, LYMPH NODES OF HEAD, FACE, AND NECK: ICD-10-CM

## 2025-03-26 DIAGNOSIS — Z45.2 ENCOUNTER FOR CARE RELATED TO VASCULAR ACCESS PORT: ICD-10-CM

## 2025-03-26 PROCEDURE — 25810000003 SODIUM CHLORIDE 0.9 % SOLUTION 250 ML FLEX CONT: Performed by: STUDENT IN AN ORGANIZED HEALTH CARE EDUCATION/TRAINING PROGRAM

## 2025-03-26 PROCEDURE — 25010000002 HEPARIN LOCK FLUSH PER 10 UNITS: Performed by: STUDENT IN AN ORGANIZED HEALTH CARE EDUCATION/TRAINING PROGRAM

## 2025-03-26 PROCEDURE — 25010000002 ETOPOSIDE 1 GM/50ML SOLUTION 50 ML VIAL: Performed by: STUDENT IN AN ORGANIZED HEALTH CARE EDUCATION/TRAINING PROGRAM

## 2025-03-26 PROCEDURE — 96413 CHEMO IV INFUSION 1 HR: CPT

## 2025-03-26 RX ORDER — HEPARIN SODIUM (PORCINE) LOCK FLUSH IV SOLN 100 UNIT/ML 100 UNIT/ML
500 SOLUTION INTRAVENOUS AS NEEDED
Status: DISCONTINUED | OUTPATIENT
Start: 2025-03-26 | End: 2025-03-27 | Stop reason: HOSPADM

## 2025-03-26 RX ORDER — HEPARIN SODIUM (PORCINE) LOCK FLUSH IV SOLN 100 UNIT/ML 100 UNIT/ML
500 SOLUTION INTRAVENOUS AS NEEDED
Status: CANCELLED | OUTPATIENT
Start: 2025-03-26

## 2025-03-26 RX ORDER — SODIUM CHLORIDE 9 MG/ML
20 INJECTION, SOLUTION INTRAVENOUS ONCE
Status: COMPLETED | OUTPATIENT
Start: 2025-03-26 | End: 2025-03-26

## 2025-03-26 RX ORDER — SODIUM CHLORIDE 0.9 % (FLUSH) 0.9 %
20 SYRINGE (ML) INJECTION AS NEEDED
Status: DISCONTINUED | OUTPATIENT
Start: 2025-03-26 | End: 2025-03-27 | Stop reason: HOSPADM

## 2025-03-26 RX ORDER — SODIUM CHLORIDE 0.9 % (FLUSH) 0.9 %
20 SYRINGE (ML) INJECTION AS NEEDED
Status: CANCELLED | OUTPATIENT
Start: 2025-03-26

## 2025-03-26 RX ADMIN — Medication 10 ML: at 14:46

## 2025-03-26 RX ADMIN — SODIUM CHLORIDE 90 MG: 0.9 INJECTION, SOLUTION INTRAVENOUS at 13:40

## 2025-03-26 RX ADMIN — SODIUM CHLORIDE 20 ML/HR: 9 INJECTION, SOLUTION INTRAVENOUS at 13:40

## 2025-03-26 RX ADMIN — HEPARIN 500 UNITS: 100 SYRINGE at 14:46

## 2025-03-26 NOTE — PROGRESS NOTES
Pt here for D3 R-CEOP, and denies any new complaints.Port accessed and flushed with good blood return noted. No labs collected. Infusion given and tolerated well.Port flushed with saline and heparin prior to needle removal. Pt discharged and AVS declined.Pt aware of next appt.

## 2025-03-27 ENCOUNTER — HOSPITAL ENCOUNTER (OUTPATIENT)
Dept: ONCOLOGY | Facility: HOSPITAL | Age: 70
Discharge: HOME | End: 2025-03-27
Payer: MEDICARE

## 2025-03-27 VITALS
SYSTOLIC BLOOD PRESSURE: 156 MMHG | DIASTOLIC BLOOD PRESSURE: 79 MMHG | BODY MASS INDEX: 30.49 KG/M2 | TEMPERATURE: 97.8 F | RESPIRATION RATE: 14 BRPM | HEART RATE: 69 BPM | OXYGEN SATURATION: 92 % | WEIGHT: 165.7 LBS | HEIGHT: 62 IN

## 2025-03-27 DIAGNOSIS — Z45.2 ENCOUNTER FOR CARE RELATED TO VASCULAR ACCESS PORT: ICD-10-CM

## 2025-03-27 DIAGNOSIS — C85.80 LYMPHOMA MALIGNANT, LARGE CELL: Primary | ICD-10-CM

## 2025-03-27 PROCEDURE — 25010000002 ETOPOSIDE 1 GM/50ML SOLUTION 50 ML VIAL: Performed by: STUDENT IN AN ORGANIZED HEALTH CARE EDUCATION/TRAINING PROGRAM

## 2025-03-27 PROCEDURE — 96413 CHEMO IV INFUSION 1 HR: CPT

## 2025-03-27 PROCEDURE — 25010000002 HEPARIN LOCK FLUSH PER 10 UNITS: Performed by: STUDENT IN AN ORGANIZED HEALTH CARE EDUCATION/TRAINING PROGRAM

## 2025-03-27 PROCEDURE — 25810000003 SODIUM CHLORIDE 0.9 % SOLUTION 250 ML FLEX CONT: Performed by: STUDENT IN AN ORGANIZED HEALTH CARE EDUCATION/TRAINING PROGRAM

## 2025-03-27 RX ORDER — SODIUM CHLORIDE 9 MG/ML
20 INJECTION, SOLUTION INTRAVENOUS ONCE
Status: DISCONTINUED | OUTPATIENT
Start: 2025-03-27 | End: 2025-03-29 | Stop reason: HOSPADM

## 2025-03-27 RX ORDER — SODIUM CHLORIDE 0.9 % (FLUSH) 0.9 %
20 SYRINGE (ML) INJECTION AS NEEDED
Status: CANCELLED | OUTPATIENT
Start: 2025-03-27

## 2025-03-27 RX ORDER — HEPARIN SODIUM (PORCINE) LOCK FLUSH IV SOLN 100 UNIT/ML 100 UNIT/ML
500 SOLUTION INTRAVENOUS AS NEEDED
Status: DISCONTINUED | OUTPATIENT
Start: 2025-03-27 | End: 2025-03-29 | Stop reason: HOSPADM

## 2025-03-27 RX ORDER — ALLOPURINOL 300 MG/1
300 TABLET ORAL DAILY
Qty: 30 TABLET | Refills: 0 | Status: SHIPPED | OUTPATIENT
Start: 2025-03-27

## 2025-03-27 RX ORDER — HEPARIN SODIUM (PORCINE) LOCK FLUSH IV SOLN 100 UNIT/ML 100 UNIT/ML
500 SOLUTION INTRAVENOUS AS NEEDED
Status: CANCELLED | OUTPATIENT
Start: 2025-03-27

## 2025-03-27 RX ORDER — SODIUM CHLORIDE 0.9 % (FLUSH) 0.9 %
20 SYRINGE (ML) INJECTION AS NEEDED
Status: DISCONTINUED | OUTPATIENT
Start: 2025-03-27 | End: 2025-03-29 | Stop reason: HOSPADM

## 2025-03-27 RX ADMIN — Medication 20 ML: at 14:44

## 2025-03-27 RX ADMIN — HEPARIN 500 UNITS: 100 SYRINGE at 14:44

## 2025-03-27 RX ADMIN — SODIUM CHLORIDE 90 MG: 0.9 INJECTION, SOLUTION INTRAVENOUS at 13:40

## 2025-03-28 ENCOUNTER — HOSPITAL ENCOUNTER (OUTPATIENT)
Dept: ONCOLOGY | Facility: HOSPITAL | Age: 70
Discharge: HOME | End: 2025-03-28
Payer: MEDICARE

## 2025-03-28 ENCOUNTER — HOSPITAL ENCOUNTER (OUTPATIENT)
Dept: INTERVENTIONAL RADIOLOGY/VASCULAR | Facility: HOSPITAL | Age: 70
Discharge: HOME | End: 2025-03-28
Admitting: RADIOLOGY
Payer: MEDICARE

## 2025-03-28 VITALS
SYSTOLIC BLOOD PRESSURE: 141 MMHG | WEIGHT: 165 LBS | BODY MASS INDEX: 30.36 KG/M2 | TEMPERATURE: 97.9 F | HEART RATE: 66 BPM | RESPIRATION RATE: 14 BRPM | OXYGEN SATURATION: 90 % | HEIGHT: 62 IN | DIASTOLIC BLOOD PRESSURE: 59 MMHG

## 2025-03-28 DIAGNOSIS — C83.31 DIFFUSE LARGE B-CELL LYMPHOMA, LYMPH NODES OF HEAD, FACE, AND NECK: ICD-10-CM

## 2025-03-28 DIAGNOSIS — C85.80 LYMPHOMA MALIGNANT, LARGE CELL: Primary | ICD-10-CM

## 2025-03-28 LAB
APTT PPP: 29 SECONDS (ref 22.7–35.4)
BASOPHILS # BLD AUTO: 0.01 10*3/MM3 (ref 0–0.2)
BASOPHILS NFR BLD AUTO: 0.1 % (ref 0–1.5)
DEPRECATED RDW RBC AUTO: 63.6 FL (ref 37–54)
EOSINOPHIL # BLD AUTO: 0.02 10*3/MM3 (ref 0–0.4)
EOSINOPHIL NFR BLD AUTO: 0.2 % (ref 0.3–6.2)
ERYTHROCYTE [DISTWIDTH] IN BLOOD BY AUTOMATED COUNT: 16.9 % (ref 12.3–15.4)
HCT VFR BLD AUTO: 35.7 % (ref 34–46.6)
HGB BLD-MCNC: 10.9 G/DL (ref 12–15.9)
IMM GRANULOCYTES # BLD AUTO: 0.07 10*3/MM3 (ref 0–0.05)
IMM GRANULOCYTES NFR BLD AUTO: 0.5 % (ref 0–0.5)
INR PPP: 1 (ref 0.9–1.1)
LYMPHOCYTES # BLD AUTO: 0.69 10*3/MM3 (ref 0.7–3.1)
LYMPHOCYTES NFR BLD AUTO: 5.4 % (ref 19.6–45.3)
MCH RBC QN AUTO: 30.7 PG (ref 26.6–33)
MCHC RBC AUTO-ENTMCNC: 30.5 G/DL (ref 31.5–35.7)
MCV RBC AUTO: 100.6 FL (ref 79–97)
MONOCYTES # BLD AUTO: 0.34 10*3/MM3 (ref 0.1–0.9)
MONOCYTES NFR BLD AUTO: 2.7 % (ref 5–12)
NEUTROPHILS NFR BLD AUTO: 11.7 10*3/MM3 (ref 1.7–7)
NEUTROPHILS NFR BLD AUTO: 91.1 % (ref 42.7–76)
NRBC BLD AUTO-RTO: 0 /100 WBC (ref 0–0.2)
PLATELET # BLD AUTO: 141 10*3/MM3 (ref 140–450)
PMV BLD AUTO: 9.1 FL (ref 6–12)
PROTHROMBIN TIME: 13.1 SECONDS (ref 11.7–14.2)
RBC # BLD AUTO: 3.55 10*6/MM3 (ref 3.77–5.28)
WBC NRBC COR # BLD AUTO: 12.83 10*3/MM3 (ref 3.4–10.8)

## 2025-03-28 PROCEDURE — 25010000002 ONDANSETRON PER 1 MG

## 2025-03-28 PROCEDURE — 25010000002 METHOTREXATE PF 100 MG/4ML SOLUTION 2 ML VIAL: Performed by: STUDENT IN AN ORGANIZED HEALTH CARE EDUCATION/TRAINING PROGRAM

## 2025-03-28 PROCEDURE — 25810000003 SODIUM CHLORIDE 0.9 % SOLUTION: Performed by: RADIOLOGY

## 2025-03-28 PROCEDURE — 25010000002 FENTANYL CITRATE (PF) 50 MCG/ML SOLUTION

## 2025-03-28 PROCEDURE — 25010000002 LIDOCAINE 1 % SOLUTION

## 2025-03-28 PROCEDURE — 85025 COMPLETE CBC W/AUTO DIFF WBC: CPT | Performed by: RADIOLOGY

## 2025-03-28 PROCEDURE — 77003 FLUOROGUIDE FOR SPINE INJECT: CPT

## 2025-03-28 PROCEDURE — 85610 PROTHROMBIN TIME: CPT | Performed by: RADIOLOGY

## 2025-03-28 PROCEDURE — 96450 CHEMOTHERAPY INTO CNS: CPT

## 2025-03-28 PROCEDURE — 96372 THER/PROPH/DIAG INJ SC/IM: CPT

## 2025-03-28 PROCEDURE — 85730 THROMBOPLASTIN TIME PARTIAL: CPT | Performed by: RADIOLOGY

## 2025-03-28 RX ORDER — HEPARIN SODIUM (PORCINE) LOCK FLUSH IV SOLN 100 UNIT/ML 100 UNIT/ML
SOLUTION INTRAVENOUS
Status: DISCONTINUED
Start: 2025-03-28 | End: 2025-03-29 | Stop reason: HOSPADM

## 2025-03-28 RX ORDER — LIDOCAINE HYDROCHLORIDE 10 MG/ML
INJECTION, SOLUTION INFILTRATION; PERINEURAL AS NEEDED
Status: COMPLETED | OUTPATIENT
Start: 2025-03-28 | End: 2025-03-28

## 2025-03-28 RX ORDER — HEPARIN SODIUM (PORCINE) LOCK FLUSH IV SOLN 100 UNIT/ML 100 UNIT/ML
500 SOLUTION INTRAVENOUS AS NEEDED
Status: DISCONTINUED | OUTPATIENT
Start: 2025-03-28 | End: 2025-03-29 | Stop reason: HOSPADM

## 2025-03-28 RX ORDER — ONDANSETRON 2 MG/ML
INJECTION INTRAMUSCULAR; INTRAVENOUS AS NEEDED
Status: COMPLETED | OUTPATIENT
Start: 2025-03-28 | End: 2025-03-28

## 2025-03-28 RX ORDER — SODIUM CHLORIDE 9 MG/ML
75 INJECTION, SOLUTION INTRAVENOUS CONTINUOUS
Status: DISPENSED | OUTPATIENT
Start: 2025-03-28 | End: 2025-03-28

## 2025-03-28 RX ORDER — FENTANYL CITRATE 50 UG/ML
INJECTION, SOLUTION INTRAMUSCULAR; INTRAVENOUS AS NEEDED
Status: COMPLETED | OUTPATIENT
Start: 2025-03-28 | End: 2025-03-28

## 2025-03-28 RX ADMIN — LIDOCAINE HYDROCHLORIDE 8 ML: 10 INJECTION, SOLUTION INFILTRATION; PERINEURAL at 12:57

## 2025-03-28 RX ADMIN — SODIUM CHLORIDE 12 MG: 9 INJECTION, SOLUTION INTRAMUSCULAR; INTRAVENOUS; SUBCUTANEOUS at 13:00

## 2025-03-28 RX ADMIN — ONDANSETRON 4 MG: 2 INJECTION INTRAMUSCULAR; INTRAVENOUS at 12:52

## 2025-03-28 RX ADMIN — SODIUM CHLORIDE 75 ML/HR: 9 INJECTION, SOLUTION INTRAVENOUS at 11:00

## 2025-03-28 RX ADMIN — FENTANYL CITRATE 100 MCG: 50 INJECTION, SOLUTION INTRAMUSCULAR; INTRAVENOUS at 12:52

## 2025-03-28 NOTE — DISCHARGE INSTRUCTIONS
Rest at home for the remainder of the day. Advance your diet as tolerated. You may remove your bandaid tomorrow, then you can shower. Follow up with Dr. Sethi as instructed.

## 2025-03-31 ENCOUNTER — HOSPITAL ENCOUNTER (OUTPATIENT)
Dept: ONCOLOGY | Facility: HOSPITAL | Age: 70
Discharge: HOME | End: 2025-03-31
Admitting: STUDENT IN AN ORGANIZED HEALTH CARE EDUCATION/TRAINING PROGRAM
Payer: MEDICARE

## 2025-03-31 DIAGNOSIS — C83.31 DIFFUSE LARGE B-CELL LYMPHOMA, LYMPH NODES OF HEAD, FACE, AND NECK: ICD-10-CM

## 2025-03-31 DIAGNOSIS — Z45.2 ENCOUNTER FOR CARE RELATED TO VASCULAR ACCESS PORT: Primary | ICD-10-CM

## 2025-03-31 LAB
ALBUMIN SERPL-MCNC: 3.7 G/DL (ref 3.5–5.2)
ALBUMIN/GLOB SERPL: 3.4 G/DL
ALP SERPL-CCNC: 124 U/L (ref 39–117)
ALT SERPL W P-5'-P-CCNC: 55 U/L (ref 1–33)
ANION GAP SERPL CALCULATED.3IONS-SCNC: 13.8 MMOL/L (ref 5–15)
AST SERPL-CCNC: 24 U/L (ref 1–32)
BASOPHILS # BLD AUTO: 0.02 10*3/MM3 (ref 0–0.2)
BASOPHILS NFR BLD AUTO: 0.1 % (ref 0–1.5)
BILIRUB SERPL-MCNC: 2.5 MG/DL (ref 0–1.2)
BUN SERPL-MCNC: 21 MG/DL (ref 8–23)
BUN/CREAT SERPL: 28 (ref 7–25)
CALCIUM SPEC-SCNC: 9.1 MG/DL (ref 8.6–10.5)
CHLORIDE SERPL-SCNC: 104 MMOL/L (ref 98–107)
CO2 SERPL-SCNC: 22.2 MMOL/L (ref 22–29)
CREAT SERPL-MCNC: 0.75 MG/DL (ref 0.57–1)
DEPRECATED RDW RBC AUTO: 64 FL (ref 37–54)
EGFRCR SERPLBLD CKD-EPI 2021: 85.8 ML/MIN/1.73
EOSINOPHIL # BLD AUTO: 0.01 10*3/MM3 (ref 0–0.4)
EOSINOPHIL NFR BLD AUTO: 0 % (ref 0.3–6.2)
ERYTHROCYTE [DISTWIDTH] IN BLOOD BY AUTOMATED COUNT: 17.1 % (ref 12.3–15.4)
GLOBULIN UR ELPH-MCNC: 1.1 GM/DL
GLUCOSE SERPL-MCNC: 135 MG/DL (ref 65–99)
HCT VFR BLD AUTO: 31.1 % (ref 34–46.6)
HGB BLD-MCNC: 9.2 G/DL (ref 12–15.9)
LDH SERPL-CCNC: 419 U/L (ref 135–214)
LYMPHOCYTES # BLD AUTO: 2.59 10*3/MM3 (ref 0.7–3.1)
LYMPHOCYTES NFR BLD AUTO: 7.8 % (ref 19.6–45.3)
MAGNESIUM SERPL-MCNC: 2 MG/DL (ref 1.6–2.4)
MCH RBC QN AUTO: 30.9 PG (ref 26.6–33)
MCHC RBC AUTO-ENTMCNC: 29.6 G/DL (ref 31.5–35.7)
MCV RBC AUTO: 104.4 FL (ref 79–97)
MONOCYTES # BLD AUTO: 0.26 10*3/MM3 (ref 0.1–0.9)
MONOCYTES NFR BLD AUTO: 0.8 % (ref 5–12)
NEUTROPHILS NFR BLD AUTO: 30.48 10*3/MM3 (ref 1.7–7)
NEUTROPHILS NFR BLD AUTO: 91.3 % (ref 42.7–76)
PHOSPHATE SERPL-MCNC: 2.8 MG/DL (ref 2.5–4.5)
PLATELET # BLD AUTO: 77 10*3/MM3 (ref 140–450)
PMV BLD AUTO: 9.7 FL (ref 6–12)
POTASSIUM SERPL-SCNC: 3.6 MMOL/L (ref 3.5–5.2)
PROT SERPL-MCNC: 4.8 G/DL (ref 6–8.5)
RBC # BLD AUTO: 2.98 10*6/MM3 (ref 3.77–5.28)
SODIUM SERPL-SCNC: 140 MMOL/L (ref 136–145)
WBC NRBC COR # BLD AUTO: 33.36 10*3/MM3 (ref 3.4–10.8)

## 2025-03-31 PROCEDURE — 84100 ASSAY OF PHOSPHORUS: CPT | Performed by: STUDENT IN AN ORGANIZED HEALTH CARE EDUCATION/TRAINING PROGRAM

## 2025-03-31 PROCEDURE — 25010000002 HEPARIN LOCK FLUSH PER 10 UNITS: Performed by: STUDENT IN AN ORGANIZED HEALTH CARE EDUCATION/TRAINING PROGRAM

## 2025-03-31 PROCEDURE — 83735 ASSAY OF MAGNESIUM: CPT | Performed by: STUDENT IN AN ORGANIZED HEALTH CARE EDUCATION/TRAINING PROGRAM

## 2025-03-31 PROCEDURE — 36591 DRAW BLOOD OFF VENOUS DEVICE: CPT

## 2025-03-31 PROCEDURE — 83615 LACTATE (LD) (LDH) ENZYME: CPT | Performed by: STUDENT IN AN ORGANIZED HEALTH CARE EDUCATION/TRAINING PROGRAM

## 2025-03-31 PROCEDURE — 85025 COMPLETE CBC W/AUTO DIFF WBC: CPT | Performed by: STUDENT IN AN ORGANIZED HEALTH CARE EDUCATION/TRAINING PROGRAM

## 2025-03-31 PROCEDURE — 80053 COMPREHEN METABOLIC PANEL: CPT | Performed by: STUDENT IN AN ORGANIZED HEALTH CARE EDUCATION/TRAINING PROGRAM

## 2025-03-31 RX ORDER — HEPARIN SODIUM (PORCINE) LOCK FLUSH IV SOLN 100 UNIT/ML 100 UNIT/ML
500 SOLUTION INTRAVENOUS AS NEEDED
Status: CANCELLED | OUTPATIENT
Start: 2025-03-31

## 2025-03-31 RX ORDER — SODIUM CHLORIDE 0.9 % (FLUSH) 0.9 %
20 SYRINGE (ML) INJECTION AS NEEDED
Status: DISCONTINUED | OUTPATIENT
Start: 2025-03-31 | End: 2025-04-01 | Stop reason: HOSPADM

## 2025-03-31 RX ORDER — SODIUM CHLORIDE 0.9 % (FLUSH) 0.9 %
20 SYRINGE (ML) INJECTION AS NEEDED
Status: CANCELLED | OUTPATIENT
Start: 2025-03-31

## 2025-03-31 RX ORDER — HEPARIN SODIUM (PORCINE) LOCK FLUSH IV SOLN 100 UNIT/ML 100 UNIT/ML
500 SOLUTION INTRAVENOUS AS NEEDED
Status: DISCONTINUED | OUTPATIENT
Start: 2025-03-31 | End: 2025-04-01 | Stop reason: HOSPADM

## 2025-03-31 RX ADMIN — Medication 20 ML: at 10:27

## 2025-03-31 RX ADMIN — HEPARIN 500 UNITS: 100 SYRINGE at 10:27

## 2025-04-04 ENCOUNTER — OFFICE VISIT (OUTPATIENT)
Dept: ONCOLOGY | Facility: CLINIC | Age: 70
End: 2025-04-04
Payer: MEDICARE

## 2025-04-04 ENCOUNTER — LAB (OUTPATIENT)
Dept: LAB | Facility: HOSPITAL | Age: 70
End: 2025-04-04
Payer: MEDICARE

## 2025-04-04 ENCOUNTER — HOSPITAL ENCOUNTER (OUTPATIENT)
Dept: ONCOLOGY | Facility: HOSPITAL | Age: 70
Discharge: HOME | End: 2025-04-04
Payer: MEDICARE

## 2025-04-04 ENCOUNTER — TELEPHONE (OUTPATIENT)
Dept: ONCOLOGY | Facility: HOSPITAL | Age: 70
End: 2025-04-04
Payer: MEDICARE

## 2025-04-04 VITALS
SYSTOLIC BLOOD PRESSURE: 118 MMHG | OXYGEN SATURATION: 92 % | TEMPERATURE: 98 F | HEIGHT: 62 IN | HEART RATE: 88 BPM | BODY MASS INDEX: 30.36 KG/M2 | WEIGHT: 165 LBS | DIASTOLIC BLOOD PRESSURE: 76 MMHG

## 2025-04-04 DIAGNOSIS — E86.0 DEHYDRATION: ICD-10-CM

## 2025-04-04 DIAGNOSIS — C85.80 LYMPHOMA MALIGNANT, LARGE CELL: ICD-10-CM

## 2025-04-04 DIAGNOSIS — D64.9 ANEMIA, UNSPECIFIED TYPE: Primary | ICD-10-CM

## 2025-04-04 DIAGNOSIS — C83.31 DIFFUSE LARGE B-CELL LYMPHOMA, LYMPH NODES OF HEAD, FACE, AND NECK: ICD-10-CM

## 2025-04-04 DIAGNOSIS — R53.1 WEAKNESS: ICD-10-CM

## 2025-04-04 DIAGNOSIS — E86.0 DEHYDRATION: Primary | ICD-10-CM

## 2025-04-04 DIAGNOSIS — Z45.2 ENCOUNTER FOR CARE RELATED TO VASCULAR ACCESS PORT: ICD-10-CM

## 2025-04-04 DIAGNOSIS — D64.9 ANEMIA, UNSPECIFIED TYPE: ICD-10-CM

## 2025-04-04 DIAGNOSIS — C83.31 DIFFUSE LARGE B-CELL LYMPHOMA, LYMPH NODES OF HEAD, FACE, AND NECK: Primary | ICD-10-CM

## 2025-04-04 LAB
ALBUMIN SERPL-MCNC: 3.9 G/DL (ref 3.5–5.2)
ALBUMIN/GLOB SERPL: 3 G/DL
ALP SERPL-CCNC: 101 U/L (ref 39–117)
ALT SERPL W P-5'-P-CCNC: 36 U/L (ref 1–33)
ANION GAP SERPL CALCULATED.3IONS-SCNC: 14.6 MMOL/L (ref 5–15)
AST SERPL-CCNC: 22 U/L (ref 1–32)
BASOPHILS # BLD AUTO: 0.01 10*3/MM3 (ref 0–0.2)
BASOPHILS NFR BLD AUTO: 0.2 % (ref 0–1.5)
BILIRUB SERPL-MCNC: 2.1 MG/DL (ref 0–1.2)
BUN SERPL-MCNC: 14 MG/DL (ref 8–23)
BUN/CREAT SERPL: 15.9 (ref 7–25)
CALCIUM SPEC-SCNC: 9.3 MG/DL (ref 8.6–10.5)
CHLORIDE SERPL-SCNC: 105 MMOL/L (ref 98–107)
CO2 SERPL-SCNC: 22.4 MMOL/L (ref 22–29)
CREAT SERPL-MCNC: 0.88 MG/DL (ref 0.57–1)
DEPRECATED RDW RBC AUTO: 60.1 FL (ref 37–54)
EGFRCR SERPLBLD CKD-EPI 2021: 70.8 ML/MIN/1.73
EOSINOPHIL # BLD AUTO: 0.01 10*3/MM3 (ref 0–0.4)
EOSINOPHIL NFR BLD AUTO: 0.2 % (ref 0.3–6.2)
ERYTHROCYTE [DISTWIDTH] IN BLOOD BY AUTOMATED COUNT: 18.5 % (ref 12.3–15.4)
FERRITIN SERPL-MCNC: 1423 NG/ML (ref 13–150)
FOLATE SERPL-MCNC: 11.7 NG/ML (ref 4.78–24.2)
GLOBULIN UR ELPH-MCNC: 1.3 GM/DL
GLUCOSE SERPL-MCNC: 137 MG/DL (ref 65–99)
HCT VFR BLD AUTO: 27.9 % (ref 34–46.6)
HGB BLD-MCNC: 8.2 G/DL (ref 12–15.9)
IRON 24H UR-MRATE: 137 MCG/DL (ref 37–145)
IRON SATN MFR SERPL: 47 % (ref 20–50)
LDH SERPL-CCNC: 421 U/L (ref 135–214)
LYMPHOCYTES # BLD AUTO: 2.04 10*3/MM3 (ref 0.7–3.1)
LYMPHOCYTES NFR BLD AUTO: 39.6 % (ref 19.6–45.3)
MAGNESIUM SERPL-MCNC: 1.8 MG/DL (ref 1.6–2.4)
MCH RBC QN AUTO: 30.8 PG (ref 26.6–33)
MCHC RBC AUTO-ENTMCNC: 29.4 G/DL (ref 31.5–35.7)
MCV RBC AUTO: 104.9 FL (ref 79–97)
MONOCYTES # BLD AUTO: 0.52 10*3/MM3 (ref 0.1–0.9)
MONOCYTES NFR BLD AUTO: 10.1 % (ref 5–12)
NEUTROPHILS NFR BLD AUTO: 2.57 10*3/MM3 (ref 1.7–7)
NEUTROPHILS NFR BLD AUTO: 49.9 % (ref 42.7–76)
PHOSPHATE SERPL-MCNC: 2.8 MG/DL (ref 2.5–4.5)
PLATELET # BLD AUTO: 94 10*3/MM3 (ref 140–450)
PMV BLD AUTO: 9.5 FL (ref 6–12)
POTASSIUM SERPL-SCNC: 3.8 MMOL/L (ref 3.5–5.2)
PROT SERPL-MCNC: 5.2 G/DL (ref 6–8.5)
RBC # BLD AUTO: 2.66 10*6/MM3 (ref 3.77–5.28)
SODIUM SERPL-SCNC: 142 MMOL/L (ref 136–145)
TIBC SERPL-MCNC: 291 MCG/DL (ref 298–536)
TRANSFERRIN SERPL-MCNC: 195 MG/DL (ref 200–360)
URATE SERPL-MCNC: 2.5 MG/DL (ref 2.4–5.7)
VIT B12 BLD-MCNC: 1109 PG/ML (ref 211–946)
WBC NRBC COR # BLD AUTO: 5.15 10*3/MM3 (ref 3.4–10.8)

## 2025-04-04 PROCEDURE — 82607 VITAMIN B-12: CPT | Performed by: NURSE PRACTITIONER

## 2025-04-04 PROCEDURE — 25810000003 SODIUM CHLORIDE 0.9 % SOLUTION: Performed by: NURSE PRACTITIONER

## 2025-04-04 PROCEDURE — 84100 ASSAY OF PHOSPHORUS: CPT | Performed by: NURSE PRACTITIONER

## 2025-04-04 PROCEDURE — 82746 ASSAY OF FOLIC ACID SERUM: CPT | Performed by: NURSE PRACTITIONER

## 2025-04-04 PROCEDURE — 96360 HYDRATION IV INFUSION INIT: CPT

## 2025-04-04 PROCEDURE — 85025 COMPLETE CBC W/AUTO DIFF WBC: CPT

## 2025-04-04 PROCEDURE — 36415 COLL VENOUS BLD VENIPUNCTURE: CPT

## 2025-04-04 PROCEDURE — 84550 ASSAY OF BLOOD/URIC ACID: CPT | Performed by: NURSE PRACTITIONER

## 2025-04-04 PROCEDURE — 83735 ASSAY OF MAGNESIUM: CPT | Performed by: NURSE PRACTITIONER

## 2025-04-04 PROCEDURE — 84466 ASSAY OF TRANSFERRIN: CPT | Performed by: NURSE PRACTITIONER

## 2025-04-04 PROCEDURE — 80053 COMPREHEN METABOLIC PANEL: CPT | Performed by: NURSE PRACTITIONER

## 2025-04-04 PROCEDURE — 96361 HYDRATE IV INFUSION ADD-ON: CPT

## 2025-04-04 PROCEDURE — 83540 ASSAY OF IRON: CPT | Performed by: NURSE PRACTITIONER

## 2025-04-04 PROCEDURE — 83615 LACTATE (LD) (LDH) ENZYME: CPT | Performed by: STUDENT IN AN ORGANIZED HEALTH CARE EDUCATION/TRAINING PROGRAM

## 2025-04-04 PROCEDURE — 82728 ASSAY OF FERRITIN: CPT | Performed by: NURSE PRACTITIONER

## 2025-04-04 PROCEDURE — 25010000002 HEPARIN LOCK FLUSH PER 10 UNITS: Performed by: STUDENT IN AN ORGANIZED HEALTH CARE EDUCATION/TRAINING PROGRAM

## 2025-04-04 RX ORDER — SODIUM CHLORIDE 0.9 % (FLUSH) 0.9 %
20 SYRINGE (ML) INJECTION AS NEEDED
Status: CANCELLED | OUTPATIENT
Start: 2025-04-04

## 2025-04-04 RX ORDER — HEPARIN SODIUM (PORCINE) LOCK FLUSH IV SOLN 100 UNIT/ML 100 UNIT/ML
500 SOLUTION INTRAVENOUS AS NEEDED
Status: CANCELLED | OUTPATIENT
Start: 2025-04-04

## 2025-04-04 RX ORDER — HEPARIN SODIUM (PORCINE) LOCK FLUSH IV SOLN 100 UNIT/ML 100 UNIT/ML
500 SOLUTION INTRAVENOUS AS NEEDED
Status: DISCONTINUED | OUTPATIENT
Start: 2025-04-04 | End: 2025-04-05 | Stop reason: HOSPADM

## 2025-04-04 RX ORDER — SODIUM CHLORIDE 0.9 % (FLUSH) 0.9 %
20 SYRINGE (ML) INJECTION AS NEEDED
Status: DISCONTINUED | OUTPATIENT
Start: 2025-04-04 | End: 2025-04-05 | Stop reason: HOSPADM

## 2025-04-04 RX ADMIN — HEPARIN 500 UNITS: 100 SYRINGE at 13:45

## 2025-04-04 RX ADMIN — Medication 10 ML: at 13:45

## 2025-04-04 RX ADMIN — SODIUM CHLORIDE 1000 ML: 9 INJECTION, SOLUTION INTRAVENOUS at 12:11

## 2025-04-04 NOTE — TELEPHONE ENCOUNTER
Pt lab appt changed from Monday to Wednesday-pt verified time and date for next week. Inbox sent to  pool since pt gets weekly labs but her labs in the order inquiry say every 2 weeks.

## 2025-04-04 NOTE — PROGRESS NOTES
Pt came from seeing Ross TAVERAS for IVF's. Port accessed and flushed with good blood return noted. 10cc of blood wasted prior to specimen collection. Blood specimen obtained and sent to lab for processing per protocol.  Port flushed with saline and heparin prior to needle removal. Folate and B12 levels added by ross and drawn from port.  Pt tolerated treatment and was discharged. Pt declined AVS. Cathflo was accidentally released but not given.

## 2025-04-04 NOTE — PROGRESS NOTES
HEMATOLOGY ONCOLOGY OUTPATIENT FOLLOW UP       Patient name: Catarina Mclean  : 1955  MRN: [id number]  Primary Care Physician: Bozena Alamo NP  Referring Physician: No ref. provider found  Reason For Consult:       History of Present Illness:  Patient is a 69-year-old female who has been referred to us for further evaluation and management of diffuse lymphadenopathy.    She reported having symptoms of worsening fatigue, poor appetite intermittent night sweats and palpable axillary adenopathy approximately 2 months ago.  She was seen by her PCP for the symptoms and was empirically treated with oral antibiotics with limited improvement in her symptoms.      Bilateral breast screening mammogram in 2024 was reported unremarkable.  [BI-RADS 1]    Subsequently a CT chest abdomen pelvis were ordered and is reported as follows:  2024: CT chest/abdomen/pelvis:  Impression:  1.Supra diaphragmatic and infra diaphragmatic lymphadenopathy concerning for lymphoma.     Her past medical history significant for non-Hodgkin lymphoma [likely DLBCL], diagnosed in -10  She reported that she was being followed by Dr. Barkley and had systemic therapy for lymphoma in 3312-3416.  She did not follow-up with oncologist thereafter and was lost to follow-up.  Patient is unable to provide any additional details about her diagnosis or treatment.    Limited prior medical records from  were reviewed:    Patient underwent bilateral tonsillectomy in 2009,  Pathology: Left tonsil was reported to have involvement from malignant lymphoma, large cell type of B-cell origin.  [Of activated B-cell phenotype].  IHC staining was reported positive for CD45, CD20, Bcl-2 and MUM1 with Ki-67 98%.  The tumor was negative for BEV.    A bone marrow biopsy was reported negative for lymphoma involvement at that time.    Patient stated that she is up-to-date whether age-appropriate cancer screening  including annual screening mammograms, Pap smears and colonoscopies.  Last colonoscopy was approximately 10 years ago and was reported normal per patient.    Family history significant for unknown cancer in brother who has been recently diagnosed..      Patient presents for initial consultation today.  She reported having symptoms of generalized fatigue, poor appetite and palpable axillary adenopathy in right axilla and right supraclavicular area for last few weeks.  Denied any other respiratory or GI/ symptoms.  She reported having occasional night sweats but denied any weight loss or fever/chills.  No recent infections reported.    11/20/24: Lymph node, right axillary:  Large B-cell lymphoma  Immunohistochemistry  There is predominance of intermediate to large sized CD45 and CD20+ B-lymphocytes. CD3 and CD5 reveal numerous small T-lymphocytes. B-cells are negative for CD5, CD10, CD15, CD30, and BEV. They are positive for PAX-5, BCL-2, BCL-6, and MUM-1, as well as c-MYC (~40% of neoplastic cells). Ki-67 shows high proliferative rate (50-60%).    Flow Cytometry: Abnormal/ monotypic B-cell population (4% of sample)  Comment: Scatter properties compatible with increased cell size, cells characterized as:  CD45+, CD19+, CD20+, CD5-, CD10-, CD23-, FMC7-, CD30-, CD38-, CD43-/+, HLA  DR+, sIg lambda+    FISH PANEL: Abnormal Lymphoma FISH panel  Aneuploidy: gain of BCL6/3q, MYC/8q, and BCL2/18q  Additional IGH/14q    12/3/24: PET/CT:  Impression:  1. Hypermetabolic left cervical chain, bilateral supraclavicular, right axillary, right subpectoral, portacaval and retroperitoneal adenopathy consistent with known lymphoma.  2. Two small hypermetabolic splenic lesions likely also related to lymphoma. No splenomegaly.  3. Hypermetabolic osseous uptake associated with C3 vertebral body and right posterior 12th rib likely osseous involvement of lymphoma.  4. Additional incidental CT findings above.    12/10/24: Patient seen  today for follow-up visit to discuss her biopsy and imaging results.  She reported again having ongoing fatigue and decreased appetite.  Denied any new complaints today.    12/16/2024: Transthoracic echocardiogram:    Left ventricular ejection fraction appears to be 61 - 65%.    Left ventricular diastolic function is consistent with (grade Ia w/high LAP) impaired relaxation.    The left atrial cavity is dilated.    Estimated right ventricular systolic pressure from tricuspid regurgitation is normal (<35 mmHg).    No significant valvular abnormalities noted.    Extracardiac findings: Periportal lymph nodes are noted.      12/23/2024: Patient seen today for follow-up.  Has petechial rash on her chest and extremities which is new.  Has some ongoing fatigue but otherwise stable    12/25/24- 12/2724:   The patient was admitted to [facility] hospital with complaint of rash which began on face and spread to the chest. She was also noted to have thrombocytopenia with Plt count jacqueline at 58K. She was started on steroids and Atarax with improvement of rash and plt counts. The patient was discharged home on Medrol dose pack as well as Benadryl as needed in view of the rash.     1/10/25: Patient seen today for follow-up visit.  She reported clinically doing better, reported improved appetite and good energy levels today.  She was on tapering steroids since her hospitalization which she has completed approximately 4-5 days ago.  No new complaints reported.  Stated that her palpable supraclavicular and right axillary adenopathy has improved.  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    1/21/25: Patient seen today for follow-up visit.  Clinically doing well this morning, reportedly she had a severe allergic reaction to Bactrim recently for which she was seen at [facility] ER on 1/14/2025 after  she accidentally took Bactrim although this was discontinued previously.  She has recovered from her acute symptoms and is near her baseline today.  Reported good energy levels and appetite    1/21/25: C1D1 R-CEOP with IT Methotrexate    2/11/2025: Marky is here today for her routine follow-up and evaluation for readiness for cycle 2 of treatment.  She reports that she has been doing very well.  Denies any side effects of treatment outside of hair loss.  Reports that she was seen at [facility] for evaluation for transplant but that due to her excellent response to treatment it is not recommended at this time.  She reports that she is no longer able to feel the right supraclavicular or right axillary lymph node.    2/11/25: C2D1 R-CEOP with IT Methotrexate    3/4/25: Seen today for follow-up prior to cycle 3 chemotherapy.  Appears to have good tolerance so far except for progressive fatigue.  No other side effects reported.  Weight/appetite is stable.  Her supraclavicular and axillary lymphadenopathy has significantly improved.  Compliant with antiviral and PCP prophylaxis.  No signs/symptoms concerning for infection.    3/6/25: C3D1 R-CEOP with IT Methotrexate    3/20/25: NM PET/CT:  IMPRESSION:  Interval complete response to therapy since 12/3/2024. No residual soft tissue mass or adenopathy. No abnormal hypermetabolic activity.    History of Present Illness  3/24/25: The patient presents for follow up visit and to discuss restaging PET/CT.    She reports no complications following her most recent chemotherapy session, except for the need for a blood transfusion. She is not experiencing any night sweats. She is currently on antimicrobial prophylaxis and allopurinol with good compliance. She is scheduled for her next treatment tomorrow.    She expresses concern about potential ocular side effects, noting that her pupils appear constricted and has teary eyes most of the daytime. She also has sensitivity to bright light.   She has been utilizing eyedrops for symptom management. She has known history of allergies.    She has received epidural injections for pain management, with the first two sessions being uneventful. However, she experienced discomfort during the third session.     ALLERGIES  The patient has no known allergies.    MEDICATIONS  Current: allopurinol, acyclovir    Subjective:  4/4/2025: Janessa is here today as an acute visit due to complaints of weakness.  She denies any fever or signs or symptoms of infection.  Reports that her legs feel weak.  She does report that she is eating and drinking but not as well has normal due to fatigue and lack of appetite.  Her orthostatics at the visit were positive with a drop in her systolic BP by approximately 30 points and then the increase in her heart rate.  She does have dry mucous membranes also.  Review of her CBC shows her to be anemic with a hemoglobin of 8.2 g/dL.  All parameters above transfusion protocols and no leukopenia/neutropenia noted.  CMP is unremarkable with the exception of her bilirubin which is 2.1 and tumor lysis labs are within normal parameters.    Past Medical History:   Diagnosis Date    Drug therapy     Hyperlipidemia     Hypertension     Non Hodgkin's lymphoma 2009       Past Surgical History:   Procedure Laterality Date    BREAST BIOPSY      HYSTERECTOMY           Current Outpatient Medications:     acyclovir (ZOVIRAX) 400 MG tablet, Take 1 tablet by mouth 2 (Two) Times a Day. Take no more than 5 doses a day., Disp: 60 tablet, Rfl: 5    allopurinol (ZYLOPRIM) 300 MG tablet, Take 1 tablet by mouth Daily., Disp: 30 tablet, Rfl: 0    amLODIPine (NORVASC) 5 MG tablet, Take 1 tablet by mouth Daily., Disp: , Rfl:     aspirin 81 MG chewable tablet, Chew Daily., Disp: , Rfl:     carvedilol (COREG) 6.25 MG tablet, Take 1 tablet by mouth 2 (Two) Times a Day., Disp: 180 tablet, Rfl: 3    dapsone 100 MG tablet, Take 1 tablet by mouth Daily., Disp: 30 tablet, Rfl: 3     diphenhydrAMINE (BENADRYL) 25 mg capsule, Take 1 capsule by mouth Daily As Needed for Allergies., Disp: , Rfl:     fluconazole (DIFLUCAN) 150 MG tablet, , Disp: , Rfl:     lidocaine-prilocaine (EMLA) 2.5-2.5 % cream, Apply 1 Application topically to the appropriate area as directed As Needed (apply 1 hour prior to port access)., Disp: 30 g, Rfl: 2    lisinopril (PRINIVIL,ZESTRIL) 20 MG tablet, Take 1 tablet by mouth Daily., Disp: , Rfl:     Melatonin 10 MG chewable tablet, Chew 10 mg., Disp: , Rfl:     Omega-3 Fatty Acids (fish oil) 1000 MG capsule capsule, Take 2 capsules by mouth Daily With Breakfast., Disp: , Rfl:     ondansetron (ZOFRAN) 8 MG tablet, Take 1 tablet by mouth 3 (Three) Times a Day As Needed for Nausea or Vomiting., Disp: 30 tablet, Rfl: 5    potassium chloride (KLOR-CON M20) 20 MEQ CR tablet, Take 1 tablet by mouth Daily., Disp: 5 tablet, Rfl: 0    pravastatin (PRAVACHOL) 20 MG tablet, Take 1 tablet by mouth Daily., Disp: , Rfl:     predniSONE (DELTASONE) 50 MG tablet, Take 2 tablets by mouth Daily. Take with food.  Bring the first dose to chemotherapy appointment., Disp: 10 tablet, Rfl: 5    traZODone (DESYREL) 50 MG tablet, , Disp: , Rfl:     Allergies   Allergen Reactions    Sulfamethoxazole-Trimethoprim Swelling       Family History   Problem Relation Age of Onset    Liver cancer Brother     Breast cancer Maternal Aunt        Cancer-related family history includes Breast cancer in her maternal aunt; Liver cancer in her brother.      Social History     Tobacco Use    Smoking status: Never     Passive exposure: Never    Smokeless tobacco: Never   Vaping Use    Vaping status: Never Used   Substance Use Topics    Alcohol use: Not Currently     Alcohol/week: 10.0 standard drinks of alcohol     Types: 10 Cans of beer per week     Comment: WEEKLY    Drug use: Never     Social History     Social History Narrative    Not on file       ROS:   Review of Systems   Constitutional:  Positive for fatigue.  "  HENT: Negative.     Eyes: Negative.    Respiratory: Negative.     Cardiovascular: Negative.    Gastrointestinal: Negative.    Endocrine: Negative.    Genitourinary: Negative.    Musculoskeletal: Negative.    Skin: Negative.    Allergic/Immunologic: Negative.    Neurological:  Positive for weakness.   Hematological: Negative.    Psychiatric/Behavioral: Negative.           Objective:    Vital Signs:  Vitals:    04/04/25 1113 04/04/25 1119 04/04/25 1120   BP: 143/82 139/73  Comment: laying 118/76  Comment: standing   Pulse: 70 84 88   Temp: 98 °F (36.7 °C)     SpO2: 92%     Weight: 74.8 kg (165 lb)     Height: 157.5 cm (62.01\")     PainSc: 0-No pain                 Body mass index is 30.17 kg/m².    ECOG  (1) Restricted in physically strenuous activity, ambulatory and able to do work of light nature    Physical Exam:   Physical Exam  Constitutional:       General: She is not in acute distress.     Appearance: Normal appearance. She is normal weight.   HENT:      Head: Normocephalic and atraumatic.      Right Ear: External ear normal.      Left Ear: External ear normal.      Nose: Nose normal.      Mouth/Throat:      Mouth: Mucous membranes are dry.      Pharynx: Oropharynx is clear.   Eyes:      Extraocular Movements: Extraocular movements intact.      Conjunctiva/sclera: Conjunctivae normal.      Pupils: Pupils are equal, round, and reactive to light.   Cardiovascular:      Rate and Rhythm: Normal rate.      Pulses: Normal pulses.   Pulmonary:      Effort: Pulmonary effort is normal. No respiratory distress.   Abdominal:      General: Abdomen is flat.      Palpations: Abdomen is soft.   Musculoskeletal:         General: Normal range of motion.      Cervical back: Normal range of motion and neck supple.   Skin:     General: Skin is warm and dry.   Neurological:      General: No focal deficit present.      Mental Status: She is alert and oriented to person, place, and time.   Psychiatric:         Mood and Affect: Mood " "normal.         Behavior: Behavior normal.         Thought Content: Thought content normal.         Judgment: Judgment normal.         Lab Results - Last 18 Months   Lab Units 04/04/25  1028 03/31/25  1029 03/28/25  1119   WBC 10*3/mm3 5.15 33.36* 12.83*   HEMOGLOBIN g/dL 8.2* 9.2* 10.9*   HEMATOCRIT % 27.9* 31.1* 35.7   PLATELETS 10*3/mm3 94* 77* 141   MCV fL 104.9* 104.4* 100.6*     Lab Results - Last 18 Months   Lab Units 04/04/25  1028 03/31/25  1029 03/24/25  1037   SODIUM mmol/L 142 140 139   POTASSIUM mmol/L 3.8 3.6 3.7   CHLORIDE mmol/L 105 104 103   CO2 mmol/L 22.4 22.2 25.1   BUN mg/dL 14 21 12   CREATININE mg/dL 0.88 0.75 0.87   CALCIUM mg/dL 9.3 9.1 9.5   BILIRUBIN mg/dL 2.1* 2.5* 2.3*   ALK PHOS U/L 101 124* 49   ALT (SGPT) U/L 36* 55* 34*   AST (SGOT) U/L 22 24 19   GLUCOSE mg/dL 137* 135* 160*       Lab Results   Component Value Date    GLUCOSE 137 (H) 04/04/2025    BUN 14 04/04/2025    CREATININE 0.88 04/04/2025    BCR 15.9 04/04/2025    K 3.8 04/04/2025    CO2 22.4 04/04/2025    CALCIUM 9.3 04/04/2025    ALBUMIN 3.9 04/04/2025    AST 22 04/04/2025    ALT 36 (H) 04/04/2025       Lab Results - Last 18 Months   Lab Units 03/28/25  1119 03/17/25  1028 03/07/25  1124 02/14/25  1141   INR  1.00 1.30* 1.12* 1.10   APTT seconds 29.0  --  28.3 20.3*       No results found for: \"IRON\", \"TIBC\", \"FERRITIN\"    No results found for: \"FOLATE\"    No results found for: \"OCCULTBLD\"    No results found for: \"RETICCTPCT\"  No results found for: \"CNPDRKCS40\"  No results found for: \"SPEP\", \"UPEP\"  LDH   Date Value Ref Range Status   04/04/2025 421 (H) 135 - 214 U/L Final     Uric Acid   Date Value Ref Range Status   04/04/2025 2.5 2.4 - 5.7 mg/dL Final     Lab Results   Component Value Date    OTTONIEL Positive (A) 11/20/2024    SEDRATE 4 12/10/2024     No results found for: \"FIBRINOGEN\", \"HAPTOGLOBIN\"  Lab Results   Component Value Date    PTT 29.0 03/28/2025    INR 1.00 03/28/2025     No results found for: \"\"  No " "results found for: \"CEA\"  No components found for: \"CA-19-9\"  No results found for: \"PSA\"  Lab Results   Component Value Date    SEDRATE 4 12/10/2024          Assessment & Plan :  Assessment & Plan        Generalized lymphadenopathy:  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  Patient has remote history of aggressive NHL, status post chemotherapy in 2009-10.  -CT chest/abdomen/pelvis reviewed, noted to have extensive lymphadenopathy involving supraclavicular, axillary and intra-abdominal lymph nodes.  No solid organ masses noted concerning for solid organ metastasis.  -Axillary lymph node biopsy and PET/CT findings reviewed as above.  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  Ki-67 is elevated at 50-60%  -I reviewed these findings with patient in detail.  Given extensive disease and aggressive disease biology, she was initially being considered for dose adjusted R-EPOCH regimen, I also discussed her case with [facility] BMT attending Dr. Zuleta. Due to concern about prior exposure to anthracyclines during at time of her initial diagnosis and treatment in 2009-10, repeat treatment with anthracycline based regimen may carry risk of significant cardiotoxicity.  She has been since evaluated at [facility] BMT clinic by Dr. Hirsch who recommended to start patient on R-CEOP regimen for total 6 cycles replacing Doxorubicin with Etoposide.  -This plan was discussed in detail with patient today.  Potential treatment related adverse effects were explained to her.  She verbalized understanding and is agreeable to treatment.   -She has completed 2 cycles of R-CEOP with good tolerance so far, however has somewhat progressive fatigue.  -Labs reviewed, noted to have elevated serum bilirubin levels, dose reduced " vincristine by 50% with cycle 3.has good tolerance.  -interval PET scan results indicate a complete response to the ongoing treatment regimen, with no residual lymphoma detected at this stage.    CNS prophylaxis:    The patient will need CNS prophylaxis  with IT methotrexate with systemic therapy given high risk for CNS involvement [CNS IPI 4, associated with high risk of CNS involvement].  -patient is scheduled for IT Chemotherapy with methotrexate with each cycle.      Cardiac health: Detailed medical records pertaining to her prior cancer treatment are not available, however it seems she had outpatient chemotherapy with R-CHOP or similar regimen containing anthracyclines..  Will try to obtain these records but there is significant concern about patient crossing the maximum recommended lifetime dose for anthracyclines with her planned lymphoma treatment.  -Discussed her case with cardiology [Dr. García].  She was referred to cardiology clinic to discuss cardiac risk reduction and to start prophylactic treatment.   -Initial echocardiogram reported normal with EF 61-65%  She has been started on Coreg and Jardiance.   -Patient has been started on an anthracycline free regimen as above      TLS prophylaxis: Started patient on allopurinol due to bulky disease and risk of TLS.  Will continue throughout treatment.  Reviewed with the patient.  Continue to monitor biweekly labs    ID: Started patient on antiviral and PCP prophylaxis with acyclovir and Bactrim. Bactrim has been held due to concern about drug induced rash and thrombocytopenia.  Will start patient on Daily dapsone [atovaquone not covered by insurance].  Reviewed with the patient.    Cytopenias: Monitor biweekly CBC and CMP and transfuse PRN to maintain Hb >7.5 and Plt count >10K. She required 1 unit RBC transfusion last week after cycle 3 chemotherapy  Check iron profile, ferritin, B12, folate today and replace as indicated    Skin Rash:    Thrombocytopenia:  This has improved. Patient is s/p completion of tapering steroids.   Persistent thrombocytopenia could be secondary to accidental use of bactrim recently.  Resolved      Bactrim allergy : Patient has had severe symptoms following accidentally taking Bactrim recently requiring her to be seen in ER, she was treated with IV Solu-Medrol, Benadryl and Pepcid.  Continue to hold off on Bactrim moving forward.  Patient was started on Dapsone.  G6PD levels were reported normal      Eye issues.  The patient's ocular symptoms, including watery eyes and sensitivity to bright light, are unlikely to be a side effect of the current medication regimen. These symptoms could potentially be attributed to seasonal allergies. The patient is advised to continue using topical lubricating eyedrops for relief.      Leukocytosis: Secondary to G-CSF and steroids.  Continue to monitor    3-week follow-up with scheduled treatment,, sooner as needed    Discussion: Assessment labs unremarkable except for signs of dehydration.  Patient received 1 L of normal saline over 90 minutes today and encouraged to stay well-hydrated.  Monitor for signs and symptoms of infection and contact the office for any new or worsening symptoms.    Thank you very much for providing the opportunity to participate in this patient’s care. Please do not hesitate to call if there are any other questions.

## 2025-04-05 DIAGNOSIS — C85.80 LYMPHOMA MALIGNANT, LARGE CELL: ICD-10-CM

## 2025-04-07 ENCOUNTER — LAB (OUTPATIENT)
Dept: LAB | Facility: HOSPITAL | Age: 70
End: 2025-04-07
Payer: MEDICARE

## 2025-04-07 ENCOUNTER — HOSPITAL ENCOUNTER (OUTPATIENT)
Dept: ONCOLOGY | Facility: HOSPITAL | Age: 70
Discharge: HOME | End: 2025-04-07
Payer: MEDICARE

## 2025-04-07 ENCOUNTER — TELEPHONE (OUTPATIENT)
Dept: ONCOLOGY | Facility: CLINIC | Age: 70
End: 2025-04-07
Payer: MEDICARE

## 2025-04-07 ENCOUNTER — OFFICE VISIT (OUTPATIENT)
Dept: ONCOLOGY | Facility: CLINIC | Age: 70
End: 2025-04-07
Payer: MEDICARE

## 2025-04-07 VITALS
TEMPERATURE: 98 F | DIASTOLIC BLOOD PRESSURE: 66 MMHG | HEIGHT: 62 IN | BODY MASS INDEX: 30.36 KG/M2 | WEIGHT: 165 LBS | HEART RATE: 82 BPM | OXYGEN SATURATION: 92 % | SYSTOLIC BLOOD PRESSURE: 104 MMHG

## 2025-04-07 DIAGNOSIS — E86.0 DEHYDRATION: ICD-10-CM

## 2025-04-07 DIAGNOSIS — E80.6 HYPERBILIRUBINEMIA: ICD-10-CM

## 2025-04-07 DIAGNOSIS — R53.1 WEAKNESS: ICD-10-CM

## 2025-04-07 DIAGNOSIS — Z45.2 ENCOUNTER FOR CARE RELATED TO VASCULAR ACCESS PORT: ICD-10-CM

## 2025-04-07 DIAGNOSIS — R53.1 WEAKNESS: Primary | ICD-10-CM

## 2025-04-07 DIAGNOSIS — C83.31 DIFFUSE LARGE B-CELL LYMPHOMA, LYMPH NODES OF HEAD, FACE, AND NECK: ICD-10-CM

## 2025-04-07 DIAGNOSIS — E87.6 HYPOKALEMIA: Primary | ICD-10-CM

## 2025-04-07 DIAGNOSIS — C83.31 DIFFUSE LARGE B-CELL LYMPHOMA, LYMPH NODES OF HEAD, FACE, AND NECK: Primary | ICD-10-CM

## 2025-04-07 LAB
ALBUMIN SERPL-MCNC: 3.7 G/DL (ref 3.5–5.2)
ALBUMIN/GLOB SERPL: 2.8 G/DL
ALP SERPL-CCNC: 110 U/L (ref 39–117)
ALT SERPL W P-5'-P-CCNC: 30 U/L (ref 1–33)
ANION GAP SERPL CALCULATED.3IONS-SCNC: 14.3 MMOL/L (ref 5–15)
AST SERPL-CCNC: 26 U/L (ref 1–32)
BASOPHILS # BLD AUTO: 0.01 10*3/MM3 (ref 0–0.2)
BASOPHILS NFR BLD AUTO: 0.1 % (ref 0–1.5)
BILIRUB SERPL-MCNC: 2 MG/DL (ref 0–1.2)
BILIRUB UR QL STRIP: NEGATIVE
BUN SERPL-MCNC: 10 MG/DL (ref 8–23)
BUN/CREAT SERPL: 9.4 (ref 7–25)
CALCIUM SPEC-SCNC: 8.6 MG/DL (ref 8.6–10.5)
CHLORIDE SERPL-SCNC: 102 MMOL/L (ref 98–107)
CLARITY UR: CLEAR
CO2 SERPL-SCNC: 22.7 MMOL/L (ref 22–29)
COLOR UR: YELLOW
CREAT SERPL-MCNC: 1.06 MG/DL (ref 0.57–1)
DEPRECATED RDW RBC AUTO: 84.1 FL (ref 37–54)
EGFRCR SERPLBLD CKD-EPI 2021: 56.6 ML/MIN/1.73
EOSINOPHIL # BLD AUTO: 0.06 10*3/MM3 (ref 0–0.4)
EOSINOPHIL NFR BLD AUTO: 0.3 % (ref 0.3–6.2)
ERYTHROCYTE [DISTWIDTH] IN BLOOD BY AUTOMATED COUNT: 23.4 % (ref 12.3–15.4)
GLOBULIN UR ELPH-MCNC: 1.3 GM/DL
GLUCOSE SERPL-MCNC: 140 MG/DL (ref 65–99)
GLUCOSE UR STRIP-MCNC: ABNORMAL MG/DL
HCT VFR BLD AUTO: 29.9 % (ref 34–46.6)
HGB BLD-MCNC: 8.8 G/DL (ref 12–15.9)
HGB UR QL STRIP.AUTO: NEGATIVE
KETONES UR QL STRIP: NEGATIVE
LDH SERPL-CCNC: 527 U/L (ref 135–214)
LEUKOCYTE ESTERASE UR QL STRIP.AUTO: NEGATIVE
LYMPHOCYTES # BLD AUTO: 3.14 10*3/MM3 (ref 0.7–3.1)
LYMPHOCYTES NFR BLD AUTO: 18 % (ref 19.6–45.3)
MAGNESIUM SERPL-MCNC: 1.7 MG/DL (ref 1.6–2.4)
MCH RBC QN AUTO: 31.9 PG (ref 26.6–33)
MCHC RBC AUTO-ENTMCNC: 29.4 G/DL (ref 31.5–35.7)
MCV RBC AUTO: 108.3 FL (ref 79–97)
MONOCYTES # BLD AUTO: 1.33 10*3/MM3 (ref 0.1–0.9)
MONOCYTES NFR BLD AUTO: 7.6 % (ref 5–12)
NEUTROPHILS NFR BLD AUTO: 12.9 10*3/MM3 (ref 1.7–7)
NEUTROPHILS NFR BLD AUTO: 74 % (ref 42.7–76)
NITRITE UR QL STRIP: NEGATIVE
PH UR STRIP.AUTO: 6 [PH] (ref 5–8)
PHOSPHATE SERPL-MCNC: 3.7 MG/DL (ref 2.5–4.5)
PLATELET # BLD AUTO: 117 10*3/MM3 (ref 140–450)
PMV BLD AUTO: 9.1 FL (ref 6–12)
POTASSIUM SERPL-SCNC: 2.9 MMOL/L (ref 3.5–5.2)
PROT SERPL-MCNC: 5 G/DL (ref 6–8.5)
PROT UR QL STRIP: NEGATIVE
RBC # BLD AUTO: 2.76 10*6/MM3 (ref 3.77–5.28)
SODIUM SERPL-SCNC: 139 MMOL/L (ref 136–145)
SP GR UR STRIP: 1.01 (ref 1–1.03)
UROBILINOGEN UR QL STRIP: ABNORMAL
WBC NRBC COR # BLD AUTO: 17.44 10*3/MM3 (ref 3.4–10.8)

## 2025-04-07 PROCEDURE — 36415 COLL VENOUS BLD VENIPUNCTURE: CPT

## 2025-04-07 PROCEDURE — 1160F RVW MEDS BY RX/DR IN RCRD: CPT | Performed by: NURSE PRACTITIONER

## 2025-04-07 PROCEDURE — 36593 DECLOT VASCULAR DEVICE: CPT

## 2025-04-07 PROCEDURE — 1126F AMNT PAIN NOTED NONE PRSNT: CPT | Performed by: NURSE PRACTITIONER

## 2025-04-07 PROCEDURE — 81003 URINALYSIS AUTO W/O SCOPE: CPT | Performed by: NURSE PRACTITIONER

## 2025-04-07 PROCEDURE — 80053 COMPREHEN METABOLIC PANEL: CPT | Performed by: NURSE PRACTITIONER

## 2025-04-07 PROCEDURE — 25010000002 HEPARIN LOCK FLUSH PER 10 UNITS: Performed by: STUDENT IN AN ORGANIZED HEALTH CARE EDUCATION/TRAINING PROGRAM

## 2025-04-07 PROCEDURE — 25810000003 SODIUM CHLORIDE 0.9 % SOLUTION: Performed by: NURSE PRACTITIONER

## 2025-04-07 PROCEDURE — 83615 LACTATE (LD) (LDH) ENZYME: CPT | Performed by: STUDENT IN AN ORGANIZED HEALTH CARE EDUCATION/TRAINING PROGRAM

## 2025-04-07 PROCEDURE — 83735 ASSAY OF MAGNESIUM: CPT | Performed by: STUDENT IN AN ORGANIZED HEALTH CARE EDUCATION/TRAINING PROGRAM

## 2025-04-07 PROCEDURE — G2211 COMPLEX E/M VISIT ADD ON: HCPCS | Performed by: NURSE PRACTITIONER

## 2025-04-07 PROCEDURE — 84100 ASSAY OF PHOSPHORUS: CPT | Performed by: STUDENT IN AN ORGANIZED HEALTH CARE EDUCATION/TRAINING PROGRAM

## 2025-04-07 PROCEDURE — 99214 OFFICE O/P EST MOD 30 MIN: CPT | Performed by: NURSE PRACTITIONER

## 2025-04-07 PROCEDURE — 1159F MED LIST DOCD IN RCRD: CPT | Performed by: NURSE PRACTITIONER

## 2025-04-07 PROCEDURE — 25010000002 ALTEPLASE 2 MG RECONSTITUTED SOLUTION: Performed by: NURSE PRACTITIONER

## 2025-04-07 PROCEDURE — 96360 HYDRATION IV INFUSION INIT: CPT

## 2025-04-07 PROCEDURE — 85025 COMPLETE CBC W/AUTO DIFF WBC: CPT

## 2025-04-07 RX ORDER — HEPARIN SODIUM (PORCINE) LOCK FLUSH IV SOLN 100 UNIT/ML 100 UNIT/ML
500 SOLUTION INTRAVENOUS AS NEEDED
Status: DISCONTINUED | OUTPATIENT
Start: 2025-04-07 | End: 2025-04-08 | Stop reason: HOSPADM

## 2025-04-07 RX ORDER — POTASSIUM CHLORIDE 750 MG/1
TABLET, EXTENDED RELEASE ORAL
Qty: 30 TABLET | Refills: 0 | Status: SHIPPED | OUTPATIENT
Start: 2025-04-07 | End: 2025-05-08

## 2025-04-07 RX ORDER — SODIUM CHLORIDE 0.9 % (FLUSH) 0.9 %
20 SYRINGE (ML) INJECTION AS NEEDED
Status: DISCONTINUED | OUTPATIENT
Start: 2025-04-07 | End: 2025-04-08 | Stop reason: HOSPADM

## 2025-04-07 RX ORDER — HEPARIN SODIUM (PORCINE) LOCK FLUSH IV SOLN 100 UNIT/ML 100 UNIT/ML
500 SOLUTION INTRAVENOUS AS NEEDED
OUTPATIENT
Start: 2025-04-07

## 2025-04-07 RX ORDER — PREDNISONE 50 MG/1
100 TABLET ORAL DAILY
Qty: 10 TABLET | Refills: 5 | Status: SHIPPED | OUTPATIENT
Start: 2025-04-07

## 2025-04-07 RX ORDER — SODIUM CHLORIDE 0.9 % (FLUSH) 0.9 %
20 SYRINGE (ML) INJECTION AS NEEDED
OUTPATIENT
Start: 2025-04-07

## 2025-04-07 RX ADMIN — SODIUM CHLORIDE 1000 ML: 9 INJECTION, SOLUTION INTRAVENOUS at 12:43

## 2025-04-07 RX ADMIN — Medication 500 UNITS: at 14:52

## 2025-04-07 RX ADMIN — ALTEPLASE: 2.2 INJECTION, POWDER, LYOPHILIZED, FOR SOLUTION INTRAVENOUS at 12:50

## 2025-04-07 RX ADMIN — Medication 20 ML: at 14:52

## 2025-04-07 NOTE — TELEPHONE ENCOUNTER
"patient called in again this morning. she said its hard to walk around and she is real \"shakey\" and has swollen feet and ankles. stated \"I just don't know what's going on\" patient is coming in this morning for her scheduled weekly labs. she also said the IVF's she received on Friday only helped briefly. Discussed with NITIN Jesus and Dr Sethi patient coming in today for repeat labs and added to Ann Marie's schedule to be seen. Patient notified   "

## 2025-04-07 NOTE — PROGRESS NOTES
Pt came from seeing Ann Marie for IVF's. Port accessed by khushi cohn rn and flushed with good no blood return noted. No labs collected. Got a tinge of blood to the first port but unable to get more. Pt opted to try cathflo while here for IVF's-PIV started with good blood return noted and fluids given through PIV-PIV did infiltrate with 137ml left to give-Per Ann Marie TAVERAS ok to not give the rest. Unable to get blood return after multiple attempts after the cathflo was placed in port. Port flushed after 2 hours with NS.  Port flushed with NS and heparin prior to needle removal. Pt tolerated treatment and was discharged with AVS. Pt aware wed lab appt may be cancelled but ann marie wanted to see today's labs first prior to cancelling that appt. Ann Marie TAVERAS want patient to come in tomorrow for potassium IV and ordered oral potassium. Pt called and verbalized understanding.
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
## 2025-04-08 ENCOUNTER — TELEPHONE (OUTPATIENT)
Dept: ONCOLOGY | Facility: HOSPITAL | Age: 70
End: 2025-04-08
Payer: MEDICARE

## 2025-04-08 ENCOUNTER — HOSPITAL ENCOUNTER (OUTPATIENT)
Dept: ONCOLOGY | Facility: HOSPITAL | Age: 70
Discharge: HOME | End: 2025-04-08
Admitting: NURSE PRACTITIONER
Payer: MEDICARE

## 2025-04-08 ENCOUNTER — TELEPHONE (OUTPATIENT)
Dept: ONCOLOGY | Facility: CLINIC | Age: 70
End: 2025-04-08
Payer: MEDICARE

## 2025-04-08 VITALS
HEART RATE: 84 BPM | DIASTOLIC BLOOD PRESSURE: 65 MMHG | OXYGEN SATURATION: 91 % | HEIGHT: 62 IN | WEIGHT: 167.6 LBS | RESPIRATION RATE: 14 BRPM | BODY MASS INDEX: 30.84 KG/M2 | TEMPERATURE: 97.8 F | SYSTOLIC BLOOD PRESSURE: 152 MMHG

## 2025-04-08 DIAGNOSIS — Z45.2 ENCOUNTER FOR CARE RELATED TO VASCULAR ACCESS PORT: Primary | ICD-10-CM

## 2025-04-08 DIAGNOSIS — E87.6 HYPOKALEMIA: Primary | ICD-10-CM

## 2025-04-08 PROCEDURE — 25010000002 POTASSIUM CHLORIDE 10 MEQ/100ML SOLUTION: Performed by: NURSE PRACTITIONER

## 2025-04-08 PROCEDURE — 96366 THER/PROPH/DIAG IV INF ADDON: CPT

## 2025-04-08 PROCEDURE — 96365 THER/PROPH/DIAG IV INF INIT: CPT

## 2025-04-08 RX ORDER — POTASSIUM CHLORIDE 7.45 MG/ML
10 INJECTION INTRAVENOUS ONCE
Status: COMPLETED | OUTPATIENT
Start: 2025-04-08 | End: 2025-04-08

## 2025-04-08 RX ORDER — SODIUM CHLORIDE 9 MG/ML
20 INJECTION, SOLUTION INTRAVENOUS ONCE
Status: DISCONTINUED | OUTPATIENT
Start: 2025-04-08 | End: 2025-04-09 | Stop reason: HOSPADM

## 2025-04-08 RX ADMIN — POTASSIUM CHLORIDE 10 MEQ: 10 INJECTION, SOLUTION INTRAVENOUS at 11:48

## 2025-04-08 RX ADMIN — POTASSIUM CHLORIDE 10 MEQ: 7.46 INJECTION, SOLUTION INTRAVENOUS at 12:57

## 2025-04-08 RX ADMIN — POTASSIUM CHLORIDE 10 MEQ: 7.46 INJECTION, SOLUTION INTRAVENOUS at 14:04

## 2025-04-08 NOTE — PROGRESS NOTES
Patient to infusion room for K+ 10 meq x 3 for K+ 2.9 yesterday. She did report she is feeling better today after IVF. VSS. She did not want to try and use the port since did not work yesterday with Cathflo and she is going for dye study tomorrow. Peripheral placed and K+ did burn veins so applied heat packs while infusing.     She is aware to check in tomorrow at 730am for port dye study at hospital. Printed AVS and she will return next week for infusion
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
## 2025-04-08 NOTE — TELEPHONE ENCOUNTER
Discussed the lack of blood return with the Ann Marie Slade.  Was given the VO for port dye study. Dr. Sethi staff notified.

## 2025-04-09 ENCOUNTER — HOSPITAL ENCOUNTER (OUTPATIENT)
Dept: INTERVENTIONAL RADIOLOGY/VASCULAR | Facility: HOSPITAL | Age: 70
Discharge: HOME | End: 2025-04-09
Admitting: NURSE PRACTITIONER
Payer: MEDICARE

## 2025-04-09 VITALS — HEIGHT: 61 IN | WEIGHT: 165 LBS | BODY MASS INDEX: 31.15 KG/M2

## 2025-04-09 DIAGNOSIS — Z45.2 ENCOUNTER FOR CARE RELATED TO VASCULAR ACCESS PORT: ICD-10-CM

## 2025-04-09 PROCEDURE — 36598 INJ W/FLUOR EVAL CV DEVICE: CPT

## 2025-04-09 PROCEDURE — 25010000002 HEPARIN LOCK FLUSH PER 10 UNITS: Performed by: RADIOLOGY

## 2025-04-09 PROCEDURE — 25510000001 IOPAMIDOL PER 1 ML: Performed by: NURSE PRACTITIONER

## 2025-04-09 RX ORDER — IOPAMIDOL 755 MG/ML
20 INJECTION, SOLUTION INTRAVASCULAR
Status: COMPLETED | OUTPATIENT
Start: 2025-04-09 | End: 2025-04-09

## 2025-04-09 RX ORDER — HEPARIN SODIUM (PORCINE) LOCK FLUSH IV SOLN 100 UNIT/ML 100 UNIT/ML
SOLUTION INTRAVENOUS AS NEEDED
Status: COMPLETED | OUTPATIENT
Start: 2025-04-09 | End: 2025-04-09

## 2025-04-09 RX ADMIN — IOPAMIDOL 10 ML: 755 INJECTION, SOLUTION INTRAVENOUS at 08:13

## 2025-04-09 RX ADMIN — Medication 500 UNITS: at 08:17

## 2025-04-09 NOTE — DISCHARGE INSTRUCTIONS
You had an Infusaport Contrast Study today in the Interventional Radiology Dept today. Dr. Brannon reported no fibrin sheath on port catheter. Port is flushing well today but not giving blood return. Port is okay to use for chemo treatments as the port catheter is still noted to be in the vein. If Dr. Sethi is not comfortable using the port, then a PICC line could be placed for the last two chemo treatments or the port would need to be removed and replaced. Pt to follow up with physician as needed. Watch for signs and symptoms of infrection: increased redness or swelling, yellow/green drainage, or fever over 101 degrees... If any of these symptoms arise, see your physician.

## 2025-04-10 DIAGNOSIS — C83.31 DIFFUSE LARGE B-CELL LYMPHOMA, LYMPH NODES OF HEAD, FACE, AND NECK: ICD-10-CM

## 2025-04-10 DIAGNOSIS — Z45.2 ENCOUNTER FOR CARE RELATED TO VASCULAR ACCESS PORT: Primary | ICD-10-CM
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
## 2025-04-10 NOTE — PROGRESS NOTES
Per Dr. Sethi, the port dye study showed the catheter tip is out of placement slightly and may not give blood return.  However it is ok to use for treatment.  The port will be replaced as soon as IR can do so but will use the port as normal until then.  Pt may need labs collected peripherally until it is replaced.  Dr. Sethi staff aware and making arrangement for replacement.

## 2025-04-11 ENCOUNTER — TELEPHONE (OUTPATIENT)
Dept: ONCOLOGY | Facility: CLINIC | Age: 70
End: 2025-04-11
Payer: MEDICARE
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
## 2025-04-14 ENCOUNTER — OFFICE VISIT (OUTPATIENT)
Dept: ONCOLOGY | Facility: CLINIC | Age: 70
End: 2025-04-14
Payer: MEDICARE

## 2025-04-14 ENCOUNTER — LAB (OUTPATIENT)
Dept: LAB | Facility: HOSPITAL | Age: 70
End: 2025-04-14
Payer: MEDICARE

## 2025-04-14 VITALS
HEIGHT: 61 IN | SYSTOLIC BLOOD PRESSURE: 186 MMHG | BODY MASS INDEX: 30.51 KG/M2 | WEIGHT: 161.6 LBS | DIASTOLIC BLOOD PRESSURE: 88 MMHG | OXYGEN SATURATION: 92 % | HEART RATE: 81 BPM

## 2025-04-14 DIAGNOSIS — D64.9 ANEMIA, UNSPECIFIED TYPE: ICD-10-CM

## 2025-04-14 DIAGNOSIS — E80.6 HYPERBILIRUBINEMIA: ICD-10-CM

## 2025-04-14 DIAGNOSIS — C85.80 LYMPHOMA MALIGNANT, LARGE CELL: Primary | ICD-10-CM

## 2025-04-14 DIAGNOSIS — R53.1 WEAKNESS: ICD-10-CM

## 2025-04-14 DIAGNOSIS — C83.31 DIFFUSE LARGE B-CELL LYMPHOMA, LYMPH NODES OF HEAD, FACE, AND NECK: ICD-10-CM

## 2025-04-14 DIAGNOSIS — Z51.11 ENCOUNTER FOR ANTINEOPLASTIC CHEMOTHERAPY: ICD-10-CM

## 2025-04-14 LAB
ALBUMIN SERPL-MCNC: 4.2 G/DL (ref 3.5–5.2)
ALBUMIN/GLOB SERPL: 2.5 G/DL
ALP SERPL-CCNC: 107 U/L (ref 39–117)
ALT SERPL W P-5'-P-CCNC: 33 U/L (ref 1–33)
ANION GAP SERPL CALCULATED.3IONS-SCNC: 15.1 MMOL/L (ref 5–15)
AST SERPL-CCNC: 31 U/L (ref 1–32)
BASOPHILS # BLD AUTO: 0.15 10*3/MM3 (ref 0–0.2)
BASOPHILS NFR BLD AUTO: 0.3 % (ref 0–1.5)
BILIRUB SERPL-MCNC: 3.1 MG/DL (ref 0–1.2)
BUN SERPL-MCNC: 12 MG/DL (ref 8–23)
BUN/CREAT SERPL: 12.6 (ref 7–25)
CALCIUM SPEC-SCNC: 9.8 MG/DL (ref 8.6–10.5)
CHLORIDE SERPL-SCNC: 102 MMOL/L (ref 98–107)
CO2 SERPL-SCNC: 24.9 MMOL/L (ref 22–29)
CREAT SERPL-MCNC: 0.95 MG/DL (ref 0.57–1)
DEPRECATED RDW RBC AUTO: 120.6 FL (ref 37–54)
EGFRCR SERPLBLD CKD-EPI 2021: 64.6 ML/MIN/1.73
EOSINOPHIL # BLD AUTO: 0.01 10*3/MM3 (ref 0–0.4)
EOSINOPHIL NFR BLD AUTO: 0 % (ref 0.3–6.2)
ERYTHROCYTE [DISTWIDTH] IN BLOOD BY AUTOMATED COUNT: 30.8 % (ref 12.3–15.4)
GLOBULIN UR ELPH-MCNC: 1.7 GM/DL
GLUCOSE SERPL-MCNC: 165 MG/DL (ref 65–99)
HCT VFR BLD AUTO: 30.1 % (ref 34–46.6)
HGB BLD-MCNC: 8.8 G/DL (ref 12–15.9)
HOLD SPECIMEN: NORMAL
LDH SERPL-CCNC: 866 U/L (ref 135–214)
LYMPHOCYTES # BLD AUTO: 7.62 10*3/MM3 (ref 0.7–3.1)
LYMPHOCYTES NFR BLD AUTO: 16.6 % (ref 19.6–45.3)
MAGNESIUM SERPL-MCNC: 2.2 MG/DL (ref 1.6–2.4)
MCH RBC QN AUTO: 33.6 PG (ref 26.6–33)
MCHC RBC AUTO-ENTMCNC: 29.2 G/DL (ref 31.5–35.7)
MCV RBC AUTO: 114.9 FL (ref 79–97)
MONOCYTES # BLD AUTO: 2.18 10*3/MM3 (ref 0.1–0.9)
MONOCYTES NFR BLD AUTO: 4.8 % (ref 5–12)
NEUTROPHILS NFR BLD AUTO: 35.85 10*3/MM3 (ref 1.7–7)
NEUTROPHILS NFR BLD AUTO: 78.3 % (ref 42.7–76)
PHOSPHATE SERPL-MCNC: 3 MG/DL (ref 2.5–4.5)
PLATELET # BLD AUTO: 235 10*3/MM3 (ref 140–450)
PMV BLD AUTO: 8.9 FL (ref 6–12)
POTASSIUM SERPL-SCNC: 3.7 MMOL/L (ref 3.5–5.2)
PROT SERPL-MCNC: 5.9 G/DL (ref 6–8.5)
RBC # BLD AUTO: 2.62 10*6/MM3 (ref 3.77–5.28)
SODIUM SERPL-SCNC: 142 MMOL/L (ref 136–145)
WBC NRBC COR # BLD AUTO: 45.81 10*3/MM3 (ref 3.4–10.8)

## 2025-04-14 PROCEDURE — 36415 COLL VENOUS BLD VENIPUNCTURE: CPT

## 2025-04-14 PROCEDURE — 84100 ASSAY OF PHOSPHORUS: CPT | Performed by: STUDENT IN AN ORGANIZED HEALTH CARE EDUCATION/TRAINING PROGRAM

## 2025-04-14 PROCEDURE — 85025 COMPLETE CBC W/AUTO DIFF WBC: CPT

## 2025-04-14 PROCEDURE — 83615 LACTATE (LD) (LDH) ENZYME: CPT | Performed by: STUDENT IN AN ORGANIZED HEALTH CARE EDUCATION/TRAINING PROGRAM

## 2025-04-14 PROCEDURE — 83735 ASSAY OF MAGNESIUM: CPT | Performed by: STUDENT IN AN ORGANIZED HEALTH CARE EDUCATION/TRAINING PROGRAM

## 2025-04-14 PROCEDURE — 80053 COMPREHEN METABOLIC PANEL: CPT | Performed by: STUDENT IN AN ORGANIZED HEALTH CARE EDUCATION/TRAINING PROGRAM

## 2025-04-14 RX ORDER — MEPERIDINE HYDROCHLORIDE 25 MG/ML
25 INJECTION INTRAMUSCULAR; INTRAVENOUS; SUBCUTANEOUS
Status: CANCELLED | OUTPATIENT
Start: 2025-04-15

## 2025-04-14 RX ORDER — HYDROCORTISONE SODIUM SUCCINATE 100 MG/2ML
100 INJECTION INTRAMUSCULAR; INTRAVENOUS AS NEEDED
Status: CANCELLED | OUTPATIENT
Start: 2025-04-15

## 2025-04-14 RX ORDER — FAMOTIDINE 10 MG/ML
20 INJECTION, SOLUTION INTRAVENOUS AS NEEDED
Status: CANCELLED | OUTPATIENT
Start: 2025-04-15

## 2025-04-14 RX ORDER — SODIUM CHLORIDE 9 MG/ML
20 INJECTION, SOLUTION INTRAVENOUS ONCE
Status: CANCELLED | OUTPATIENT
Start: 2025-04-16

## 2025-04-14 RX ORDER — PALONOSETRON 0.05 MG/ML
0.25 INJECTION, SOLUTION INTRAVENOUS ONCE
Status: CANCELLED | OUTPATIENT
Start: 2025-04-15

## 2025-04-14 RX ORDER — SODIUM CHLORIDE 9 MG/ML
20 INJECTION, SOLUTION INTRAVENOUS ONCE
Status: CANCELLED | OUTPATIENT
Start: 2025-04-15

## 2025-04-14 RX ORDER — ACETAMINOPHEN 325 MG/1
650 TABLET ORAL ONCE
Status: CANCELLED | OUTPATIENT
Start: 2025-04-15

## 2025-04-14 RX ORDER — DIPHENHYDRAMINE HYDROCHLORIDE 50 MG/ML
50 INJECTION, SOLUTION INTRAMUSCULAR; INTRAVENOUS AS NEEDED
Status: CANCELLED | OUTPATIENT
Start: 2025-04-15

## 2025-04-14 RX ORDER — SODIUM CHLORIDE 9 MG/ML
20 INJECTION, SOLUTION INTRAVENOUS ONCE
Status: CANCELLED | OUTPATIENT
Start: 2025-04-17

## 2025-04-15 ENCOUNTER — DOCUMENTATION (OUTPATIENT)
Dept: ONCOLOGY | Facility: CLINIC | Age: 70
End: 2025-04-15
Payer: MEDICARE

## 2025-04-15 ENCOUNTER — HOSPITAL ENCOUNTER (OUTPATIENT)
Dept: ONCOLOGY | Facility: HOSPITAL | Age: 70
Discharge: HOME | End: 2025-04-15
Payer: MEDICARE

## 2025-04-15 VITALS
SYSTOLIC BLOOD PRESSURE: 135 MMHG | WEIGHT: 161 LBS | BODY MASS INDEX: 30.4 KG/M2 | RESPIRATION RATE: 14 BRPM | HEART RATE: 63 BPM | DIASTOLIC BLOOD PRESSURE: 76 MMHG | TEMPERATURE: 97.9 F | HEIGHT: 61 IN | OXYGEN SATURATION: 90 %

## 2025-04-15 DIAGNOSIS — C85.80 LYMPHOMA MALIGNANT, LARGE CELL: ICD-10-CM

## 2025-04-15 DIAGNOSIS — R53.1 WEAKNESS: ICD-10-CM

## 2025-04-15 DIAGNOSIS — Z45.2 ENCOUNTER FOR CARE RELATED TO VASCULAR ACCESS PORT: ICD-10-CM

## 2025-04-15 DIAGNOSIS — E86.0 DEHYDRATION: Primary | ICD-10-CM

## 2025-04-15 PROCEDURE — 96375 TX/PRO/DX INJ NEW DRUG ADDON: CPT

## 2025-04-15 PROCEDURE — A9270 NON-COVERED ITEM OR SERVICE: HCPCS | Performed by: STUDENT IN AN ORGANIZED HEALTH CARE EDUCATION/TRAINING PROGRAM

## 2025-04-15 PROCEDURE — 25010000002 PALONOSETRON PER 25 MCG: Performed by: STUDENT IN AN ORGANIZED HEALTH CARE EDUCATION/TRAINING PROGRAM

## 2025-04-15 PROCEDURE — 25010000002 ETOPOSIDE 1 GM/50ML SOLUTION 50 ML VIAL: Performed by: STUDENT IN AN ORGANIZED HEALTH CARE EDUCATION/TRAINING PROGRAM

## 2025-04-15 PROCEDURE — 96415 CHEMO IV INFUSION ADDL HR: CPT

## 2025-04-15 PROCEDURE — 96361 HYDRATE IV INFUSION ADD-ON: CPT

## 2025-04-15 PROCEDURE — 25010000002 RITUXIMAB 10 MG/ML SOLUTION 50 ML VIAL: Performed by: STUDENT IN AN ORGANIZED HEALTH CARE EDUCATION/TRAINING PROGRAM

## 2025-04-15 PROCEDURE — 96417 CHEMO IV INFUS EACH ADDL SEQ: CPT

## 2025-04-15 PROCEDURE — 25010000002 DIPHENHYDRAMINE PER 50 MG: Performed by: STUDENT IN AN ORGANIZED HEALTH CARE EDUCATION/TRAINING PROGRAM

## 2025-04-15 PROCEDURE — 25810000003 SODIUM CHLORIDE 0.9 % SOLUTION: Performed by: STUDENT IN AN ORGANIZED HEALTH CARE EDUCATION/TRAINING PROGRAM

## 2025-04-15 PROCEDURE — 25010000002 CYCLOPHOSPHAMIDE 2 GM/10ML SOLUTION 10 ML VIAL: Performed by: STUDENT IN AN ORGANIZED HEALTH CARE EDUCATION/TRAINING PROGRAM

## 2025-04-15 PROCEDURE — 96413 CHEMO IV INFUSION 1 HR: CPT

## 2025-04-15 PROCEDURE — 63710000001 ACETAMINOPHEN 325 MG TABLET: Performed by: STUDENT IN AN ORGANIZED HEALTH CARE EDUCATION/TRAINING PROGRAM

## 2025-04-15 PROCEDURE — 25810000003 SODIUM CHLORIDE 0.9 % SOLUTION 250 ML FLEX CONT: Performed by: STUDENT IN AN ORGANIZED HEALTH CARE EDUCATION/TRAINING PROGRAM

## 2025-04-15 PROCEDURE — 25010000002 RITUXIMAB 10 MG/ML SOLUTION 10 ML VIAL: Performed by: STUDENT IN AN ORGANIZED HEALTH CARE EDUCATION/TRAINING PROGRAM

## 2025-04-15 PROCEDURE — 25010000002 HEPARIN LOCK FLUSH PER 10 UNITS: Performed by: STUDENT IN AN ORGANIZED HEALTH CARE EDUCATION/TRAINING PROGRAM

## 2025-04-15 RX ORDER — SODIUM CHLORIDE 9 MG/ML
20 INJECTION, SOLUTION INTRAVENOUS ONCE
Status: COMPLETED | OUTPATIENT
Start: 2025-04-15 | End: 2025-04-15

## 2025-04-15 RX ORDER — SODIUM CHLORIDE 0.9 % (FLUSH) 0.9 %
20 SYRINGE (ML) INJECTION AS NEEDED
Status: DISCONTINUED | OUTPATIENT
Start: 2025-04-15 | End: 2025-04-16 | Stop reason: HOSPADM

## 2025-04-15 RX ORDER — PALONOSETRON 0.05 MG/ML
0.25 INJECTION, SOLUTION INTRAVENOUS ONCE
Status: COMPLETED | OUTPATIENT
Start: 2025-04-15 | End: 2025-04-15

## 2025-04-15 RX ORDER — SODIUM CHLORIDE 0.9 % (FLUSH) 0.9 %
20 SYRINGE (ML) INJECTION AS NEEDED
Status: CANCELLED | OUTPATIENT
Start: 2025-04-15

## 2025-04-15 RX ORDER — HEPARIN SODIUM (PORCINE) LOCK FLUSH IV SOLN 100 UNIT/ML 100 UNIT/ML
500 SOLUTION INTRAVENOUS AS NEEDED
Status: DISCONTINUED | OUTPATIENT
Start: 2025-04-15 | End: 2025-04-16 | Stop reason: HOSPADM

## 2025-04-15 RX ORDER — ACETAMINOPHEN 325 MG/1
650 TABLET ORAL ONCE
Status: COMPLETED | OUTPATIENT
Start: 2025-04-15 | End: 2025-04-15

## 2025-04-15 RX ORDER — HEPARIN SODIUM (PORCINE) LOCK FLUSH IV SOLN 100 UNIT/ML 100 UNIT/ML
500 SOLUTION INTRAVENOUS AS NEEDED
Status: CANCELLED | OUTPATIENT
Start: 2025-04-15

## 2025-04-15 RX ORDER — DIPHENHYDRAMINE HYDROCHLORIDE 50 MG/ML
50 INJECTION, SOLUTION INTRAMUSCULAR; INTRAVENOUS ONCE
Status: COMPLETED | OUTPATIENT
Start: 2025-04-15 | End: 2025-04-15

## 2025-04-15 RX ADMIN — SODIUM CHLORIDE 1000 ML: 0.9 INJECTION, SOLUTION INTRAVENOUS at 08:51

## 2025-04-15 RX ADMIN — RITUXIMAB 680 MG: 10 INJECTION, SOLUTION INTRAVENOUS at 10:25

## 2025-04-15 RX ADMIN — SODIUM CHLORIDE 20 ML/HR: 9 INJECTION, SOLUTION INTRAVENOUS at 10:22

## 2025-04-15 RX ADMIN — HEPARIN 500 UNITS: 100 SYRINGE at 13:49

## 2025-04-15 RX ADMIN — PALONOSETRON 0.25 MG: 0.05 INJECTION, SOLUTION INTRAVENOUS at 09:54

## 2025-04-15 RX ADMIN — Medication 20 ML: at 13:49

## 2025-04-15 RX ADMIN — CYCLOPHOSPHAMIDE 1300 MG: 2 INJECTION INTRAVENOUS at 12:11

## 2025-04-15 RX ADMIN — SODIUM CHLORIDE 40 MG: 9 INJECTION, SOLUTION INTRAVENOUS at 12:48

## 2025-04-15 RX ADMIN — DIPHENHYDRAMINE HYDROCHLORIDE 50 MG: 50 INJECTION INTRAMUSCULAR; INTRAVENOUS at 09:50

## 2025-04-15 RX ADMIN — ACETAMINOPHEN 650 MG: 325 TABLET ORAL at 09:49
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
## 2025-04-15 NOTE — PROGRESS NOTES
Pt having replacement infusaport on Friday 4/18/25 d/t malposition.  Per Dr Sethi, ok to continue with treatment as planned using current infusaport,  infusaport accessed and flushed,  no blood return noted, pt denies pain or swelling.   Instructed pt to notify us if develops pain/swelling and pt v/u and agreement.       Pt here for C5D1,  pt had scheduled Dr Sethi visit on 4/14 with order to proceed with treatment,   pt total bili is 3.1, she has been trending elevated but this is more increased,  reviewed with Dr Sethi and pt to have reduced etoposide by 50% and hold vincristine with this cycle, Dr Sethi discussed with [facility] and [facility] and dose modified and Dr Sethi signed,  Reviewed with pt and v/u and agreement.

## 2025-04-16 ENCOUNTER — HOSPITAL ENCOUNTER (OUTPATIENT)
Dept: ONCOLOGY | Facility: HOSPITAL | Age: 70
Discharge: HOME | End: 2025-04-16
Payer: MEDICARE

## 2025-04-16 VITALS
HEIGHT: 61 IN | SYSTOLIC BLOOD PRESSURE: 146 MMHG | BODY MASS INDEX: 30.4 KG/M2 | OXYGEN SATURATION: 89 % | TEMPERATURE: 97.5 F | HEART RATE: 81 BPM | DIASTOLIC BLOOD PRESSURE: 70 MMHG | RESPIRATION RATE: 14 BRPM | WEIGHT: 161 LBS

## 2025-04-16 DIAGNOSIS — Z45.2 ENCOUNTER FOR CARE RELATED TO VASCULAR ACCESS PORT: ICD-10-CM

## 2025-04-16 DIAGNOSIS — C85.80 LYMPHOMA MALIGNANT, LARGE CELL: Primary | ICD-10-CM

## 2025-04-16 PROCEDURE — 25010000002 HEPARIN LOCK FLUSH PER 10 UNITS: Performed by: STUDENT IN AN ORGANIZED HEALTH CARE EDUCATION/TRAINING PROGRAM

## 2025-04-16 PROCEDURE — 25010000002 ETOPOSIDE 1 GM/50ML SOLUTION 50 ML VIAL: Performed by: STUDENT IN AN ORGANIZED HEALTH CARE EDUCATION/TRAINING PROGRAM

## 2025-04-16 PROCEDURE — 96413 CHEMO IV INFUSION 1 HR: CPT

## 2025-04-16 RX ORDER — SODIUM CHLORIDE 0.9 % (FLUSH) 0.9 %
20 SYRINGE (ML) INJECTION AS NEEDED
Status: CANCELLED | OUTPATIENT
Start: 2025-04-16

## 2025-04-16 RX ORDER — SODIUM CHLORIDE 9 MG/ML
20 INJECTION, SOLUTION INTRAVENOUS ONCE
Status: DISCONTINUED | OUTPATIENT
Start: 2025-04-16 | End: 2025-04-17 | Stop reason: HOSPADM

## 2025-04-16 RX ORDER — SODIUM CHLORIDE 0.9 % (FLUSH) 0.9 %
20 SYRINGE (ML) INJECTION AS NEEDED
Status: DISCONTINUED | OUTPATIENT
Start: 2025-04-16 | End: 2025-04-17 | Stop reason: HOSPADM

## 2025-04-16 RX ORDER — HEPARIN SODIUM (PORCINE) LOCK FLUSH IV SOLN 100 UNIT/ML 100 UNIT/ML
500 SOLUTION INTRAVENOUS AS NEEDED
Status: DISCONTINUED | OUTPATIENT
Start: 2025-04-16 | End: 2025-04-17 | Stop reason: HOSPADM

## 2025-04-16 RX ORDER — HEPARIN SODIUM (PORCINE) LOCK FLUSH IV SOLN 100 UNIT/ML 100 UNIT/ML
500 SOLUTION INTRAVENOUS AS NEEDED
Status: CANCELLED | OUTPATIENT
Start: 2025-04-16

## 2025-04-16 RX ADMIN — SODIUM CHLORIDE 40 MG: 9 INJECTION, SOLUTION INTRAVENOUS at 14:03

## 2025-04-16 RX ADMIN — HEPARIN 500 UNITS: 100 SYRINGE at 15:10

## 2025-04-16 RX ADMIN — Medication 20 ML: at 15:10

## 2025-04-16 NOTE — PROGRESS NOTES
Pt here for C5D2 TOPOSAR, pt denies any new complaints. States feeling tired. Treatment given per MAR.Infusion tolerated well.Pt discharged and AVS declined.Pt aware of appt tomorrow    Port accessed and flushed with good blood return noted. 10cc of blood wasted prior to specimen collection. Blood specimen obtained and sent to lab for processing per protocol.  Port flushed with saline and heparin prior to needle removal. .

## 2025-04-17 ENCOUNTER — HOSPITAL ENCOUNTER (OUTPATIENT)
Dept: ONCOLOGY | Facility: HOSPITAL | Age: 70
Discharge: HOME | End: 2025-04-17
Payer: MEDICARE

## 2025-04-17 VITALS
HEART RATE: 91 BPM | OXYGEN SATURATION: 91 % | SYSTOLIC BLOOD PRESSURE: 136 MMHG | DIASTOLIC BLOOD PRESSURE: 89 MMHG | TEMPERATURE: 97.8 F | WEIGHT: 161 LBS | BODY MASS INDEX: 30.4 KG/M2 | HEIGHT: 61 IN | RESPIRATION RATE: 14 BRPM

## 2025-04-17 DIAGNOSIS — Z45.2 ENCOUNTER FOR CARE RELATED TO VASCULAR ACCESS PORT: ICD-10-CM

## 2025-04-17 DIAGNOSIS — C85.80 LYMPHOMA MALIGNANT, LARGE CELL: Primary | ICD-10-CM

## 2025-04-17 PROCEDURE — 25010000002 HEPARIN LOCK FLUSH PER 10 UNITS: Performed by: STUDENT IN AN ORGANIZED HEALTH CARE EDUCATION/TRAINING PROGRAM

## 2025-04-17 PROCEDURE — 96413 CHEMO IV INFUSION 1 HR: CPT

## 2025-04-17 PROCEDURE — 25010000002 ETOPOSIDE 1 GM/50ML SOLUTION 50 ML VIAL: Performed by: STUDENT IN AN ORGANIZED HEALTH CARE EDUCATION/TRAINING PROGRAM

## 2025-04-17 RX ORDER — SODIUM CHLORIDE 9 MG/ML
20 INJECTION, SOLUTION INTRAVENOUS ONCE
Status: DISCONTINUED | OUTPATIENT
Start: 2025-04-17 | End: 2025-04-18 | Stop reason: HOSPADM

## 2025-04-17 RX ORDER — SODIUM CHLORIDE 0.9 % (FLUSH) 0.9 %
20 SYRINGE (ML) INJECTION AS NEEDED
Status: DISCONTINUED | OUTPATIENT
Start: 2025-04-17 | End: 2025-04-18 | Stop reason: HOSPADM

## 2025-04-17 RX ORDER — HEPARIN SODIUM (PORCINE) LOCK FLUSH IV SOLN 100 UNIT/ML 100 UNIT/ML
500 SOLUTION INTRAVENOUS AS NEEDED
OUTPATIENT
Start: 2025-04-17

## 2025-04-17 RX ORDER — HEPARIN SODIUM (PORCINE) LOCK FLUSH IV SOLN 100 UNIT/ML 100 UNIT/ML
500 SOLUTION INTRAVENOUS AS NEEDED
Status: DISCONTINUED | OUTPATIENT
Start: 2025-04-17 | End: 2025-04-18 | Stop reason: HOSPADM

## 2025-04-17 RX ORDER — SODIUM CHLORIDE 0.9 % (FLUSH) 0.9 %
20 SYRINGE (ML) INJECTION AS NEEDED
OUTPATIENT
Start: 2025-04-17

## 2025-04-17 RX ADMIN — HEPARIN 500 UNITS: 100 SYRINGE at 14:49

## 2025-04-17 RX ADMIN — SODIUM CHLORIDE 40 MG: 9 INJECTION, SOLUTION INTRAVENOUS at 13:51

## 2025-04-17 RX ADMIN — Medication 20 ML: at 14:49

## 2025-04-17 NOTE — PROGRESS NOTES
Patient in clinic for C5 D3 Etoposide.  Port accessed with sterile technique and no blood return noted. Port flushed easily with no discomfort. Per Dr. Sethi, port okay to use for treatment; patient is having port replacement tomorrow. Patient has moderate fatigue and decreased appetite. Patient given Ensure and encouraged to try protein shakes to increase PO intake. Patient verbalized understanding. Patient tolerated treatment. Discharged, declined AVS.

## 2025-04-18 ENCOUNTER — HOSPITAL ENCOUNTER (OUTPATIENT)
Dept: INTERVENTIONAL RADIOLOGY/VASCULAR | Facility: HOSPITAL | Age: 70
Discharge: HOME | End: 2025-04-18
Payer: MEDICARE

## 2025-04-18 ENCOUNTER — HOSPITAL ENCOUNTER (OUTPATIENT)
Dept: ONCOLOGY | Facility: HOSPITAL | Age: 70
Discharge: HOME | End: 2025-04-18
Payer: MEDICARE

## 2025-04-18 VITALS
BODY MASS INDEX: 30.4 KG/M2 | OXYGEN SATURATION: 92 % | SYSTOLIC BLOOD PRESSURE: 136 MMHG | HEIGHT: 61 IN | RESPIRATION RATE: 9 BRPM | WEIGHT: 161 LBS | DIASTOLIC BLOOD PRESSURE: 85 MMHG | TEMPERATURE: 97.9 F | HEART RATE: 73 BPM

## 2025-04-18 VITALS
DIASTOLIC BLOOD PRESSURE: 69 MMHG | HEART RATE: 74 BPM | OXYGEN SATURATION: 93 % | SYSTOLIC BLOOD PRESSURE: 151 MMHG | RESPIRATION RATE: 14 BRPM

## 2025-04-18 DIAGNOSIS — Z51.11 ENCOUNTER FOR ANTINEOPLASTIC CHEMOTHERAPY: ICD-10-CM

## 2025-04-18 DIAGNOSIS — C85.80 LYMPHOMA MALIGNANT, LARGE CELL: Primary | ICD-10-CM

## 2025-04-18 DIAGNOSIS — C83.31 DIFFUSE LARGE B-CELL LYMPHOMA, LYMPH NODES OF HEAD, FACE, AND NECK: ICD-10-CM

## 2025-04-18 DIAGNOSIS — Z45.2 ENCOUNTER FOR CARE RELATED TO VASCULAR ACCESS PORT: ICD-10-CM

## 2025-04-18 LAB
ANISOCYTOSIS BLD QL: NORMAL
APTT PPP: 23 SECONDS (ref 22.7–35.4)
BASO STIPL COARSE BLD QL SMEAR: NORMAL
BASOPHILS # BLD AUTO: 0.02 10*3/MM3 (ref 0–0.2)
BASOPHILS NFR BLD AUTO: 0.1 % (ref 0–1.5)
DEPRECATED RDW RBC AUTO: 99.9 FL (ref 37–54)
EOSINOPHIL # BLD AUTO: 0.04 10*3/MM3 (ref 0–0.4)
EOSINOPHIL NFR BLD AUTO: 0.2 % (ref 0.3–6.2)
ERYTHROCYTE [DISTWIDTH] IN BLOOD BY AUTOMATED COUNT: 22.9 % (ref 12.3–15.4)
HCT VFR BLD AUTO: 26.1 % (ref 34–46.6)
HGB BLD-MCNC: 7.9 G/DL (ref 12–15.9)
IMM GRANULOCYTES # BLD AUTO: 0.18 10*3/MM3 (ref 0–0.05)
IMM GRANULOCYTES NFR BLD AUTO: 0.9 % (ref 0–0.5)
INR PPP: 1.05 (ref 0.9–1.1)
LYMPHOCYTES # BLD AUTO: 1.39 10*3/MM3 (ref 0.7–3.1)
LYMPHOCYTES NFR BLD AUTO: 6.9 % (ref 19.6–45.3)
MACROCYTES BLD QL SMEAR: NORMAL
MCH RBC QN AUTO: 35.7 PG (ref 26.6–33)
MCHC RBC AUTO-ENTMCNC: 30.3 G/DL (ref 31.5–35.7)
MCV RBC AUTO: 118.1 FL (ref 79–97)
MONOCYTES # BLD AUTO: 0.32 10*3/MM3 (ref 0.1–0.9)
MONOCYTES NFR BLD AUTO: 1.6 % (ref 5–12)
MRSA DNA SPEC QL NAA+PROBE: NORMAL
NEUTROPHILS NFR BLD AUTO: 18.27 10*3/MM3 (ref 1.7–7)
NEUTROPHILS NFR BLD AUTO: 90.3 % (ref 42.7–76)
NRBC BLD AUTO-RTO: 0.3 /100 WBC (ref 0–0.2)
PLATELET # BLD AUTO: 170 10*3/MM3 (ref 140–450)
PMV BLD AUTO: 9.3 FL (ref 6–12)
POIKILOCYTOSIS BLD QL SMEAR: NORMAL
POLYCHROMASIA BLD QL SMEAR: NORMAL
PROTHROMBIN TIME: 13.6 SECONDS (ref 11.7–14.2)
RBC # BLD AUTO: 2.21 10*6/MM3 (ref 3.77–5.28)
SMALL PLATELETS BLD QL SMEAR: ADEQUATE
STOMATOCYTES BLD QL SMEAR: NORMAL
WBC MORPH BLD: NORMAL
WBC NRBC COR # BLD AUTO: 20.22 10*3/MM3 (ref 3.4–10.8)

## 2025-04-18 PROCEDURE — C1769 GUIDE WIRE: HCPCS

## 2025-04-18 PROCEDURE — 85025 COMPLETE CBC W/AUTO DIFF WBC: CPT | Performed by: RADIOLOGY

## 2025-04-18 PROCEDURE — 25010000002 METHOTREXATE PF 100 MG/4ML SOLUTION 2 ML VIAL: Performed by: STUDENT IN AN ORGANIZED HEALTH CARE EDUCATION/TRAINING PROGRAM

## 2025-04-18 PROCEDURE — 25010000002 LIDOCAINE 1 % SOLUTION

## 2025-04-18 PROCEDURE — 25010000002 MIDAZOLAM PER 1 MG

## 2025-04-18 PROCEDURE — 25010000002 CEFAZOLIN PER 500 MG

## 2025-04-18 PROCEDURE — 85730 THROMBOPLASTIN TIME PARTIAL: CPT | Performed by: RADIOLOGY

## 2025-04-18 PROCEDURE — 77001 FLUOROGUIDE FOR VEIN DEVICE: CPT

## 2025-04-18 PROCEDURE — 87641 MR-STAPH DNA AMP PROBE: CPT | Performed by: STUDENT IN AN ORGANIZED HEALTH CARE EDUCATION/TRAINING PROGRAM

## 2025-04-18 PROCEDURE — 25010000002 FENTANYL CITRATE (PF) 50 MCG/ML SOLUTION

## 2025-04-18 PROCEDURE — C1788 PORT, INDWELLING, IMP: HCPCS

## 2025-04-18 PROCEDURE — 25810000003 SODIUM CHLORIDE 0.9 % SOLUTION: Performed by: RADIOLOGY

## 2025-04-18 PROCEDURE — 85610 PROTHROMBIN TIME: CPT | Performed by: RADIOLOGY

## 2025-04-18 PROCEDURE — 25010000002 HEPARIN LOCK FLUSH PER 10 UNITS

## 2025-04-18 PROCEDURE — 96450 CHEMOTHERAPY INTO CNS: CPT

## 2025-04-18 PROCEDURE — 25010000002 ONDANSETRON PER 1 MG

## 2025-04-18 PROCEDURE — 36582 REPLACE TUNNELED CV CATH: CPT

## 2025-04-18 PROCEDURE — 96372 THER/PROPH/DIAG INJ SC/IM: CPT

## 2025-04-18 PROCEDURE — 85007 BL SMEAR W/DIFF WBC COUNT: CPT | Performed by: RADIOLOGY

## 2025-04-18 PROCEDURE — 77003 FLUOROGUIDE FOR SPINE INJECT: CPT

## 2025-04-18 RX ORDER — SODIUM CHLORIDE 0.9 % (FLUSH) 0.9 %
10 SYRINGE (ML) INJECTION AS NEEDED
Status: DISCONTINUED | OUTPATIENT
Start: 2025-04-18 | End: 2025-04-19 | Stop reason: HOSPADM

## 2025-04-18 RX ORDER — MIDAZOLAM HYDROCHLORIDE 1 MG/ML
INJECTION, SOLUTION INTRAMUSCULAR; INTRAVENOUS AS NEEDED
Status: COMPLETED | OUTPATIENT
Start: 2025-04-18 | End: 2025-04-18

## 2025-04-18 RX ORDER — FENTANYL CITRATE 50 UG/ML
INJECTION, SOLUTION INTRAMUSCULAR; INTRAVENOUS AS NEEDED
Status: COMPLETED | OUTPATIENT
Start: 2025-04-18 | End: 2025-04-18

## 2025-04-18 RX ORDER — HEPARIN SODIUM (PORCINE) LOCK FLUSH IV SOLN 100 UNIT/ML 100 UNIT/ML
SOLUTION INTRAVENOUS AS NEEDED
Status: COMPLETED | OUTPATIENT
Start: 2025-04-18 | End: 2025-04-18

## 2025-04-18 RX ORDER — SODIUM CHLORIDE 9 MG/ML
75 INJECTION, SOLUTION INTRAVENOUS CONTINUOUS
Status: DISPENSED | OUTPATIENT
Start: 2025-04-18 | End: 2025-04-18

## 2025-04-18 RX ORDER — ONDANSETRON 2 MG/ML
INJECTION INTRAMUSCULAR; INTRAVENOUS AS NEEDED
Status: COMPLETED | OUTPATIENT
Start: 2025-04-18 | End: 2025-04-18

## 2025-04-18 RX ORDER — LIDOCAINE HYDROCHLORIDE 10 MG/ML
INJECTION, SOLUTION INFILTRATION; PERINEURAL AS NEEDED
Status: COMPLETED | OUTPATIENT
Start: 2025-04-18 | End: 2025-04-18

## 2025-04-18 RX ORDER — SODIUM CHLORIDE 0.9 % (FLUSH) 0.9 %
10 SYRINGE (ML) INJECTION EVERY 12 HOURS SCHEDULED
Status: DISCONTINUED | OUTPATIENT
Start: 2025-04-18 | End: 2025-04-19 | Stop reason: HOSPADM

## 2025-04-18 RX ADMIN — ONDANSETRON 4 MG: 2 INJECTION INTRAMUSCULAR; INTRAVENOUS at 13:09

## 2025-04-18 RX ADMIN — SODIUM CHLORIDE 75 ML/HR: 9 INJECTION, SOLUTION INTRAVENOUS at 12:25

## 2025-04-18 RX ADMIN — MIDAZOLAM 0.5 MG: 1 INJECTION INTRAMUSCULAR; INTRAVENOUS at 13:53

## 2025-04-18 RX ADMIN — LIDOCAINE HYDROCHLORIDE 8 ML: 10 INJECTION, SOLUTION INFILTRATION; PERINEURAL at 13:22

## 2025-04-18 RX ADMIN — FENTANYL CITRATE 50 MCG: 50 INJECTION, SOLUTION INTRAMUSCULAR; INTRAVENOUS at 13:15

## 2025-04-18 RX ADMIN — LIDOCAINE HYDROCHLORIDE 6 ML: 10 INJECTION, SOLUTION INFILTRATION; PERINEURAL at 13:44

## 2025-04-18 RX ADMIN — SODIUM CHLORIDE 12 MG: 9 INJECTION, SOLUTION INTRAMUSCULAR; INTRAVENOUS; SUBCUTANEOUS at 13:24

## 2025-04-18 RX ADMIN — MIDAZOLAM 0.5 MG: 1 INJECTION INTRAMUSCULAR; INTRAVENOUS at 13:42

## 2025-04-18 RX ADMIN — Medication 10 ML: at 12:25

## 2025-04-18 RX ADMIN — MIDAZOLAM 1 MG: 1 INJECTION INTRAMUSCULAR; INTRAVENOUS at 13:33

## 2025-04-18 RX ADMIN — FENTANYL CITRATE 50 MCG: 50 INJECTION, SOLUTION INTRAMUSCULAR; INTRAVENOUS at 13:36

## 2025-04-18 RX ADMIN — FENTANYL CITRATE 50 MCG: 50 INJECTION, SOLUTION INTRAMUSCULAR; INTRAVENOUS at 13:53

## 2025-04-18 RX ADMIN — CEFAZOLIN 2000 MG: 1 INJECTION, POWDER, FOR SOLUTION INTRAMUSCULAR; INTRAVENOUS at 12:51

## 2025-04-18 RX ADMIN — FENTANYL CITRATE 50 MCG: 50 INJECTION, SOLUTION INTRAMUSCULAR; INTRAVENOUS at 13:19

## 2025-04-18 RX ADMIN — Medication 500 UNITS: at 14:07

## 2025-04-18 NOTE — H&P
[facility]   Interventional Radiology H&P    Patient Name: Catarina Mclean  : 1955  MRN: [id number]  Primary Care Physician:  Bozena Alamo APRN  Referring Physician: Can Sethi MD  Date of admission: 2025    Subjective   Subjective     HPI:  Catarina Mclean is a 70 y.o. female with malfunctioning left chest port.    Review of Systems:   Constitutional no fever,  no weight loss       Otolaryngeal no difficulty swallowing   Cardiovascular no chest pain   Pulmonary no cough, no sputum production   Gastrointestinal no constipation, no diarrhea                         Personal History       Past Medical/Surgical History:   Past Medical History:   Diagnosis Date    Drug therapy     Hyperlipidemia     Hypertension     Non Hodgkin's lymphoma 2009     Past Surgical History:   Procedure Laterality Date    BREAST BIOPSY      HYSTERECTOMY         Social History:  reports that she has never smoked. She has never been exposed to tobacco smoke. She has never used smokeless tobacco. She reports that she does not currently use alcohol after a past usage of about 10.0 standard drinks of alcohol per week. She reports that she does not use drugs.    Medications:  (Not in a hospital admission)    Current medications:  sodium chloride, 10 mL, Intravenous, Q12H      Current IV drips:  sodium chloride, 75 mL/hr, Last Rate: 75 mL/hr (25 1225)        Allergies:  Allergies   Allergen Reactions    Sulfamethoxazole-Trimethoprim Swelling       Objective    Objective     Vitals:   Temp:  [97.9 °F (36.6 °C)] 97.9 °F (36.6 °C)  Heart Rate:  [73] 73  Resp:  [14] 14  BP: (137)/(72) 137/72  Flow (L/min) (Oxygen Therapy):  [2] 2      Physical Exam:   Constitutional: Awake, alert, No acute distress    Respiratory: Clear to auscultation bilaterally, nonlabored respirations    Cardiovascular: RRR, no murmurs, rubs, or gallops, palpable pedal pulses bilaterally   Gastrointestinal: Positive bowel sounds, soft,  "nontender, nondistended        ASA SCALE ASSESSMENT:  2-Mild to moderate systemic disease, medically well controlled, with no functional limitation    MALLAMPATI CLASSIFICATION:  2-Able to visualize the soft palate, fauces, uvula. The anterior & posterior tonsilar pillars are hidden by the tongue.       Result Review        Result Review:     No results found for: \"NA\"    No results found for: \"K\"    No results found for: \"CL\"    No results found for: \"PLASMABICARB\"    No results found for: \"BUN\"    No results found for: \"CREATININE\"    No results found for: \"CALCIUM\"        No components found for: \"GLUCOSE.*\"  Results from last 7 days   Lab Units 04/18/25  1209   WBC 10*3/mm3 20.22*   HEMOGLOBIN g/dL 7.9*   HEMATOCRIT % 26.1*   PLATELETS 10*3/mm3 170      Results from last 7 days   Lab Units 04/18/25  1209   INR  1.05           Assessment / Plan     Assesment:   Malfunctioning left chest port.      Plan:   Port catheter replacement.     The risks and benefits of the procedure were discussed with the patient.    Electronically signed by NITIN Ellis, 04/18/25, 1:05 PM EDT.  "

## 2025-04-18 NOTE — PROGRESS NOTES
Pt is here for her stimufend, she had CBC prior to her intrathecal methotrexate today and her port replacement. She and her son report that IR wanted to make sure her hgb today was 7.9.   She was 8.8 on 4/15/25.  She is scheduled to return to clinic on 4/23 for labs and IVF,  pt c/o just feeling so tired, reports has been increasingly weak,   denies s/s of infection, pt and son report she is staying with son for next few days.   Reviewed with Dr Sethi and pt to call office if increase or change in symptoms.   Reviewed with pt and son and v/u and agreement.

## 2025-04-21 DIAGNOSIS — Z51.11 ENCOUNTER FOR ANTINEOPLASTIC CHEMOTHERAPY: ICD-10-CM

## 2025-04-21 DIAGNOSIS — Z79.899 ENCOUNTER FOR MONITORING CARDIOTOXIC DRUG THERAPY: Primary | ICD-10-CM

## 2025-04-21 DIAGNOSIS — C85.80 LYMPHOMA MALIGNANT, LARGE CELL: ICD-10-CM

## 2025-04-21 DIAGNOSIS — Z51.81 ENCOUNTER FOR MONITORING CARDIOTOXIC DRUG THERAPY: Primary | ICD-10-CM

## 2025-04-23 ENCOUNTER — HOSPITAL ENCOUNTER (OUTPATIENT)
Dept: ONCOLOGY | Facility: HOSPITAL | Age: 70
Discharge: HOME | End: 2025-04-23
Payer: MEDICARE

## 2025-04-23 ENCOUNTER — HOSPITAL ENCOUNTER (OUTPATIENT)
Dept: INFUSION THERAPY | Facility: HOSPITAL | Age: 70
Discharge: HOME | End: 2025-04-23
Admitting: NURSE PRACTITIONER
Payer: MEDICARE

## 2025-04-23 ENCOUNTER — LAB (OUTPATIENT)
Dept: LAB | Facility: HOSPITAL | Age: 70
End: 2025-04-23
Payer: MEDICARE

## 2025-04-23 VITALS
SYSTOLIC BLOOD PRESSURE: 124 MMHG | HEART RATE: 82 BPM | OXYGEN SATURATION: 92 % | DIASTOLIC BLOOD PRESSURE: 80 MMHG | RESPIRATION RATE: 16 BRPM

## 2025-04-23 DIAGNOSIS — D64.9 ANEMIA, UNSPECIFIED TYPE: Primary | ICD-10-CM

## 2025-04-23 DIAGNOSIS — D64.9 SYMPTOMATIC ANEMIA: ICD-10-CM

## 2025-04-23 DIAGNOSIS — C83.31 DIFFUSE LARGE B-CELL LYMPHOMA, LYMPH NODES OF HEAD, FACE, AND NECK: Primary | ICD-10-CM

## 2025-04-23 DIAGNOSIS — R73.09 ELEVATED GLUCOSE LEVEL: ICD-10-CM

## 2025-04-23 DIAGNOSIS — C85.80 LYMPHOMA MALIGNANT, LARGE CELL: ICD-10-CM

## 2025-04-23 DIAGNOSIS — R53.1 WEAKNESS: ICD-10-CM

## 2025-04-23 DIAGNOSIS — D64.9 ANEMIA, UNSPECIFIED TYPE: ICD-10-CM

## 2025-04-23 DIAGNOSIS — Z45.2 ENCOUNTER FOR CARE RELATED TO VASCULAR ACCESS PORT: ICD-10-CM

## 2025-04-23 DIAGNOSIS — C83.31 DIFFUSE LARGE B-CELL LYMPHOMA, LYMPH NODES OF HEAD, FACE, AND NECK: ICD-10-CM

## 2025-04-23 DIAGNOSIS — E86.0 DEHYDRATION: Primary | ICD-10-CM

## 2025-04-23 DIAGNOSIS — Z45.2 ENCOUNTER FOR CARE RELATED TO VASCULAR ACCESS PORT: Primary | ICD-10-CM

## 2025-04-23 LAB
ABO GROUP BLD: NORMAL
ALBUMIN SERPL-MCNC: 3.9 G/DL (ref 3.5–5.2)
ALBUMIN/GLOB SERPL: 2.8 G/DL
ALP SERPL-CCNC: 166 U/L (ref 39–117)
ALT SERPL W P-5'-P-CCNC: 25 U/L (ref 1–33)
ANION GAP SERPL CALCULATED.3IONS-SCNC: 10.1 MMOL/L (ref 5–15)
AST SERPL-CCNC: 16 U/L (ref 1–32)
BASOPHILS # BLD AUTO: 0 10*3/MM3 (ref 0–0.2)
BASOPHILS NFR BLD AUTO: 0 % (ref 0–1.5)
BB HOLD TUBE: NORMAL
BILIRUB SERPL-MCNC: 1.8 MG/DL (ref 0–1.2)
BLD GP AB SCN SERPL QL: NEGATIVE
BUN SERPL-MCNC: 17 MG/DL (ref 8–23)
BUN/CREAT SERPL: 26.6 (ref 7–25)
CALCIUM SPEC-SCNC: 9.7 MG/DL (ref 8.6–10.5)
CHLORIDE SERPL-SCNC: 103 MMOL/L (ref 98–107)
CO2 SERPL-SCNC: 25.9 MMOL/L (ref 22–29)
CREAT SERPL-MCNC: 0.64 MG/DL (ref 0.57–1)
DACRYOCYTES BLD QL SMEAR: ABNORMAL
DEPRECATED RDW RBC AUTO: 77.6 FL (ref 37–54)
DEPRECATED RDW RBC AUTO: 82.9 FL (ref 37–54)
EGFRCR SERPLBLD CKD-EPI 2021: 95.2 ML/MIN/1.73
EOSINOPHIL # BLD AUTO: 0 10*3/MM3 (ref 0–0.4)
EOSINOPHIL NFR BLD AUTO: 0 % (ref 0.3–6.2)
ERYTHROCYTE [DISTWIDTH] IN BLOOD BY AUTOMATED COUNT: 18.7 % (ref 12.3–15.4)
ERYTHROCYTE [DISTWIDTH] IN BLOOD BY AUTOMATED COUNT: 19.1 % (ref 12.3–15.4)
GLOBULIN UR ELPH-MCNC: 1.4 GM/DL
GLUCOSE SERPL-MCNC: 202 MG/DL (ref 65–99)
HCT VFR BLD AUTO: 23.4 % (ref 34–46.6)
HCT VFR BLD AUTO: 25.4 % (ref 34–46.6)
HGB BLD-MCNC: 6.7 G/DL (ref 12–15.9)
HGB BLD-MCNC: 7.2 G/DL (ref 12–15.9)
HYPOCHROMIA BLD QL: ABNORMAL
INR PPP: 1.16 (ref 0.9–1.1)
LDH SERPL-CCNC: 415 U/L (ref 135–214)
LYMPHOCYTES # BLD AUTO: 1.72 10*3/MM3 (ref 0.7–3.1)
LYMPHOCYTES # BLD MANUAL: 1.12 10*3/MM3 (ref 0.7–3.1)
LYMPHOCYTES NFR BLD AUTO: 11.5 % (ref 19.6–45.3)
LYMPHOCYTES NFR BLD MANUAL: 2 % (ref 5–12)
MAGNESIUM SERPL-MCNC: 2.2 MG/DL (ref 1.6–2.4)
MCH RBC QN AUTO: 35.3 PG (ref 26.6–33)
MCH RBC QN AUTO: 35.8 PG (ref 26.6–33)
MCHC RBC AUTO-ENTMCNC: 28.3 G/DL (ref 31.5–35.7)
MCHC RBC AUTO-ENTMCNC: 28.6 G/DL (ref 31.5–35.7)
MCV RBC AUTO: 123.2 FL (ref 79–97)
MCV RBC AUTO: 126.4 FL (ref 79–97)
MONOCYTES # BLD AUTO: 0.52 10*3/MM3 (ref 0.1–0.9)
MONOCYTES # BLD: 0.28 10*3/MM3 (ref 0.1–0.9)
MONOCYTES NFR BLD AUTO: 3.5 % (ref 5–12)
NEUTROPHILS # BLD AUTO: 12.64 10*3/MM3 (ref 1.7–7)
NEUTROPHILS NFR BLD AUTO: 12.78 10*3/MM3 (ref 1.7–7)
NEUTROPHILS NFR BLD AUTO: 85 % (ref 42.7–76)
NEUTROPHILS NFR BLD MANUAL: 81 % (ref 42.7–76)
NEUTS BAND NFR BLD MANUAL: 9 % (ref 0–5)
OVALOCYTES BLD QL SMEAR: ABNORMAL
PHOSPHATE SERPL-MCNC: 4 MG/DL (ref 2.5–4.5)
PLATELET # BLD AUTO: 123 10*3/MM3 (ref 140–450)
PLATELET # BLD AUTO: 137 10*3/MM3 (ref 140–450)
PMV BLD AUTO: 10.1 FL (ref 6–12)
PMV BLD AUTO: 9.8 FL (ref 6–12)
POIKILOCYTOSIS BLD QL SMEAR: ABNORMAL
POLYCHROMASIA BLD QL SMEAR: ABNORMAL
POTASSIUM SERPL-SCNC: 4.5 MMOL/L (ref 3.5–5.2)
PROT SERPL-MCNC: 5.3 G/DL (ref 6–8.5)
PROTHROMBIN TIME: 14.7 SECONDS (ref 11.7–14.2)
RBC # BLD AUTO: 1.9 10*6/MM3 (ref 3.77–5.28)
RBC # BLD AUTO: 2.01 10*6/MM3 (ref 3.77–5.28)
RH BLD: POSITIVE
SCAN SLIDE: NORMAL
SCHISTOCYTES BLD QL SMEAR: ABNORMAL
SMALL PLATELETS BLD QL SMEAR: ABNORMAL
SODIUM SERPL-SCNC: 139 MMOL/L (ref 136–145)
T&S EXPIRATION DATE: NORMAL
VARIANT LYMPHS NFR BLD MANUAL: 8 % (ref 19.6–45.3)
WBC MORPH BLD: NORMAL
WBC NRBC COR # BLD AUTO: 14.04 10*3/MM3 (ref 3.4–10.8)
WBC NRBC COR # BLD AUTO: 15.02 10*3/MM3 (ref 3.4–10.8)

## 2025-04-23 PROCEDURE — 86900 BLOOD TYPING SEROLOGIC ABO: CPT | Performed by: NURSE PRACTITIONER

## 2025-04-23 PROCEDURE — 36591 DRAW BLOOD OFF VENOUS DEVICE: CPT

## 2025-04-23 PROCEDURE — 96523 IRRIG DRUG DELIVERY DEVICE: CPT

## 2025-04-23 PROCEDURE — 84100 ASSAY OF PHOSPHORUS: CPT | Performed by: STUDENT IN AN ORGANIZED HEALTH CARE EDUCATION/TRAINING PROGRAM

## 2025-04-23 PROCEDURE — 86923 COMPATIBILITY TEST ELECTRIC: CPT

## 2025-04-23 PROCEDURE — 83735 ASSAY OF MAGNESIUM: CPT | Performed by: STUDENT IN AN ORGANIZED HEALTH CARE EDUCATION/TRAINING PROGRAM

## 2025-04-23 PROCEDURE — 85025 COMPLETE CBC W/AUTO DIFF WBC: CPT | Performed by: STUDENT IN AN ORGANIZED HEALTH CARE EDUCATION/TRAINING PROGRAM

## 2025-04-23 PROCEDURE — 86901 BLOOD TYPING SEROLOGIC RH(D): CPT | Performed by: NURSE PRACTITIONER

## 2025-04-23 PROCEDURE — 85610 PROTHROMBIN TIME: CPT | Performed by: STUDENT IN AN ORGANIZED HEALTH CARE EDUCATION/TRAINING PROGRAM

## 2025-04-23 PROCEDURE — 85025 COMPLETE CBC W/AUTO DIFF WBC: CPT | Performed by: NURSE PRACTITIONER

## 2025-04-23 PROCEDURE — 86850 RBC ANTIBODY SCREEN: CPT | Performed by: NURSE PRACTITIONER

## 2025-04-23 PROCEDURE — 96360 HYDRATION IV INFUSION INIT: CPT

## 2025-04-23 PROCEDURE — 80053 COMPREHEN METABOLIC PANEL: CPT | Performed by: STUDENT IN AN ORGANIZED HEALTH CARE EDUCATION/TRAINING PROGRAM

## 2025-04-23 PROCEDURE — 83036 HEMOGLOBIN GLYCOSYLATED A1C: CPT | Performed by: NURSE PRACTITIONER

## 2025-04-23 PROCEDURE — 25010000002 HEPARIN LOCK FLUSH PER 10 UNITS: Performed by: STUDENT IN AN ORGANIZED HEALTH CARE EDUCATION/TRAINING PROGRAM

## 2025-04-23 PROCEDURE — 85007 BL SMEAR W/DIFF WBC COUNT: CPT | Performed by: NURSE PRACTITIONER

## 2025-04-23 PROCEDURE — 25810000003 SODIUM CHLORIDE 0.9 % SOLUTION: Performed by: STUDENT IN AN ORGANIZED HEALTH CARE EDUCATION/TRAINING PROGRAM

## 2025-04-23 PROCEDURE — 83615 LACTATE (LD) (LDH) ENZYME: CPT | Performed by: STUDENT IN AN ORGANIZED HEALTH CARE EDUCATION/TRAINING PROGRAM

## 2025-04-23 RX ORDER — SODIUM CHLORIDE 9 MG/ML
250 INJECTION, SOLUTION INTRAVENOUS ONCE
Status: CANCELLED | OUTPATIENT
Start: 2025-04-23

## 2025-04-23 RX ORDER — HEPARIN SODIUM (PORCINE) LOCK FLUSH IV SOLN 100 UNIT/ML 100 UNIT/ML
500 SOLUTION INTRAVENOUS AS NEEDED
Status: DISCONTINUED | OUTPATIENT
Start: 2025-04-23 | End: 2025-04-24 | Stop reason: HOSPADM

## 2025-04-23 RX ORDER — SODIUM CHLORIDE 0.9 % (FLUSH) 0.9 %
20 SYRINGE (ML) INJECTION AS NEEDED
Status: DISCONTINUED | OUTPATIENT
Start: 2025-04-23 | End: 2025-04-26 | Stop reason: HOSPADM

## 2025-04-23 RX ORDER — HEPARIN SODIUM (PORCINE) LOCK FLUSH IV SOLN 100 UNIT/ML 100 UNIT/ML
500 SOLUTION INTRAVENOUS AS NEEDED
Status: DISCONTINUED | OUTPATIENT
Start: 2025-04-23 | End: 2025-04-26 | Stop reason: HOSPADM

## 2025-04-23 RX ORDER — SODIUM CHLORIDE 0.9 % (FLUSH) 0.9 %
20 SYRINGE (ML) INJECTION AS NEEDED
Status: CANCELLED | OUTPATIENT
Start: 2025-04-23

## 2025-04-23 RX ORDER — HEPARIN SODIUM (PORCINE) LOCK FLUSH IV SOLN 100 UNIT/ML 100 UNIT/ML
500 SOLUTION INTRAVENOUS AS NEEDED
Status: CANCELLED | OUTPATIENT
Start: 2025-04-23

## 2025-04-23 RX ORDER — ALLOPURINOL 300 MG/1
300 TABLET ORAL DAILY
Qty: 30 TABLET | Refills: 0 | Status: SHIPPED | OUTPATIENT
Start: 2025-04-23

## 2025-04-23 RX ORDER — SODIUM CHLORIDE 0.9 % (FLUSH) 0.9 %
20 SYRINGE (ML) INJECTION AS NEEDED
Status: DISCONTINUED | OUTPATIENT
Start: 2025-04-23 | End: 2025-04-24 | Stop reason: HOSPADM

## 2025-04-23 RX ADMIN — Medication 20 ML: at 15:51

## 2025-04-23 RX ADMIN — Medication 20 ML: at 15:24

## 2025-04-23 RX ADMIN — SODIUM CHLORIDE 1000 ML: 9 INJECTION, SOLUTION INTRAVENOUS at 14:18

## 2025-04-23 RX ADMIN — Medication 500 UNITS: at 15:52

## 2025-04-23 NOTE — PROGRESS NOTES
Pt here for IVF's. [facility] infusaport accessed. 10cc wasted. Labs sent to lab. Pt complains of weakness and fatique. She states, I am too weak to open my car door. Hgb 7.2. Ann Marie Horner NP notified of results. Orders for blood transfusion.  Pt aware of orders. [facility]oke [facility]. Pt to come over for type and cross after infusion. Infusion completed per orders. Pt on the way to ambulatory care.

## 2025-04-24 ENCOUNTER — HOSPITAL ENCOUNTER (OUTPATIENT)
Dept: INFUSION THERAPY | Facility: HOSPITAL | Age: 70
Discharge: HOME | End: 2025-04-24
Payer: MEDICARE

## 2025-04-24 VITALS
RESPIRATION RATE: 16 BRPM | DIASTOLIC BLOOD PRESSURE: 72 MMHG | OXYGEN SATURATION: 92 % | SYSTOLIC BLOOD PRESSURE: 151 MMHG | TEMPERATURE: 98 F | HEART RATE: 67 BPM

## 2025-04-24 DIAGNOSIS — C83.31 DIFFUSE LARGE B-CELL LYMPHOMA, LYMPH NODES OF HEAD, FACE, AND NECK: ICD-10-CM

## 2025-04-24 DIAGNOSIS — R73.09 ELEVATED GLUCOSE LEVEL: Primary | ICD-10-CM

## 2025-04-24 DIAGNOSIS — D64.9 ANEMIA, UNSPECIFIED TYPE: ICD-10-CM

## 2025-04-24 DIAGNOSIS — R53.1 WEAKNESS: ICD-10-CM

## 2025-04-24 DIAGNOSIS — Z45.2 ENCOUNTER FOR CARE RELATED TO VASCULAR ACCESS PORT: Primary | ICD-10-CM

## 2025-04-24 LAB — HBA1C MFR BLD: <4.2 % (ref 4.8–5.6)

## 2025-04-24 PROCEDURE — 25010000002 HEPARIN LOCK FLUSH PER 10 UNITS: Performed by: STUDENT IN AN ORGANIZED HEALTH CARE EDUCATION/TRAINING PROGRAM

## 2025-04-24 PROCEDURE — 36430 TRANSFUSION BLD/BLD COMPNT: CPT

## 2025-04-24 PROCEDURE — 86900 BLOOD TYPING SEROLOGIC ABO: CPT

## 2025-04-24 PROCEDURE — P9016 RBC LEUKOCYTES REDUCED: HCPCS

## 2025-04-24 RX ORDER — HEPARIN SODIUM (PORCINE) LOCK FLUSH IV SOLN 100 UNIT/ML 100 UNIT/ML
500 SOLUTION INTRAVENOUS AS NEEDED
Status: DISCONTINUED | OUTPATIENT
Start: 2025-04-24 | End: 2025-04-26 | Stop reason: HOSPADM

## 2025-04-24 RX ORDER — SODIUM CHLORIDE 0.9 % (FLUSH) 0.9 %
20 SYRINGE (ML) INJECTION AS NEEDED
Status: CANCELLED | OUTPATIENT
Start: 2025-04-24

## 2025-04-24 RX ORDER — SODIUM CHLORIDE 9 MG/ML
250 INJECTION, SOLUTION INTRAVENOUS ONCE
Status: DISCONTINUED | OUTPATIENT
Start: 2025-04-24 | End: 2025-04-26 | Stop reason: HOSPADM

## 2025-04-24 RX ORDER — HEPARIN SODIUM (PORCINE) LOCK FLUSH IV SOLN 100 UNIT/ML 100 UNIT/ML
500 SOLUTION INTRAVENOUS AS NEEDED
Status: CANCELLED | OUTPATIENT
Start: 2025-04-24

## 2025-04-24 RX ORDER — SODIUM CHLORIDE 0.9 % (FLUSH) 0.9 %
20 SYRINGE (ML) INJECTION AS NEEDED
Status: DISCONTINUED | OUTPATIENT
Start: 2025-04-24 | End: 2025-04-26 | Stop reason: HOSPADM

## 2025-04-24 RX ADMIN — Medication 500 UNITS: at 09:57

## 2025-04-24 RX ADMIN — Medication 20 ML: at 09:56

## 2025-04-30 ENCOUNTER — LAB (OUTPATIENT)
Dept: LAB | Facility: HOSPITAL | Age: 70
End: 2025-04-30
Payer: MEDICARE

## 2025-04-30 ENCOUNTER — HOSPITAL ENCOUNTER (OUTPATIENT)
Dept: ONCOLOGY | Facility: HOSPITAL | Age: 70
Discharge: HOME | End: 2025-04-30
Payer: MEDICARE

## 2025-04-30 VITALS
BODY MASS INDEX: 29.07 KG/M2 | HEART RATE: 90 BPM | TEMPERATURE: 98 F | DIASTOLIC BLOOD PRESSURE: 84 MMHG | SYSTOLIC BLOOD PRESSURE: 161 MMHG | HEIGHT: 61 IN | OXYGEN SATURATION: 91 % | WEIGHT: 154 LBS | RESPIRATION RATE: 16 BRPM

## 2025-04-30 DIAGNOSIS — R53.1 WEAKNESS: ICD-10-CM

## 2025-04-30 DIAGNOSIS — Z45.2 ENCOUNTER FOR CARE RELATED TO VASCULAR ACCESS PORT: Primary | ICD-10-CM

## 2025-04-30 DIAGNOSIS — E87.6 HYPOKALEMIA: ICD-10-CM

## 2025-04-30 DIAGNOSIS — C83.31 DIFFUSE LARGE B-CELL LYMPHOMA, LYMPH NODES OF HEAD, FACE, AND NECK: ICD-10-CM

## 2025-04-30 DIAGNOSIS — C85.80 LYMPHOMA MALIGNANT, LARGE CELL: ICD-10-CM

## 2025-04-30 DIAGNOSIS — E86.0 DEHYDRATION: ICD-10-CM

## 2025-04-30 LAB
ALBUMIN SERPL-MCNC: 4 G/DL (ref 3.5–5.2)
ALBUMIN/GLOB SERPL: 3.1 G/DL
ALP SERPL-CCNC: 117 U/L (ref 39–117)
ALT SERPL W P-5'-P-CCNC: 46 U/L (ref 1–33)
ANION GAP SERPL CALCULATED.3IONS-SCNC: 9.8 MMOL/L (ref 5–15)
AST SERPL-CCNC: 35 U/L (ref 1–32)
BASOPHILS # BLD AUTO: 0.07 10*3/MM3 (ref 0–0.2)
BASOPHILS NFR BLD AUTO: 0.4 % (ref 0–1.5)
BILIRUB SERPL-MCNC: 1.8 MG/DL (ref 0–1.2)
BUN SERPL-MCNC: 15 MG/DL (ref 8–23)
BUN/CREAT SERPL: 21.4 (ref 7–25)
CALCIUM SPEC-SCNC: 9.6 MG/DL (ref 8.6–10.5)
CHLORIDE SERPL-SCNC: 103 MMOL/L (ref 98–107)
CO2 SERPL-SCNC: 25.2 MMOL/L (ref 22–29)
CREAT SERPL-MCNC: 0.7 MG/DL (ref 0.57–1)
DEPRECATED RDW RBC AUTO: 72.6 FL (ref 37–54)
EGFRCR SERPLBLD CKD-EPI 2021: 93.2 ML/MIN/1.73
EOSINOPHIL # BLD AUTO: 0 10*3/MM3 (ref 0–0.4)
EOSINOPHIL NFR BLD AUTO: 0 % (ref 0.3–6.2)
ERYTHROCYTE [DISTWIDTH] IN BLOOD BY AUTOMATED COUNT: 18 % (ref 12.3–15.4)
GLOBULIN UR ELPH-MCNC: 1.3 GM/DL
GLUCOSE SERPL-MCNC: 262 MG/DL (ref 65–99)
HCT VFR BLD AUTO: 37.4 % (ref 34–46.6)
HGB BLD-MCNC: 11.1 G/DL (ref 12–15.9)
LDH SERPL-CCNC: 490 U/L (ref 135–214)
LYMPHOCYTES # BLD AUTO: 2.36 10*3/MM3 (ref 0.7–3.1)
LYMPHOCYTES NFR BLD AUTO: 13.6 % (ref 19.6–45.3)
MAGNESIUM SERPL-MCNC: 2.2 MG/DL (ref 1.6–2.4)
MCH RBC QN AUTO: 34 PG (ref 26.6–33)
MCHC RBC AUTO-ENTMCNC: 29.7 G/DL (ref 31.5–35.7)
MCV RBC AUTO: 114.7 FL (ref 79–97)
MONOCYTES # BLD AUTO: 0.95 10*3/MM3 (ref 0.1–0.9)
MONOCYTES NFR BLD AUTO: 5.5 % (ref 5–12)
NEUTROPHILS NFR BLD AUTO: 14.03 10*3/MM3 (ref 1.7–7)
NEUTROPHILS NFR BLD AUTO: 80.5 % (ref 42.7–76)
PHOSPHATE SERPL-MCNC: 2.7 MG/DL (ref 2.5–4.5)
PLATELET # BLD AUTO: 196 10*3/MM3 (ref 140–450)
PMV BLD AUTO: 9.5 FL (ref 6–12)
POTASSIUM SERPL-SCNC: 4.3 MMOL/L (ref 3.5–5.2)
PROT SERPL-MCNC: 5.3 G/DL (ref 6–8.5)
RBC # BLD AUTO: 3.26 10*6/MM3 (ref 3.77–5.28)
SODIUM SERPL-SCNC: 138 MMOL/L (ref 136–145)
WBC NRBC COR # BLD AUTO: 17.41 10*3/MM3 (ref 3.4–10.8)

## 2025-04-30 PROCEDURE — 85025 COMPLETE CBC W/AUTO DIFF WBC: CPT | Performed by: STUDENT IN AN ORGANIZED HEALTH CARE EDUCATION/TRAINING PROGRAM

## 2025-04-30 PROCEDURE — 25010000002 HEPARIN LOCK FLUSH PER 10 UNITS: Performed by: STUDENT IN AN ORGANIZED HEALTH CARE EDUCATION/TRAINING PROGRAM

## 2025-04-30 PROCEDURE — 80053 COMPREHEN METABOLIC PANEL: CPT | Performed by: STUDENT IN AN ORGANIZED HEALTH CARE EDUCATION/TRAINING PROGRAM

## 2025-04-30 PROCEDURE — 25810000003 SODIUM CHLORIDE 0.9 % SOLUTION: Performed by: STUDENT IN AN ORGANIZED HEALTH CARE EDUCATION/TRAINING PROGRAM

## 2025-04-30 PROCEDURE — 83615 LACTATE (LD) (LDH) ENZYME: CPT | Performed by: STUDENT IN AN ORGANIZED HEALTH CARE EDUCATION/TRAINING PROGRAM

## 2025-04-30 PROCEDURE — 83735 ASSAY OF MAGNESIUM: CPT | Performed by: STUDENT IN AN ORGANIZED HEALTH CARE EDUCATION/TRAINING PROGRAM

## 2025-04-30 PROCEDURE — 84100 ASSAY OF PHOSPHORUS: CPT | Performed by: STUDENT IN AN ORGANIZED HEALTH CARE EDUCATION/TRAINING PROGRAM

## 2025-04-30 PROCEDURE — 96360 HYDRATION IV INFUSION INIT: CPT

## 2025-04-30 RX ORDER — HEPARIN SODIUM (PORCINE) LOCK FLUSH IV SOLN 100 UNIT/ML 100 UNIT/ML
500 SOLUTION INTRAVENOUS AS NEEDED
Status: CANCELLED | OUTPATIENT
Start: 2025-04-30

## 2025-04-30 RX ORDER — SODIUM CHLORIDE 0.9 % (FLUSH) 0.9 %
20 SYRINGE (ML) INJECTION AS NEEDED
Status: DISCONTINUED | OUTPATIENT
Start: 2025-04-30 | End: 2025-05-01 | Stop reason: HOSPADM

## 2025-04-30 RX ORDER — HEPARIN SODIUM (PORCINE) LOCK FLUSH IV SOLN 100 UNIT/ML 100 UNIT/ML
500 SOLUTION INTRAVENOUS AS NEEDED
Status: DISCONTINUED | OUTPATIENT
Start: 2025-04-30 | End: 2025-05-01 | Stop reason: HOSPADM

## 2025-04-30 RX ORDER — POTASSIUM CHLORIDE 750 MG/1
10 TABLET, EXTENDED RELEASE ORAL DAILY
Qty: 30 TABLET | Refills: 0 | Status: SHIPPED | OUTPATIENT
Start: 2025-04-30 | End: 2025-05-30

## 2025-04-30 RX ORDER — SODIUM CHLORIDE 0.9 % (FLUSH) 0.9 %
20 SYRINGE (ML) INJECTION AS NEEDED
Status: CANCELLED | OUTPATIENT
Start: 2025-04-30

## 2025-04-30 RX ADMIN — Medication 20 ML: at 15:31

## 2025-04-30 RX ADMIN — SODIUM CHLORIDE 1000 ML: 900 INJECTION, SOLUTION INTRAVENOUS at 14:30

## 2025-04-30 RX ADMIN — HEPARIN SODIUM (PORCINE) LOCK FLUSH IV SOLN 100 UNIT/ML 500 UNITS: 100 SOLUTION at 15:31

## 2025-04-30 NOTE — PROGRESS NOTES
Patient to infusion for her weekly labs and 1 liter NS. Port accessed and flushed with good blood return noted. 10cc of blood wasted prior to specimen collection. Blood specimen obtained and sent to lab for processing per protocol.       VSS, her only complaints are overall fatigue and weakness. CBC showed Hgb 11.1 after her blood transfusion on 4/25/25.     Printed AVS and she was discharged home

## 2025-05-01 ENCOUNTER — HOSPITAL ENCOUNTER (OUTPATIENT)
Facility: HOSPITAL | Age: 70
Setting detail: OBSERVATION
Discharge: HOME | End: 2025-05-02
Attending: EMERGENCY MEDICINE | Admitting: INTERNAL MEDICINE
Payer: MEDICARE

## 2025-05-01 ENCOUNTER — APPOINTMENT (OUTPATIENT)
Dept: ULTRASOUND IMAGING | Facility: HOSPITAL | Age: 70
End: 2025-05-01
Payer: MEDICARE

## 2025-05-01 DIAGNOSIS — K62.5 RECTAL BLEEDING: Primary | ICD-10-CM

## 2025-05-01 DIAGNOSIS — K62.5 BRIGHT RED BLOOD PER RECTUM: ICD-10-CM

## 2025-05-01 PROBLEM — K92.2 GI BLEED: Status: ACTIVE | Noted: 2025-05-01

## 2025-05-01 PROBLEM — I10 ESSENTIAL HYPERTENSION: Status: ACTIVE | Noted: 2025-05-01

## 2025-05-01 PROBLEM — D72.829 LEUKOCYTOSIS: Status: ACTIVE | Noted: 2025-05-01

## 2025-05-01 LAB
ABO GROUP BLD: NORMAL
ADV 40+41 DNA STL QL NAA+NON-PROBE: NOT DETECTED
ALBUMIN SERPL-MCNC: 4 G/DL (ref 3.5–5.2)
ALBUMIN/GLOB SERPL: 2.7 G/DL
ALP SERPL-CCNC: 116 U/L (ref 39–117)
ALPHA1 GLOB MFR UR ELPH: 137 MG/DL (ref 90–200)
ALT SERPL W P-5'-P-CCNC: 47 U/L (ref 1–33)
ANION GAP SERPL CALCULATED.3IONS-SCNC: 13.2 MMOL/L (ref 5–15)
ANISOCYTOSIS BLD QL: ABNORMAL
APTT PPP: 23.6 SECONDS (ref 22.7–35.4)
AST SERPL-CCNC: 45 U/L (ref 1–32)
ASTRO TYP 1-8 RNA STL QL NAA+NON-PROBE: NOT DETECTED
BILIRUB SERPL-MCNC: 1.7 MG/DL (ref 0–1.2)
BLD GP AB SCN SERPL QL: NEGATIVE
BUN SERPL-MCNC: 14 MG/DL (ref 8–23)
BUN/CREAT SERPL: 20 (ref 7–25)
C CAYETANENSIS DNA STL QL NAA+NON-PROBE: NOT DETECTED
C COLI+JEJ+UPSA DNA STL QL NAA+NON-PROBE: NOT DETECTED
CALCIUM SPEC-SCNC: 9.2 MG/DL (ref 8.6–10.5)
CERULOPLASMIN SERPL-MCNC: 20 MG/DL (ref 19–39)
CHLORIDE SERPL-SCNC: 106 MMOL/L (ref 98–107)
CO2 SERPL-SCNC: 20.8 MMOL/L (ref 22–29)
CREAT SERPL-MCNC: 0.7 MG/DL (ref 0.57–1)
CRYPTOSP DNA STL QL NAA+NON-PROBE: NOT DETECTED
DEPRECATED RDW RBC AUTO: 72.4 FL (ref 37–54)
E HISTOLYT DNA STL QL NAA+NON-PROBE: NOT DETECTED
EAEC PAA PLAS AGGR+AATA ST NAA+NON-PRB: NOT DETECTED
EC STX1+STX2 GENES STL QL NAA+NON-PROBE: NOT DETECTED
EGFRCR SERPLBLD CKD-EPI 2021: 93.2 ML/MIN/1.73
EPEC EAE GENE STL QL NAA+NON-PROBE: NOT DETECTED
ERYTHROCYTE [DISTWIDTH] IN BLOOD BY AUTOMATED COUNT: 17.7 % (ref 12.3–15.4)
ETEC LTA+ST1A+ST1B TOX ST NAA+NON-PROBE: NOT DETECTED
FERRITIN SERPL-MCNC: 1241 NG/ML (ref 13–150)
G LAMBLIA DNA STL QL NAA+NON-PROBE: NOT DETECTED
GLOBULIN UR ELPH-MCNC: 1.5 GM/DL
GLUCOSE SERPL-MCNC: 176 MG/DL (ref 65–99)
HAV IGM SERPL QL IA: NORMAL
HBV CORE IGM SERPL QL IA: NORMAL
HBV SURFACE AG SERPL QL IA: NORMAL
HCT VFR BLD AUTO: 36.9 % (ref 34–46.6)
HCV AB SER QL: NORMAL
HGB BLD-MCNC: 10.9 G/DL (ref 12–15.9)
HYPOCHROMIA BLD QL: ABNORMAL
INR PPP: 1.02 (ref 0.9–1.1)
IRON 24H UR-MRATE: 106 MCG/DL (ref 37–145)
IRON SATN MFR SERPL: 33 % (ref 20–50)
LYMPHOCYTES # BLD MANUAL: 2.68 10*3/MM3 (ref 0.7–3.1)
LYMPHOCYTES NFR BLD MANUAL: 1 % (ref 5–12)
MCH RBC QN AUTO: 32.8 PG (ref 26.6–33)
MCHC RBC AUTO-ENTMCNC: 29.5 G/DL (ref 31.5–35.7)
MCV RBC AUTO: 111.1 FL (ref 79–97)
METAMYELOCYTES NFR BLD MANUAL: 2 % (ref 0–0)
MONOCYTES # BLD: 0.22 10*3/MM3 (ref 0.1–0.9)
MYELOCYTES NFR BLD MANUAL: 5 % (ref 0–0)
NEUTROPHILS # BLD AUTO: 17.86 10*3/MM3 (ref 1.7–7)
NEUTROPHILS NFR BLD MANUAL: 55 % (ref 42.7–76)
NEUTS BAND NFR BLD MANUAL: 25 % (ref 0–5)
NEUTS VAC BLD QL SMEAR: ABNORMAL
NOROVIRUS GI+II RNA STL QL NAA+NON-PROBE: NOT DETECTED
NRBC SPEC MANUAL: 3 /100 WBC (ref 0–0.2)
OVALOCYTES BLD QL SMEAR: ABNORMAL
P SHIGELLOIDES DNA STL QL NAA+NON-PROBE: NOT DETECTED
PLAT MORPH BLD: NORMAL
PLATELET # BLD AUTO: 223 10*3/MM3 (ref 140–450)
PMV BLD AUTO: 9.9 FL (ref 6–12)
POIKILOCYTOSIS BLD QL SMEAR: ABNORMAL
POLYCHROMASIA BLD QL SMEAR: ABNORMAL
POTASSIUM SERPL-SCNC: 4.4 MMOL/L (ref 3.5–5.2)
PROT SERPL-MCNC: 5.5 G/DL (ref 6–8.5)
PROTHROMBIN TIME: 13.3 SECONDS (ref 11.7–14.2)
RBC # BLD AUTO: 3.32 10*6/MM3 (ref 3.77–5.28)
RH BLD: POSITIVE
RVA RNA STL QL NAA+NON-PROBE: NOT DETECTED
S ENT+BONG DNA STL QL NAA+NON-PROBE: NOT DETECTED
SAPO I+II+IV+V RNA STL QL NAA+NON-PROBE: NOT DETECTED
SCAN SLIDE: NORMAL
SHIGELLA SP+EIEC IPAH ST NAA+NON-PROBE: NOT DETECTED
SODIUM SERPL-SCNC: 140 MMOL/L (ref 136–145)
T&S EXPIRATION DATE: NORMAL
TIBC SERPL-MCNC: 317 MCG/DL (ref 298–536)
TOXIC GRANULATION: ABNORMAL
TRANSFERRIN SERPL-MCNC: 213 MG/DL (ref 200–360)
V CHOL+PARA+VUL DNA STL QL NAA+NON-PROBE: NOT DETECTED
V CHOLERAE DNA STL QL NAA+NON-PROBE: NOT DETECTED
VARIANT LYMPHS NFR BLD MANUAL: 1 % (ref 0–5)
VARIANT LYMPHS NFR BLD MANUAL: 11 % (ref 19.6–45.3)
WBC NRBC COR # BLD AUTO: 22.32 10*3/MM3 (ref 3.4–10.8)
Y ENTEROCOL DNA STL QL NAA+NON-PROBE: NOT DETECTED

## 2025-05-01 PROCEDURE — 85025 COMPLETE CBC W/AUTO DIFF WBC: CPT | Performed by: EMERGENCY MEDICINE

## 2025-05-01 PROCEDURE — 86901 BLOOD TYPING SEROLOGIC RH(D): CPT | Performed by: EMERGENCY MEDICINE

## 2025-05-01 PROCEDURE — 82728 ASSAY OF FERRITIN: CPT

## 2025-05-01 PROCEDURE — 25810000003 SODIUM CHLORIDE 0.9 % SOLUTION: Performed by: INTERNAL MEDICINE

## 2025-05-01 PROCEDURE — 99285 EMERGENCY DEPT VISIT HI MDM: CPT

## 2025-05-01 PROCEDURE — 83540 ASSAY OF IRON: CPT

## 2025-05-01 PROCEDURE — 85610 PROTHROMBIN TIME: CPT | Performed by: EMERGENCY MEDICINE

## 2025-05-01 PROCEDURE — G0378 HOSPITAL OBSERVATION PER HR: HCPCS

## 2025-05-01 PROCEDURE — 85730 THROMBOPLASTIN TIME PARTIAL: CPT | Performed by: EMERGENCY MEDICINE

## 2025-05-01 PROCEDURE — 87507 IADNA-DNA/RNA PROBE TQ 12-25: CPT | Performed by: INTERNAL MEDICINE

## 2025-05-01 PROCEDURE — 82390 ASSAY OF CERULOPLASMIN: CPT

## 2025-05-01 PROCEDURE — 85007 BL SMEAR W/DIFF WBC COUNT: CPT | Performed by: EMERGENCY MEDICINE

## 2025-05-01 PROCEDURE — 86850 RBC ANTIBODY SCREEN: CPT | Performed by: EMERGENCY MEDICINE

## 2025-05-01 PROCEDURE — 84466 ASSAY OF TRANSFERRIN: CPT

## 2025-05-01 PROCEDURE — 99222 1ST HOSP IP/OBS MODERATE 55: CPT | Performed by: STUDENT IN AN ORGANIZED HEALTH CARE EDUCATION/TRAINING PROGRAM

## 2025-05-01 PROCEDURE — 86900 BLOOD TYPING SEROLOGIC ABO: CPT | Performed by: EMERGENCY MEDICINE

## 2025-05-01 PROCEDURE — 80074 ACUTE HEPATITIS PANEL: CPT

## 2025-05-01 PROCEDURE — 76705 ECHO EXAM OF ABDOMEN: CPT

## 2025-05-01 PROCEDURE — 82103 ALPHA-1-ANTITRYPSIN TOTAL: CPT

## 2025-05-01 PROCEDURE — 80053 COMPREHEN METABOLIC PANEL: CPT | Performed by: EMERGENCY MEDICINE

## 2025-05-01 RX ORDER — SODIUM CHLORIDE 9 MG/ML
100 INJECTION, SOLUTION INTRAVENOUS CONTINUOUS
Status: DISCONTINUED | OUTPATIENT
Start: 2025-05-01 | End: 2025-05-02

## 2025-05-01 RX ORDER — SODIUM CHLORIDE 0.9 % (FLUSH) 0.9 %
10 SYRINGE (ML) INJECTION AS NEEDED
Status: DISCONTINUED | OUTPATIENT
Start: 2025-05-01 | End: 2025-05-02 | Stop reason: HOSPADM

## 2025-05-01 RX ORDER — CARVEDILOL 6.25 MG/1
6.25 TABLET ORAL 2 TIMES DAILY
Status: DISCONTINUED | OUTPATIENT
Start: 2025-05-01 | End: 2025-05-02 | Stop reason: HOSPADM

## 2025-05-01 RX ORDER — SODIUM, POTASSIUM,MAG SULFATES 17.5-3.13G
1 SOLUTION, RECONSTITUTED, ORAL ORAL EVERY 12 HOURS
Status: COMPLETED | OUTPATIENT
Start: 2025-05-01 | End: 2025-05-02

## 2025-05-01 RX ORDER — TRAZODONE HYDROCHLORIDE 50 MG/1
75 TABLET ORAL NIGHTLY
Status: DISCONTINUED | OUTPATIENT
Start: 2025-05-01 | End: 2025-05-02 | Stop reason: HOSPADM

## 2025-05-01 RX ORDER — AMLODIPINE BESYLATE 5 MG/1
5 TABLET ORAL DAILY
Status: DISCONTINUED | OUTPATIENT
Start: 2025-05-01 | End: 2025-05-02 | Stop reason: HOSPADM

## 2025-05-01 RX ORDER — ACYCLOVIR 200 MG/1
400 CAPSULE ORAL 2 TIMES DAILY
Status: DISCONTINUED | OUTPATIENT
Start: 2025-05-01 | End: 2025-05-02 | Stop reason: HOSPADM

## 2025-05-01 RX ORDER — ALLOPURINOL 300 MG/1
300 TABLET ORAL DAILY
Status: DISCONTINUED | OUTPATIENT
Start: 2025-05-01 | End: 2025-05-02 | Stop reason: HOSPADM

## 2025-05-01 RX ORDER — LISINOPRIL 20 MG/1
20 TABLET ORAL DAILY
Status: DISCONTINUED | OUTPATIENT
Start: 2025-05-01 | End: 2025-05-02 | Stop reason: HOSPADM

## 2025-05-01 RX ORDER — DAPSONE 100 MG/1
100 TABLET ORAL DAILY
Status: DISCONTINUED | OUTPATIENT
Start: 2025-05-01 | End: 2025-05-02 | Stop reason: HOSPADM

## 2025-05-01 RX ADMIN — SODIUM CHLORIDE 100 ML/HR: 9 INJECTION, SOLUTION INTRAVENOUS at 12:49

## 2025-05-01 RX ADMIN — ALLOPURINOL 300 MG: 300 TABLET ORAL at 11:50

## 2025-05-01 RX ADMIN — CARVEDILOL 6.25 MG: 6.25 TABLET, FILM COATED ORAL at 11:50

## 2025-05-01 RX ADMIN — ACYCLOVIR 400 MG: 200 CAPSULE ORAL at 20:34

## 2025-05-01 RX ADMIN — Medication 1 BOTTLE: at 15:05

## 2025-05-01 RX ADMIN — CARVEDILOL 6.25 MG: 6.25 TABLET, FILM COATED ORAL at 20:34

## 2025-05-01 RX ADMIN — ACYCLOVIR 400 MG: 200 CAPSULE ORAL at 12:53

## 2025-05-01 RX ADMIN — LISINOPRIL 20 MG: 20 TABLET ORAL at 11:50

## 2025-05-01 RX ADMIN — DAPSONE 100 MG: 100 TABLET ORAL at 12:53

## 2025-05-01 RX ADMIN — AMLODIPINE BESYLATE 5 MG: 5 TABLET ORAL at 11:50

## 2025-05-01 RX ADMIN — Medication 10 ML: at 07:00
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
## 2025-05-01 NOTE — ED PROVIDER NOTES
"Subjective   History of Present Illness  Chief complaint: Rectal bleeding    70-year-old female presents with rectal bleeding.  Patient has a history of lymphoma and is currently undergoing chemo.  She states around 3:30 AM this morning she started passing bright red blood per rectum with some dark clots.  She has never had rectal bleeding before.  She denies any abdominal pain.  She states she has had some mild constipation and has had to do some straining.  She denies any rectal pain.  She does not take any blood thinners.    History provided by:  Patient      Review of Systems   Constitutional:  Negative for fever.   HENT:  Negative for congestion.    Respiratory:  Negative for cough and shortness of breath.    Cardiovascular:  Negative for chest pain.   Gastrointestinal:  Positive for blood in stool. Negative for abdominal pain and vomiting.   Musculoskeletal:  Negative for back pain.   Neurological:  Negative for headaches.   Psychiatric/Behavioral:  Negative for confusion.        Past Medical History:   Diagnosis Date    Drug therapy     Hyperlipidemia     Hypertension     Non Hodgkin's lymphoma 2009       Allergies   Allergen Reactions    Sulfamethoxazole-Trimethoprim Swelling       Past Surgical History:   Procedure Laterality Date    BREAST BIOPSY      HYSTERECTOMY         Family History   Problem Relation Age of Onset    Liver cancer Brother     Breast cancer Maternal Aunt        Social History     Socioeconomic History    Marital status:    Tobacco Use    Smoking status: Never     Passive exposure: Never    Smokeless tobacco: Never   Vaping Use    Vaping status: Never Used   Substance and Sexual Activity    Alcohol use: Not Currently     Alcohol/week: 10.0 standard drinks of alcohol     Types: 10 Cans of beer per week     Comment: WEEKLY    Drug use: Never    Sexual activity: Defer       /93   Pulse 69   Temp 98.7 °F (37.1 °C) (Oral)   Resp 19   Ht 154.9 cm (61\")   Wt 70 kg (154 lb 5.2 " oz)   SpO2 93%   BMI 29.16 kg/m²       Objective   Physical Exam  Vitals and nursing note reviewed.   Constitutional:       Appearance: Normal appearance.   HENT:      Head: Normocephalic and atraumatic.      Mouth/Throat:      Mouth: Mucous membranes are moist.   Cardiovascular:      Rate and Rhythm: Normal rate and regular rhythm.      Heart sounds: Normal heart sounds.   Pulmonary:      Effort: Pulmonary effort is normal. No respiratory distress.      Breath sounds: Normal breath sounds.   Abdominal:      General: Bowel sounds are normal.      Palpations: Abdomen is soft.      Tenderness: There is no abdominal tenderness.   Genitourinary:     Comments: Very minor hemorrhoids with no obvious bleeding however there is dark blood in the rectal vault on digital rectal exam.  Skin:     General: Skin is warm and dry.   Neurological:      Mental Status: She is alert and oriented to person, place, and time.         Procedures           ED Course      Results for orders placed or performed during the hospital encounter of 05/01/25   Comprehensive Metabolic Panel    Collection Time: 05/01/25  6:03 AM    Specimen: Blood   Result Value Ref Range    Glucose 176 (H) 65 - 99 mg/dL    BUN 14 8 - 23 mg/dL    Creatinine 0.70 0.57 - 1.00 mg/dL    Sodium 140 136 - 145 mmol/L    Potassium 4.4 3.5 - 5.2 mmol/L    Chloride 106 98 - 107 mmol/L    CO2 20.8 (L) 22.0 - 29.0 mmol/L    Calcium 9.2 8.6 - 10.5 mg/dL    Total Protein 5.5 (L) 6.0 - 8.5 g/dL    Albumin 4.0 3.5 - 5.2 g/dL    ALT (SGPT) 47 (H) 1 - 33 U/L    AST (SGOT) 45 (H) 1 - 32 U/L    Alkaline Phosphatase 116 39 - 117 U/L    Total Bilirubin 1.7 (H) 0.0 - 1.2 mg/dL    Globulin 1.5 gm/dL    A/G Ratio 2.7 g/dL    BUN/Creatinine Ratio 20.0 7.0 - 25.0    Anion Gap 13.2 5.0 - 15.0 mmol/L    eGFR 93.2 >60.0 mL/min/1.73   Protime-INR    Collection Time: 05/01/25  6:03 AM    Specimen: Blood   Result Value Ref Range    Protime 13.3 11.7 - 14.2 Seconds    INR 1.02 0.90 - 1.10   aPTT     Collection Time: 05/01/25  6:03 AM    Specimen: Blood   Result Value Ref Range    PTT 23.6 22.7 - 35.4 seconds   CBC Auto Differential    Collection Time: 05/01/25  6:03 AM    Specimen: Blood   Result Value Ref Range    WBC 22.32 (H) 3.40 - 10.80 10*3/mm3    RBC 3.32 (L) 3.77 - 5.28 10*6/mm3    Hemoglobin 10.9 (L) 12.0 - 15.9 g/dL    Hematocrit 36.9 34.0 - 46.6 %    .1 (H) 79.0 - 97.0 fL    MCH 32.8 26.6 - 33.0 pg    MCHC 29.5 (L) 31.5 - 35.7 g/dL    RDW 17.7 (H) 12.3 - 15.4 %    RDW-SD 72.4 (H) 37.0 - 54.0 fl    MPV 9.9 6.0 - 12.0 fL    Platelets 223 140 - 450 10*3/mm3   Scan Slide    Collection Time: 05/01/25  6:03 AM    Specimen: Blood   Result Value Ref Range    Scan Slide     Manual Differential    Collection Time: 05/01/25  6:03 AM    Specimen: Blood   Result Value Ref Range    Neutrophil % 55.0 42.7 - 76.0 %    Lymphocyte % 11.0 (L) 19.6 - 45.3 %    Monocyte % 1.0 (L) 5.0 - 12.0 %    Bands %  25.0 (H) 0.0 - 5.0 %    Metamyelocyte % 2.0 (H) 0.0 - 0.0 %    Myelocyte % 5.0 (H) 0.0 - 0.0 %    Atypical Lymphocyte % 1.0 0.0 - 5.0 %    Neutrophils Absolute 17.86 (H) 1.70 - 7.00 10*3/mm3    Lymphocytes Absolute 2.68 0.70 - 3.10 10*3/mm3    Monocytes Absolute 0.22 0.10 - 0.90 10*3/mm3    nRBC 3.0 (H) 0.0 - 0.2 /100 WBC    Anisocytosis Slight/1+ None Seen    Hypochromia Slight/1+ None Seen    Ovalocytes Slight/1+ None Seen    Poikilocytes Slight/1+ None Seen    Polychromasia Slight/1+ None Seen    Toxic Granulation Slight/1+ None Seen    Vacuolated Neutrophils Slight/1+ None Seen    Platelet Morphology Normal Normal   Type & Screen    Collection Time: 05/01/25  6:03 AM    Specimen: Blood   Result Value Ref Range    ABO Type A     RH type Positive     Antibody Screen Negative     T&S Expiration Date 5/4/2025 11:59:59 PM                                                       Medical Decision Making  Amount and/or Complexity of Data Reviewed  Labs: ordered.    Risk  Prescription drug management.      Patient had the  above valuation.  Results were discussed with the patient.  Hemoglobin was okay at 10.9.  White blood cell count is 22.32.  It has been trending up over the past couple weeks.  Platelet count is normal.  CMP is unremarkable.  Coags are normal.  Patient has remained hemodynamically stable in the emergency room.  She has had some further bright red blood per rectum while in the emergency room.  I discussed with the provider on-call for the primary doctor and the patient will be admitted for further evaluation and management.      Final diagnoses:   Rectal bleeding       ED Disposition  ED Disposition       ED Disposition   Decision to Admit    Condition   --    Comment   Level of Care: Med/Surg [1]   Admitting Physician: NAVDEEP JIMENEZ [[id number]]   Attending Physician: NAVDEEP JIMENEZ [[id number]]                 No follow-up provider specified.       Medication List      No changes were made to your prescriptions during this visit.            Willie Kirkpatrick MD  05/01/25 0705

## 2025-05-01 NOTE — H&P
Patient Care Team:  Bozena Alamo APRN as PCP - General (Nurse Practitioner)  Can Sethi MD as Consulting Physician (Hematology and Oncology)  Israel García MD as Cardiologist (Cardiology)    Chief complaint blood per rectum    Subjective     Patient is a 70 y.o. female undergoing chemotherapy for non-Hodgkin's lymphoma who presents from home with sudden onset of bright red blood per rectum.  She woke early in the morning and passing clots.  She states there is no diarrhea, just passing pure blood.  She has no pain.  She felt well when she went to bed yesterday.  There is been no fever, chills, nausea, vomiting or other systemic symptoms.  She has had no change in diet or medications recently.  She is actively undergoing chemotherapy under Dr. Sethi.     Review of Systems   Constitutional:  Positive for activity change. Negative for appetite change, chills, diaphoresis and fatigue.   HENT:  Negative for congestion and facial swelling.    Eyes:  Negative for visual disturbance.   Respiratory:  Negative for cough, shortness of breath, wheezing and stridor.    Cardiovascular:  Negative for chest pain, palpitations and leg swelling.   Gastrointestinal:  Positive for blood in stool. Negative for abdominal pain, diarrhea, nausea, rectal pain and vomiting.   Genitourinary:  Negative for dysuria.   Musculoskeletal:  Negative for arthralgias, back pain and gait problem.   Neurological:  Negative for seizures.   Psychiatric/Behavioral:  Negative for confusion.           History  Past Medical History:   Diagnosis Date    Drug therapy     Hyperlipidemia     Hypertension     Non Hodgkin's lymphoma 2009     Past Surgical History:   Procedure Laterality Date    BREAST BIOPSY      HYSTERECTOMY       Family History   Problem Relation Age of Onset    Liver cancer Brother     Breast cancer Maternal Aunt      Social History     Tobacco Use    Smoking status: Never     Passive exposure: Never    Smokeless tobacco:  Never   Vaping Use    Vaping status: Never Used   Substance Use Topics    Alcohol use: Not Currently    Drug use: Never     Medications Prior to Admission   Medication Sig Dispense Refill Last Dose/Taking    acyclovir (ZOVIRAX) 400 MG tablet Take 1 tablet by mouth 2 (Two) Times a Day. Take no more than 5 doses a day. 60 tablet 5 4/30/2025    allopurinol (ZYLOPRIM) 300 MG tablet TAKE 1 TABLET BY MOUTH DAILY 30 tablet 0 4/30/2025    amLODIPine (NORVASC) 5 MG tablet Take 1 tablet by mouth Daily.   4/30/2025    aspirin 81 MG chewable tablet Chew 1 tablet Daily.   4/30/2025    carvedilol (COREG) 6.25 MG tablet Take 1 tablet by mouth 2 (Two) Times a Day. 180 tablet 3 4/30/2025    dapsone 100 MG tablet Take 1 tablet by mouth Daily. 30 tablet 3 4/30/2025    empagliflozin (Jardiance) 10 MG tablet tablet Take 1 tablet by mouth Daily.   4/30/2025    lisinopril (PRINIVIL,ZESTRIL) 20 MG tablet Take 1 tablet by mouth Daily.   4/30/2025    Omega-3 Fatty Acids (fish oil) 1000 MG capsule capsule Take 2 capsules by mouth Daily With Breakfast.   4/30/2025    potassium chloride 10 MEQ CR tablet Take 1 tablet by mouth Daily for 30 days. 30 tablet 0 4/30/2025    pravastatin (PRAVACHOL) 20 MG tablet Take 1 tablet by mouth Daily.   4/30/2025    predniSONE (DELTASONE) 50 MG tablet TAKE 2 TABLETS BY MOUTH DAILY. TAKE WITH FOOD. BRING THE FIRST DOSE TO CHEMOTHERAPY APPOINTMENT. 10 tablet 5 4/30/2025    traZODone (DESYREL) 50 MG tablet Take 1.5 tablets by mouth Every Night.   4/30/2025    lidocaine-prilocaine (EMLA) 2.5-2.5 % cream Apply 1 Application topically to the appropriate area as directed As Needed (apply 1 hour prior to port access). 30 g 2     ondansetron (ZOFRAN) 8 MG tablet Take 1 tablet by mouth 3 (Three) Times a Day As Needed for Nausea or Vomiting. 30 tablet 5      Allergies:  Sulfamethoxazole-trimethoprim    Objective     Vital Signs  Temp:  [97.8 °F (36.6 °C)-98.7 °F (37.1 °C)] 98 °F (36.7 °C)  Heart Rate:  [] 67  Resp:   [18-25] 23  BP: (131-173)/(72-93) 143/72     Physical Exam:      General Appearance:    Alert, cooperative, in no acute distress, alopecia, fully oriented   Head:    Normocephalic, without obvious abnormality, atraumatic   Eyes:            Lids and lashes normal, conjunctivae and sclerae normal, no   icterus, no pallor, corneas clear, PERRLA   Ears:    Ears appear intact with no abnormalities noted   Throat:   No oral lesions, no thrush, oral mucosa moist   Neck:   No adenopathy, supple, trachea midline, no thyromegaly, no   carotid bruit, no JVD   Lungs:     Clear to auscultation,respirations regular, even and                  unlabored    Heart:    Regular rhythm and normal rate, normal S1 and S2, no            murmur, no gallop, no rub, no click   Chest Wall:  Port is nontender   Abdomen:     Normal bowel sounds, no masses, no organomegaly, soft        non-tender, non-distended, no guarding, no rebound                tenderness   Extremities:   Moves all extremities well, no edema, no cyanosis, no             redness   Pulses:   Pulses palpable and equal bilaterally   Skin:   No bleeding, bruising or rash   Lymph nodes:   No palpable adenopathy   Neurologic:   Cranial nerves 2 - 12 grossly intact, sensation intact, DTR       present and equal bilaterally       Results Review:     Imaging Results (Last 24 Hours)       ** No results found for the last 24 hours. **             Lab Results (last 24 hours)       Procedure Component Value Units Date/Time    Comprehensive Metabolic Panel [[id number]]  (Abnormal) Collected: 05/01/25 0603    Specimen: Blood Updated: 05/01/25 0649     Glucose 176 mg/dL      BUN 14 mg/dL      Creatinine 0.70 mg/dL      Sodium 140 mmol/L      Potassium 4.4 mmol/L      Comment: Slight hemolysis detected by analyzer. Result may be falsely elevated.        Chloride 106 mmol/L      CO2 20.8 mmol/L      Calcium 9.2 mg/dL      Total Protein 5.5 g/dL      Albumin 4.0 g/dL      ALT (SGPT) 47 U/L       AST (SGOT) 45 U/L      Comment: Slight hemolysis detected by analyzer. Result may be falsely elevated.        Alkaline Phosphatase 116 U/L      Total Bilirubin 1.7 mg/dL      Globulin 1.5 gm/dL      A/G Ratio 2.7 g/dL      BUN/Creatinine Ratio 20.0     Anion Gap 13.2 mmol/L      eGFR 93.2 mL/min/1.73     Narrative:      GFR Categories in Chronic Kidney Disease (CKD)              GFR Category          GFR (mL/min/1.73)    Interpretation  G1                    90 or greater        Normal or high (1)  G2                    60-89                Mild decrease (1)  G3a                   45-59                Mild to moderate decrease  G3b                   30-44                Moderate to severe decrease  G4                    15-29                Severe decrease  G5                    14 or less           Kidney failure    (1)In the absence of evidence of kidney disease, neither GFR category G1 or G2 fulfill the criteria for CKD.    eGFR calculation 2021 CKD-EPI creatinine equation, which does not include race as a factor    CBC & Differential [[id number]]  (Abnormal) Collected: 05/01/25 0603    Specimen: Blood Updated: 05/01/25 0638    Narrative:      The following orders were created for panel order CBC & Differential.  Procedure                               Abnormality         Status                     ---------                               -----------         ------                     CBC Auto Differential[[id number]]        Abnormal            Final result               Scan Slide[[id number]]                                       Final result                 Please view results for these tests on the individual orders.    CBC Auto Differential [[id number]]  (Abnormal) Collected: 05/01/25 0603    Specimen: Blood Updated: 05/01/25 0638     WBC 22.32 10*3/mm3      RBC 3.32 10*6/mm3      Hemoglobin 10.9 g/dL      Hematocrit 36.9 %      .1 fL      MCH 32.8 pg      MCHC 29.5 g/dL      RDW 17.7 %      RDW-SD 72.4 fl       MPV 9.9 fL      Platelets 223 10*3/mm3     Narrative:      The previously reported component NRBC is no longer being reported. Previous result was 1.5 /100 WBC (Reference Range: 0.0-0.2 /100 WBC) on 5/1/2025 at 0615 EDT.    Scan Slide [[id number]] Collected: 05/01/25 0603    Specimen: Blood Updated: 05/01/25 0638     Scan Slide --     Comment: See Manual Differential Results       Manual Differential [[id number]]  (Abnormal) Collected: 05/01/25 0603    Specimen: Blood Updated: 05/01/25 0638     Neutrophil % 55.0 %      Lymphocyte % 11.0 %      Monocyte % 1.0 %      Bands %  25.0 %      Metamyelocyte % 2.0 %      Myelocyte % 5.0 %      Atypical Lymphocyte % 1.0 %      Neutrophils Absolute 17.86 10*3/mm3      Lymphocytes Absolute 2.68 10*3/mm3      Monocytes Absolute 0.22 10*3/mm3      nRBC 3.0 /100 WBC      Anisocytosis Slight/1+     Hypochromia Slight/1+     Ovalocytes Slight/1+     Poikilocytes Slight/1+     Polychromasia Slight/1+     Toxic Granulation Slight/1+     Vacuolated Neutrophils Slight/1+     Platelet Morphology Normal    aPTT [[id number]]  (Normal) Collected: 05/01/25 0603    Specimen: Blood Updated: 05/01/25 0634     PTT 23.6 seconds     Protime-INR [[id number]]  (Normal) Collected: 05/01/25 0603    Specimen: Blood Updated: 05/01/25 0634     Protime 13.3 Seconds      INR 1.02             I reviewed the patient's new clinical results.    Assessment & Plan       Bright red blood per rectum    Lymphoma malignant, large cell    Leukocytosis    Essential hypertension    -Clinical picture appears consistent with likely diverticular bleed that she had sudden onset of painless rectal bleeding.  Will monitor hemoglobin.  Keep her NPO.  Will defer to gastroenterology if bowel purge is indicated.  Scan 3/18/2025 showed no significant abdominal pathology with improving lymphadenopathy.    -continue dapsone and acyclovir for prophylaxis related to chemotherapy    -continue home meds for hypertension    - SCDs for  DVT  prophylaxis    CODE STATUS:  Code status (Patient has no pulse and is not breathing):  CPR (Attempt to Resuscitate)  Medical Interventions (Patient has pulse or is breathing):  Full Support  Level of Support Discussed with:  Patient    Admission Status:  I believe this patient meets observation status    Expected length of stay:  1 midnights or greater    I discussed the patient's findings and my recommendations with patient.     Janessa Cornell MD  05/01/25  11:48 EDT

## 2025-05-01 NOTE — CONSULTS
GI CONSULT  NOTE:    Referring Provider:  Janessa Cornell MD    Chief complaint: Rectal bleeding    Subjective .     History of present illness: Catarina Mclean is a 70 y.o. female with PMH non-Hodgkin lymphoma followed by Dr. Sethi.  She presented to the hospital due to rectal bleeding which is why GI was consulted.    As of 4 AM yesterday patient had 3 bowel movements that were BRBPR with clots.  The last one was 7 AM this morning.  No bowel movement since then.  She denies any rectal or abdominal pain.  She is struggled with constipation since starting her chemo in January.  She can go as long as 2 days without a bowel movement.  She added Activia to her diet which helped some with her constipation.  She denies any use of MiraLAX Metamucil or other laxatives.  She denies any opioid pain medication use. She denies any nausea, vomiting or dysphagia.  She denies any NSAID use.  Last week as an outpatient she had low hemoglobin, 6.7 and received blood transfusion.  She denies any blood loss that she was able to observe around that time.  No rectal bleeding, vaginal, epistaxis, falls, or bruises.     Endo History:  6/2/2015 colonoscopy by Dr. Gordon showed moderate diverticulosis of the sigmoid colon.  Nonspecific colitis (biopsy consistent with ischemic colitis).  10-year recall.    Past Medical History:  Past Medical History:   Diagnosis Date    Drug therapy     Hyperlipidemia     Hypertension     Non Hodgkin's lymphoma 2009       Past Surgical History:  Past Surgical History:   Procedure Laterality Date    BREAST BIOPSY      HYSTERECTOMY         Social History:  Social History     Tobacco Use    Smoking status: Never     Passive exposure: Never    Smokeless tobacco: Never   Vaping Use    Vaping status: Never Used   Substance Use Topics    Alcohol use: Not Currently    Drug use: Never       Family History:  Family History   Problem Relation Age of Onset    Liver cancer Brother     Breast cancer Maternal Aunt         Medications:  Medications Prior to Admission   Medication Sig Dispense Refill Last Dose/Taking    acyclovir (ZOVIRAX) 400 MG tablet Take 1 tablet by mouth 2 (Two) Times a Day. Take no more than 5 doses a day. 60 tablet 5 4/30/2025    allopurinol (ZYLOPRIM) 300 MG tablet TAKE 1 TABLET BY MOUTH DAILY 30 tablet 0 4/30/2025    amLODIPine (NORVASC) 5 MG tablet Take 1 tablet by mouth Daily.   4/30/2025    aspirin 81 MG chewable tablet Chew 1 tablet Daily.   4/30/2025    carvedilol (COREG) 6.25 MG tablet Take 1 tablet by mouth 2 (Two) Times a Day. 180 tablet 3 4/30/2025    dapsone 100 MG tablet Take 1 tablet by mouth Daily. 30 tablet 3 4/30/2025    empagliflozin (Jardiance) 10 MG tablet tablet Take 1 tablet by mouth Daily.   4/30/2025    lisinopril (PRINIVIL,ZESTRIL) 20 MG tablet Take 1 tablet by mouth Daily.   4/30/2025    Omega-3 Fatty Acids (fish oil) 1000 MG capsule capsule Take 2 capsules by mouth Daily With Breakfast.   4/30/2025    potassium chloride 10 MEQ CR tablet Take 1 tablet by mouth Daily for 30 days. 30 tablet 0 4/30/2025    pravastatin (PRAVACHOL) 20 MG tablet Take 1 tablet by mouth Daily.   4/30/2025    predniSONE (DELTASONE) 50 MG tablet TAKE 2 TABLETS BY MOUTH DAILY. TAKE WITH FOOD. BRING THE FIRST DOSE TO CHEMOTHERAPY APPOINTMENT. 10 tablet 5 4/30/2025    traZODone (DESYREL) 50 MG tablet Take 1.5 tablets by mouth Every Night.   4/30/2025    lidocaine-prilocaine (EMLA) 2.5-2.5 % cream Apply 1 Application topically to the appropriate area as directed As Needed (apply 1 hour prior to port access). 30 g 2     ondansetron (ZOFRAN) 8 MG tablet Take 1 tablet by mouth 3 (Three) Times a Day As Needed for Nausea or Vomiting. 30 tablet 5        Scheduled Meds:acyclovir, 400 mg, Oral, BID  allopurinol, 300 mg, Oral, Daily  amLODIPine, 5 mg, Oral, Daily  carvedilol, 6.25 mg, Oral, BID  dapsone, 100 mg, Oral, Daily  lisinopril, 20 mg, Oral, Daily  traZODone, 75 mg, Oral, Nightly      Continuous Infusions:sodium  "chloride, 100 mL/hr      PRN Meds:.  [COMPLETED] Insert Peripheral IV **AND** sodium chloride    ALLERGIES:  Sulfamethoxazole-trimethoprim    ROS:  Review of Systems    The following systems were reviewed and negative;   Constitution:  No fevers, chills, no unintentional weight loss  Skin: no rash, no jaundice  Eyes:  No blurry vision, no eye pain  HENT:  No change in hearing or smell  Resp:  No dyspnea or cough  CV:  No chest pain or palpitations  :  No dysuria, hematuria  Musculoskeletal:  No leg cramps or arthralgias  Neuro:  No tremor, no numbness  Psych:  No depression or confusion    Objective     Vital Signs:   Vitals:    05/01/25 0942 05/01/25 0947 05/01/25 1021 05/01/25 1145   BP: 167/83 173/74  143/72   BP Location:    Right arm   Patient Position:    Lying   Pulse: 73 60  67   Resp: 18 21 23   Temp:   97.8 °F (36.6 °C) 98 °F (36.7 °C)   TempSrc:   Oral Oral   SpO2: 92% 92%  92%   Weight:   71.4 kg (157 lb 6.5 oz)    Height:   157.5 cm (62\")        Physical Exam:     General Appearance:    Awake and alert, in no acute distress   Head:    Normocephalic, without obvious abnormality, atraumatic   Eyes:            Conjunctivae normal, anicteric sclerae, pupils equal   Ears:    Ears appear intact with no abnormalities noted   Throat:   No oral lesions, no thrush, oral mucosa moist   Neck:   Supple, no JVD           Chest Wall:    No abnormalities observed   Abdomen:     Normal bowel sounds, soft, nontender, no rebound or guarding, nondistended   Rectal:     Deferred   Extremities:   Moves all extremities, no edema, no cyanosis   Pulses:   Pulses palpable and equal bilaterally   Skin:   No rash, no jaundice, normal palpation             Results Review:   I reviewed the patient's labs and imaging.  CBC    Results from last 7 days   Lab Units 05/01/25  0603 04/30/25  1429   WBC 10*3/mm3 22.32* 17.41*   HEMOGLOBIN g/dL 10.9* 11.1*   PLATELETS 10*3/mm3 223 196     CMP   Results from last 7 days   Lab Units " "05/01/25  0603 04/30/25  1429   SODIUM mmol/L 140 138   POTASSIUM mmol/L 4.4 4.3   CHLORIDE mmol/L 106 103   CO2 mmol/L 20.8* 25.2   BUN mg/dL 14 15   CREATININE mg/dL 0.70 0.70   GLUCOSE mg/dL 176* 262*   ALBUMIN g/dL 4.0 4.0   BILIRUBIN mg/dL 1.7* 1.8*   ALK PHOS U/L 116 117   AST (SGOT) U/L 45* 35*   ALT (SGPT) U/L 47* 46*   MAGNESIUM mg/dL  --  2.2   PHOSPHORUS mg/dL  --  2.7     Cr Clearance Estimated Creatinine Clearance: 69.2 mL/min (by C-G formula based on SCr of 0.7 mg/dL).  Coag   Results from last 7 days   Lab Units 05/01/25  0603   INR  1.02   APTT seconds 23.6     HbA1C   Lab Results   Component Value Date    HGBA1C <4.20 (L) 04/23/2025     Blood Glucose No results found for: \"POCGLU\"  Infection     UA      Radiology(recent) No radiology results for the last day      ASSESSMENT AND PLAN:  70 y.o. female with PMH non-Hodgkin lymphoma followed by Dr. Sethi.  She presented to the hospital due to rectal bleeding which is why GI was consulted.    Problem List:  Hematochezia  History of ischemic colitis  Diverticulosis  Elevated LFTs  Non-Hodgkin lymphoma follows with Dr. Sethi on R-CEOP with IT methotrexate    Plan:  - Okay for clear liquid diet. Will initiate Surepep, plan for colonoscopy tomorrow. NPO once Suprep is completed.   - Differential may include diverticular bleed, ischemic colitis given drop in Hgb last week (was as low as 6.7), among others.   - Hemoglobin is now stable at 10.9.   - Continue to monitor H/H and transfuse if <7.   - Total bili has been elevated since February.  Alk phos, AST and ALT have waxed and waned with elevation in normal levels. Will check liver serologies and RUQ u/s.  Elevation could be related to methotrexate and/or chemo.    I discussed the patients findings and my recommendations with the patient. Patient was seen with GI attending, addendum to follow. We appreciate the referral.    Electronically signed by Rolando Huizar PA-C, 05/01/25, 12:29 PM EDT.  "
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
## 2025-05-01 NOTE — PLAN OF CARE
Goal Outcome Evaluation:              Outcome Evaluation: pt alert, able to make needs known. call light in reach. US today and bowel prep for conoloscopy tomorrow

## 2025-05-01 NOTE — CASE MANAGEMENT/SOCIAL WORK
Discharge Planning Assessment   [facility]     Patient Name: Catarina Mclean  MRN: [id number]  Today's Date: 5/1/2025    Admit Date: 5/1/2025    Plan: From Home; Watch for New O2 Needs   Discharge Needs Assessment       Row Name 05/01/25 0955       Living Environment    People in Home alone    Current Living Arrangements home    Potentially Unsafe Housing Conditions none    In the past 12 months has the electric, gas, oil, or water company threatened to shut off services in your home? No    Primary Care Provided by self    Provides Primary Care For no one, unable/limited ability to care for self    Family Caregiver if Needed child(miley), adult    Family Caregiver Names son-Aston    Quality of Family Relationships unable to assess    Able to Return to Prior Arrangements yes    Living Arrangement Comments Home with son       Resource/Environmental Concerns    Resource/Environmental Concerns none    Transportation Concerns none       Transportation Needs    In the past 12 months, has lack of transportation kept you from medical appointments or from getting medications? no    In the past 12 months, has lack of transportation kept you from meetings, work, or from getting things needed for daily living? No       Food Insecurity    Within the past 12 months, you worried that your food would run out before you got the money to buy more. Never true    Within the past 12 months, the food you bought just didn't last and you didn't have money to get more. Never true       Transition Planning    Patient/Family Anticipates Transition to home with family    Patient/Family Anticipated Services at Transition none    Transportation Anticipated family or friend will provide       Discharge Needs Assessment    Readmission Within the Last 30 Days no previous admission in last 30 days    Equipment Currently Used at Home none    Concerns to be Addressed discharge planning    Do you want help finding or keeping work or a job? I do not need or  want help    Do you want help with school or training? For example, starting or completing job training or getting a high school diploma, GED or equivalent No    Anticipated Changes Related to Illness none    Equipment Needed After Discharge oxygen    Provided Post Acute Provider List? N/A    Provided Post Acute Provider Quality & Resource List? N/A    Offered/Gave Vendor List no                   Discharge Plan       Row Name 05/01/25 0956       Plan    Plan From Home; Watch for New O2 Needs    Patient/Family in Agreement with Plan unable to assess    Plan Comments Met with patient at bedside, confirmed PCP and Pharm. States she is independent with ADLs and IADLs, but is staying with her son, Aston, during her 6 weeks of Chemo treatment. Denies financial concerns and family will provide dc transportation. Currently on 3L NC with RA as baseline, watch  for new home O2 needs. . Discussed dc plan, declines HH or SNF needs at this time and plans to return to son's home.                       Demographic Summary       Row Name 05/01/25 0955       General Information    Arrived From home    Referral Source emergency department    Reason for Consult discharge planning    Preferred Language English       Contact Information    Permission Granted to Share Info With                    Functional Status       Row Name 05/01/25 0955       Functional Status    Usual Activity Tolerance good    Current Activity Tolerance fair       Physical Activity    On average, how many days per week do you engage in moderate to strenuous exercise (like a brisk walk)? 0 days    On average, how many minutes do you engage in exercise at this level? 0 min    Number of minutes of exercise per week 0       Assessment of Health Literacy    How often do you have someone help you read hospital materials? Occasionally    How often do you have problems learning about your medical condition because of difficulty understanding written  information? Occasionally    How often do you have a problem understanding what is told to you about your medical condition? Sometimes    How confident are you filling out medical forms by yourself? Quite a bit    Health Literacy Good       Functional Status, IADL    Medications independent    Meal Preparation independent    Housekeeping independent    Laundry independent    Shopping independent    If for any reason you need help with day-to-day activities such as bathing, preparing meals, shopping, managing finances, etc., do you get the help you need? I get all the help I need       Mental Status    General Appearance WDL WDL       Mental Status Summary    Recent Changes in Mental Status/Cognitive Functioning no changes       Employment/    Employment Status retired             Met with patient in room wearing PPE: mask    Maintained distance greater than six feet and spent less than 15 minutes in the room    Damrais Armstrong RN    Phone [phone]  Fax [phone]

## 2025-05-02 ENCOUNTER — ANESTHESIA (OUTPATIENT)
Dept: GASTROENTEROLOGY | Facility: HOSPITAL | Age: 70
End: 2025-05-02
Payer: MEDICARE

## 2025-05-02 ENCOUNTER — ANESTHESIA EVENT (OUTPATIENT)
Dept: GASTROENTEROLOGY | Facility: HOSPITAL | Age: 70
End: 2025-05-02
Payer: MEDICARE

## 2025-05-02 ENCOUNTER — ON CAMPUS - OUTPATIENT (AMBULATORY)
Dept: URBAN - METROPOLITAN AREA HOSPITAL 85 | Facility: HOSPITAL | Age: 70
End: 2025-05-02
Payer: MEDICARE

## 2025-05-02 VITALS
HEIGHT: 62 IN | SYSTOLIC BLOOD PRESSURE: 148 MMHG | BODY MASS INDEX: 28.97 KG/M2 | TEMPERATURE: 97.7 F | HEART RATE: 72 BPM | OXYGEN SATURATION: 95 % | RESPIRATION RATE: 16 BRPM | DIASTOLIC BLOOD PRESSURE: 82 MMHG | WEIGHT: 157.41 LBS

## 2025-05-02 DIAGNOSIS — D12.4 BENIGN NEOPLASM OF DESCENDING COLON: ICD-10-CM

## 2025-05-02 DIAGNOSIS — K64.8 OTHER HEMORRHOIDS: ICD-10-CM

## 2025-05-02 DIAGNOSIS — K57.30 DIVERTICULOSIS OF LARGE INTESTINE WITHOUT PERFORATION OR ABS: ICD-10-CM

## 2025-05-02 DIAGNOSIS — K62.5 HEMORRHAGE OF ANUS AND RECTUM: ICD-10-CM

## 2025-05-02 LAB
ANA SER QL: NEGATIVE
ANION GAP SERPL CALCULATED.3IONS-SCNC: 12.8 MMOL/L (ref 5–15)
ANISOCYTOSIS BLD QL: ABNORMAL
BUN SERPL-MCNC: 14 MG/DL (ref 8–23)
BUN/CREAT SERPL: 19.4 (ref 7–25)
CALCIUM SPEC-SCNC: 8.8 MG/DL (ref 8.6–10.5)
CHLORIDE SERPL-SCNC: 100 MMOL/L (ref 98–107)
CO2 SERPL-SCNC: 21.2 MMOL/L (ref 22–29)
CREAT SERPL-MCNC: 0.72 MG/DL (ref 0.57–1)
DEPRECATED RDW RBC AUTO: 74.2 FL (ref 37–54)
EGFRCR SERPLBLD CKD-EPI 2021: 90.1 ML/MIN/1.73
ERYTHROCYTE [DISTWIDTH] IN BLOOD BY AUTOMATED COUNT: 17.8 % (ref 12.3–15.4)
GLUCOSE SERPL-MCNC: 111 MG/DL (ref 65–99)
HCT VFR BLD AUTO: 32.7 % (ref 34–46.6)
HGB BLD-MCNC: 9.5 G/DL (ref 12–15.9)
LYMPHOCYTES # BLD MANUAL: 2.41 10*3/MM3 (ref 0.7–3.1)
LYMPHOCYTES NFR BLD MANUAL: 1 % (ref 5–12)
MACROCYTES BLD QL SMEAR: ABNORMAL
MCH RBC QN AUTO: 32.9 PG (ref 26.6–33)
MCHC RBC AUTO-ENTMCNC: 29.1 G/DL (ref 31.5–35.7)
MCV RBC AUTO: 113.1 FL (ref 79–97)
METAMYELOCYTES NFR BLD MANUAL: 2 % (ref 0–0)
MONOCYTES # BLD: 0.19 10*3/MM3 (ref 0.1–0.9)
MYELOCYTES NFR BLD MANUAL: 4 % (ref 0–0)
NEUTROPHILS # BLD AUTO: 14.82 10*3/MM3 (ref 1.7–7)
NEUTROPHILS NFR BLD MANUAL: 68 % (ref 42.7–76)
NEUTS BAND NFR BLD MANUAL: 12 % (ref 0–5)
NRBC SPEC MANUAL: 2 /100 WBC (ref 0–0.2)
PLAT MORPH BLD: NORMAL
PLATELET # BLD AUTO: 134 10*3/MM3 (ref 140–450)
PMV BLD AUTO: 8.6 FL (ref 6–12)
POIKILOCYTOSIS BLD QL SMEAR: ABNORMAL
POLYCHROMASIA BLD QL SMEAR: ABNORMAL
POTASSIUM SERPL-SCNC: 3.9 MMOL/L (ref 3.5–5.2)
RBC # BLD AUTO: 2.89 10*6/MM3 (ref 3.77–5.28)
SCAN SLIDE: NORMAL
SODIUM SERPL-SCNC: 134 MMOL/L (ref 136–145)
VARIANT LYMPHS NFR BLD MANUAL: 13 % (ref 19.6–45.3)
WBC MORPH BLD: NORMAL
WBC NRBC COR # BLD AUTO: 18.53 10*3/MM3 (ref 3.4–10.8)

## 2025-05-02 PROCEDURE — 25010000002 PROPOFOL 200 MG/20ML EMULSION: Performed by: NURSE ANESTHETIST, CERTIFIED REGISTERED

## 2025-05-02 PROCEDURE — 86015 ACTIN ANTIBODY EACH: CPT

## 2025-05-02 PROCEDURE — G0378 HOSPITAL OBSERVATION PER HR: HCPCS

## 2025-05-02 PROCEDURE — 63710000001 ACYCLOVIR 200 MG CAPSULE: Performed by: INTERNAL MEDICINE

## 2025-05-02 PROCEDURE — 86038 ANTINUCLEAR ANTIBODIES: CPT

## 2025-05-02 PROCEDURE — 25810000003 SODIUM CHLORIDE 0.9 % SOLUTION: Performed by: NURSE ANESTHETIST, CERTIFIED REGISTERED

## 2025-05-02 PROCEDURE — 25810000003 SODIUM CHLORIDE 0.9 % SOLUTION: Performed by: INTERNAL MEDICINE

## 2025-05-02 PROCEDURE — 86381 MITOCHONDRIAL ANTIBODY EACH: CPT

## 2025-05-02 PROCEDURE — A9270 NON-COVERED ITEM OR SERVICE: HCPCS | Performed by: INTERNAL MEDICINE

## 2025-05-02 PROCEDURE — 63710000001: Performed by: INTERNAL MEDICINE

## 2025-05-02 PROCEDURE — 63710000001 AMLODIPINE 5 MG TABLET: Performed by: INTERNAL MEDICINE

## 2025-05-02 PROCEDURE — 80048 BASIC METABOLIC PNL TOTAL CA: CPT | Performed by: INTERNAL MEDICINE

## 2025-05-02 PROCEDURE — 85007 BL SMEAR W/DIFF WBC COUNT: CPT | Performed by: INTERNAL MEDICINE

## 2025-05-02 PROCEDURE — 25010000002 LIDOCAINE PF 2% 2 % SOLUTION: Performed by: NURSE ANESTHETIST, CERTIFIED REGISTERED

## 2025-05-02 PROCEDURE — 86376 MICROSOMAL ANTIBODY EACH: CPT

## 2025-05-02 PROCEDURE — 99232 SBSQ HOSP IP/OBS MODERATE 35: CPT | Performed by: STUDENT IN AN ORGANIZED HEALTH CARE EDUCATION/TRAINING PROGRAM

## 2025-05-02 PROCEDURE — 63710000001 ALLOPURINOL 300 MG TABLET: Performed by: INTERNAL MEDICINE

## 2025-05-02 PROCEDURE — 88305 TISSUE EXAM BY PATHOLOGIST: CPT | Performed by: INTERNAL MEDICINE

## 2025-05-02 PROCEDURE — 85025 COMPLETE CBC W/AUTO DIFF WBC: CPT | Performed by: INTERNAL MEDICINE

## 2025-05-02 PROCEDURE — 63710000001 LISINOPRIL 20 MG TABLET: Performed by: INTERNAL MEDICINE

## 2025-05-02 PROCEDURE — 63710000001 CARVEDILOL 6.25 MG TABLET: Performed by: INTERNAL MEDICINE

## 2025-05-02 PROCEDURE — 45385 COLONOSCOPY W/LESION REMOVAL: CPT | Performed by: INTERNAL MEDICINE

## 2025-05-02 RX ORDER — HYDROCORTISONE ACETATE 25 MG/1
25 SUPPOSITORY RECTAL 2 TIMES DAILY
Qty: 10 EACH | Refills: 0 | Status: SHIPPED | OUTPATIENT
Start: 2025-05-02

## 2025-05-02 RX ORDER — LABETALOL HYDROCHLORIDE 5 MG/ML
5 INJECTION, SOLUTION INTRAVENOUS
Status: DISCONTINUED | OUTPATIENT
Start: 2025-05-02 | End: 2025-05-02 | Stop reason: HOSPADM

## 2025-05-02 RX ORDER — GLYCOPYRROLATE 1 MG/5 ML
SYRINGE (ML) INTRAVENOUS AS NEEDED
Status: DISCONTINUED | OUTPATIENT
Start: 2025-05-02 | End: 2025-05-02 | Stop reason: SURG

## 2025-05-02 RX ORDER — DIPHENHYDRAMINE HYDROCHLORIDE 50 MG/ML
12.5 INJECTION, SOLUTION INTRAMUSCULAR; INTRAVENOUS
Status: DISCONTINUED | OUTPATIENT
Start: 2025-05-02 | End: 2025-05-02 | Stop reason: HOSPADM

## 2025-05-02 RX ORDER — HYDROCORTISONE ACETATE 25 MG/1
25 SUPPOSITORY RECTAL 2 TIMES DAILY
Status: DISCONTINUED | OUTPATIENT
Start: 2025-05-02 | End: 2025-05-02 | Stop reason: HOSPADM

## 2025-05-02 RX ORDER — IPRATROPIUM BROMIDE AND ALBUTEROL SULFATE 2.5; .5 MG/3ML; MG/3ML
3 SOLUTION RESPIRATORY (INHALATION) ONCE AS NEEDED
Status: DISCONTINUED | OUTPATIENT
Start: 2025-05-02 | End: 2025-05-02 | Stop reason: HOSPADM

## 2025-05-02 RX ORDER — LIDOCAINE HYDROCHLORIDE 20 MG/ML
INJECTION, SOLUTION EPIDURAL; INFILTRATION; INTRACAUDAL; PERINEURAL AS NEEDED
Status: DISCONTINUED | OUTPATIENT
Start: 2025-05-02 | End: 2025-05-02 | Stop reason: SURG

## 2025-05-02 RX ORDER — HYDRALAZINE HYDROCHLORIDE 20 MG/ML
5 INJECTION INTRAMUSCULAR; INTRAVENOUS
Status: DISCONTINUED | OUTPATIENT
Start: 2025-05-02 | End: 2025-05-02 | Stop reason: HOSPADM

## 2025-05-02 RX ORDER — ONDANSETRON 2 MG/ML
4 INJECTION INTRAMUSCULAR; INTRAVENOUS ONCE AS NEEDED
Status: DISCONTINUED | OUTPATIENT
Start: 2025-05-02 | End: 2025-05-02 | Stop reason: HOSPADM

## 2025-05-02 RX ORDER — PROPOFOL 10 MG/ML
INJECTION, EMULSION INTRAVENOUS CONTINUOUS PRN
Status: DISCONTINUED | OUTPATIENT
Start: 2025-05-02 | End: 2025-05-02 | Stop reason: SURG

## 2025-05-02 RX ORDER — SODIUM CHLORIDE 9 MG/ML
INJECTION, SOLUTION INTRAVENOUS CONTINUOUS PRN
Status: DISCONTINUED | OUTPATIENT
Start: 2025-05-02 | End: 2025-05-02 | Stop reason: SURG

## 2025-05-02 RX ORDER — EPHEDRINE SULFATE/0.9% NACL/PF 25 MG/5 ML
5 SYRINGE (ML) INTRAVENOUS ONCE AS NEEDED
Status: DISCONTINUED | OUTPATIENT
Start: 2025-05-02 | End: 2025-05-02 | Stop reason: HOSPADM

## 2025-05-02 RX ADMIN — PROPOFOL 40 MG: 10 INJECTION, EMULSION INTRAVENOUS at 09:34

## 2025-05-02 RX ADMIN — PROPOFOL 150 MCG/KG/MIN: 10 INJECTION, EMULSION INTRAVENOUS at 09:30

## 2025-05-02 RX ADMIN — PROPOFOL 50 MG: 10 INJECTION, EMULSION INTRAVENOUS at 09:31

## 2025-05-02 RX ADMIN — Medication 1 BOTTLE: at 01:57

## 2025-05-02 RX ADMIN — AMLODIPINE BESYLATE 5 MG: 5 TABLET ORAL at 13:10

## 2025-05-02 RX ADMIN — ACYCLOVIR 400 MG: 200 CAPSULE ORAL at 13:10

## 2025-05-02 RX ADMIN — LIDOCAINE HYDROCHLORIDE 60 MG: 20 INJECTION, SOLUTION EPIDURAL; INFILTRATION; INTRACAUDAL; PERINEURAL at 09:30

## 2025-05-02 RX ADMIN — SODIUM CHLORIDE 100 ML/HR: 9 INJECTION, SOLUTION INTRAVENOUS at 06:15

## 2025-05-02 RX ADMIN — DAPSONE 100 MG: 100 TABLET ORAL at 13:09

## 2025-05-02 RX ADMIN — LISINOPRIL 20 MG: 20 TABLET ORAL at 13:10

## 2025-05-02 RX ADMIN — PROPOFOL 50 MG: 10 INJECTION, EMULSION INTRAVENOUS at 09:33

## 2025-05-02 RX ADMIN — Medication 0.2 MG: at 09:27

## 2025-05-02 RX ADMIN — CARVEDILOL 6.25 MG: 6.25 TABLET, FILM COATED ORAL at 13:10

## 2025-05-02 RX ADMIN — SODIUM CHLORIDE: 9 INJECTION, SOLUTION INTRAVENOUS at 09:24

## 2025-05-02 RX ADMIN — ALLOPURINOL 300 MG: 300 TABLET ORAL at 13:10

## 2025-05-02 NOTE — DISCHARGE SUMMARY
Date of Discharge:  5/2/2025    Discharge Diagnosis:   Bright red blood per rectum  Lymphoma malignant, large cell  Leukocytosis  Essential hypertension    Presenting Problem/History of Present Illness  Active Hospital Problems    Diagnosis  POA    **Bright red blood per rectum [K62.5]  Yes    Leukocytosis [D72.829]  Yes    Essential hypertension [I10]  Yes    Lymphoma malignant, large cell [C85.80]  Yes      Resolved Hospital Problems   No resolved problems to display.          Hospital Course    Patient is a 70 y.o. female  undergoing chemotherapy for non-Hodgkin's lymphoma who presents from home with sudden onset of bright red blood per rectum.  She woke early in the morning and passing clots.  She states there is no diarrhea, just passing pure blood. Patient had colonoscopy with the following; 2 colon polyps in the descending and sigmoid colon which appeared benign and Moderate diverticulosis with several small openings in the descending and sigmoid colon with no bleeding.   Medium size nonbleeding internal hemorrhoids.Consider recall for colonoscopy in 7 years for adenomatous colon polyps, 5 years if sessile serrated adenoma.  Most likely bleeding hemorrhoids with thrombocytopenia. On discharge, no further bleeding and stable hemoglobin     Procedures Performed    Procedure(s):  COLONOSCOPY with POLYPECTOMY  -------------------  05/02 0928 Colonoscopy    Consults:   Consults       Date and Time Order Name Status Description    5/1/2025  8:25 AM Hematology & Oncology Inpatient Consult Completed     5/1/2025  8:25 AM Inpatient Gastroenterology Consult Completed     5/1/2025  7:00 AM Family Medicine Consult              Pertinent Test Results:    Lab Results (most recent)       Procedure Component Value Units Date/Time    OTTONIEL [[id number]]  (Normal) Collected: 05/02/25 0422    Specimen: Blood Updated: 05/02/25 0938     Antinuclear Antibodies (OTTONIEL) Negative    CBC & Differential [[id number]]  (Abnormal) Collected:  05/02/25 0549    Specimen: Blood Updated: 05/02/25 0655    Narrative:      The following orders were created for panel order CBC & Differential.  Procedure                               Abnormality         Status                     ---------                               -----------         ------                     CBC Auto Differential[[id number]]        Abnormal            Final result               Scan Slide[[id number]]                                       Final result                 Please view results for these tests on the individual orders.    CBC Auto Differential [[id number]]  (Abnormal) Collected: 05/02/25 0549    Specimen: Blood Updated: 05/02/25 0655     WBC 18.53 10*3/mm3      RBC 2.89 10*6/mm3      Hemoglobin 9.5 g/dL      Hematocrit 32.7 %      .1 fL      MCH 32.9 pg      MCHC 29.1 g/dL      RDW 17.8 %      RDW-SD 74.2 fl      MPV 8.6 fL      Platelets 134 10*3/mm3     Narrative:      The previously reported component NRBC is no longer being reported. Previous result was 1.5 /100 WBC (Reference Range: 0.0-0.2 /100 WBC) on 5/2/2025 at 0629 EDT.    Scan Slide [[id number]] Collected: 05/02/25 0549    Specimen: Blood Updated: 05/02/25 0655     Scan Slide --     Comment: See Manual Differential Results       Manual Differential [[id number]]  (Abnormal) Collected: 05/02/25 0549    Specimen: Blood Updated: 05/02/25 0655     Neutrophil % 68.0 %      Lymphocyte % 13.0 %      Monocyte % 1.0 %      Bands %  12.0 %      Metamyelocyte % 2.0 %      Myelocyte % 4.0 %      Neutrophils Absolute 14.82 10*3/mm3      Lymphocytes Absolute 2.41 10*3/mm3      Monocytes Absolute 0.19 10*3/mm3      nRBC 2.0 /100 WBC      Anisocytosis Slight/1+     Macrocytes Slight/1+     Poikilocytes Slight/1+     Polychromasia Slight/1+     WBC Morphology Normal     Platelet Morphology Normal    Basic Metabolic Panel [[id number]]  (Abnormal) Collected: 05/02/25 0422    Specimen: Blood Updated: 05/02/25 0607     Glucose 111 mg/dL       BUN 14 mg/dL      Creatinine 0.72 mg/dL      Sodium 134 mmol/L      Potassium 3.9 mmol/L      Comment: Specimen hemolyzed.  Result may be falsely elevated.        Chloride 100 mmol/L      CO2 21.2 mmol/L      Calcium 8.8 mg/dL      BUN/Creatinine Ratio 19.4     Anion Gap 12.8 mmol/L      eGFR 90.1 mL/min/1.73     Narrative:      GFR Categories in Chronic Kidney Disease (CKD)              GFR Category          GFR (mL/min/1.73)    Interpretation  G1                    90 or greater        Normal or high (1)  G2                    60-89                Mild decrease (1)  G3a                   45-59                Mild to moderate decrease  G3b                   30-44                Moderate to severe decrease  G4                    15-29                Severe decrease  G5                    14 or less           Kidney failure    (1)In the absence of evidence of kidney disease, neither GFR category G1 or G2 fulfill the criteria for CKD.    eGFR calculation 2021 CKD-EPI creatinine equation, which does not include race as a factor    Anti-microsomal Antibody [[id number]] Collected: 05/02/25 0422    Specimen: Blood Updated: 05/02/25 0533    Anti-Smooth Muscle Antibody Titer [[id number]] Collected: 05/02/25 0422    Specimen: Blood Updated: 05/02/25 0533    Mitochondrial Antibodies, M2 [[id number]] Collected: 05/02/25 0422    Specimen: Blood Updated: 05/02/25 0533    Gastrointestinal Panel, PCR - Stool, Per Rectum [[id number]]  (Normal) Collected: 05/01/25 1738    Specimen: Stool from Per Rectum Updated: 05/01/25 2036     Campylobacter Not Detected     Plesiomonas shigelloides Not Detected     Salmonella Not Detected     Vibrio Not Detected     Vibrio cholerae Not Detected     Yersinia enterocolitica Not Detected     Enteroaggregative E. coli (EAEC) Not Detected     Enteropathogenic E. coli (EPEC) Not Detected     Enterotoxigenic E. coli (ETEC) lt/st Not Detected     Shiga-like toxin-producing E. coli (STEC) stx1/stx2 Not  Detected     Shigella/Enteroinvasive E. coli (EIEC) Not Detected     Cryptosporidium Not Detected     Cyclospora cayetanensis Not Detected     Entamoeba histolytica Not Detected     Giardia lamblia Not Detected     Adenovirus F40/41 Not Detected     Astrovirus Not Detected     Norovirus GI/GII Not Detected     Rotavirus A Not Detected     Sapovirus (I, II, IV or V) Not Detected    Hepatitis Panel, Acute [[id number]]  (Normal) Collected: 05/01/25 1747    Specimen: Blood Updated: 05/01/25 1818     Hepatitis B Surface Ag Non-Reactive     Hep A IgM Non-Reactive     Hep B C IgM Non-Reactive     Hepatitis C Ab Non-Reactive    Narrative:      Results may be falsely decreased if patient taking Biotin.     Alpha - 1 - Antitrypsin [[id number]]  (Normal) Collected: 05/01/25 0603    Specimen: Blood Updated: 05/01/25 1633     ALPHA -1 ANTITRYPSIN 137 mg/dL     Ceruloplasmin [[id number]]  (Normal) Collected: 05/01/25 0603    Specimen: Blood Updated: 05/01/25 1633     Ceruloplasmin 20 mg/dL     Ferritin [[id number]]  (Abnormal) Collected: 05/01/25 0603    Specimen: Blood Updated: 05/01/25 1340     Ferritin 1,241.00 ng/mL     Narrative:      Results may be falsely decreased if patient taking Biotin.      Iron Profile [[id number]]  (Normal) Collected: 05/01/25 0603    Specimen: Blood Updated: 05/01/25 1340     Iron 106 mcg/dL      Iron Saturation (TSAT) 33 %      Transferrin 213 mg/dL      TIBC 317 mcg/dL     Comprehensive Metabolic Panel [[id number]]  (Abnormal) Collected: 05/01/25 0603    Specimen: Blood Updated: 05/01/25 0649     Glucose 176 mg/dL      BUN 14 mg/dL      Creatinine 0.70 mg/dL      Sodium 140 mmol/L      Potassium 4.4 mmol/L      Comment: Slight hemolysis detected by analyzer. Result may be falsely elevated.        Chloride 106 mmol/L      CO2 20.8 mmol/L      Calcium 9.2 mg/dL      Total Protein 5.5 g/dL      Albumin 4.0 g/dL      ALT (SGPT) 47 U/L      AST (SGOT) 45 U/L      Comment: Slight hemolysis detected by  analyzer. Result may be falsely elevated.        Alkaline Phosphatase 116 U/L      Total Bilirubin 1.7 mg/dL      Globulin 1.5 gm/dL      A/G Ratio 2.7 g/dL      BUN/Creatinine Ratio 20.0     Anion Gap 13.2 mmol/L      eGFR 93.2 mL/min/1.73     Narrative:      GFR Categories in Chronic Kidney Disease (CKD)              GFR Category          GFR (mL/min/1.73)    Interpretation  G1                    90 or greater        Normal or high (1)  G2                    60-89                Mild decrease (1)  G3a                   45-59                Mild to moderate decrease  G3b                   30-44                Moderate to severe decrease  G4                    15-29                Severe decrease  G5                    14 or less           Kidney failure    (1)In the absence of evidence of kidney disease, neither GFR category G1 or G2 fulfill the criteria for CKD.    eGFR calculation 2021 CKD-EPI creatinine equation, which does not include race as a factor    CBC & Differential [[id number]]  (Abnormal) Collected: 05/01/25 0603    Specimen: Blood Updated: 05/01/25 0638    Narrative:      The following orders were created for panel order CBC & Differential.  Procedure                               Abnormality         Status                     ---------                               -----------         ------                     CBC Auto Differential[[id number]]        Abnormal            Final result               Scan Slide[[id number]]                                       Final result                 Please view results for these tests on the individual orders.    CBC Auto Differential [[id number]]  (Abnormal) Collected: 05/01/25 0603    Specimen: Blood Updated: 05/01/25 0638     WBC 22.32 10*3/mm3      RBC 3.32 10*6/mm3      Hemoglobin 10.9 g/dL      Hematocrit 36.9 %      .1 fL      MCH 32.8 pg      MCHC 29.5 g/dL      RDW 17.7 %      RDW-SD 72.4 fl      MPV 9.9 fL      Platelets 223 10*3/mm3     Narrative:       The previously reported component NRBC is no longer being reported. Previous result was 1.5 /100 WBC (Reference Range: 0.0-0.2 /100 WBC) on 5/1/2025 at 0615 EDT.    Scan Slide [[id number]] Collected: 05/01/25 0603    Specimen: Blood Updated: 05/01/25 0638     Scan Slide --     Comment: See Manual Differential Results       Manual Differential [[id number]]  (Abnormal) Collected: 05/01/25 0603    Specimen: Blood Updated: 05/01/25 0638     Neutrophil % 55.0 %      Lymphocyte % 11.0 %      Monocyte % 1.0 %      Bands %  25.0 %      Metamyelocyte % 2.0 %      Myelocyte % 5.0 %      Atypical Lymphocyte % 1.0 %      Neutrophils Absolute 17.86 10*3/mm3      Lymphocytes Absolute 2.68 10*3/mm3      Monocytes Absolute 0.22 10*3/mm3      nRBC 3.0 /100 WBC      Anisocytosis Slight/1+     Hypochromia Slight/1+     Ovalocytes Slight/1+     Poikilocytes Slight/1+     Polychromasia Slight/1+     Toxic Granulation Slight/1+     Vacuolated Neutrophils Slight/1+     Platelet Morphology Normal    aPTT [[id number]]  (Normal) Collected: 05/01/25 0603    Specimen: Blood Updated: 05/01/25 0634     PTT 23.6 seconds     Protime-INR [[id number]]  (Normal) Collected: 05/01/25 0603    Specimen: Blood Updated: 05/01/25 0634     Protime 13.3 Seconds      INR 1.02             Results for orders placed during the hospital encounter of 12/16/24    Adult Transthoracic Echo Complete W/ Cont if Necessary Per Protocol    Interpretation Summary    Left ventricular ejection fraction appears to be 61 - 65%.    Left ventricular diastolic function is consistent with (grade Ia w/high LAP) impaired relaxation.    The left atrial cavity is dilated.    Estimated right ventricular systolic pressure from tricuspid regurgitation is normal (<35 mmHg).    No significant valvular abnormalities noted.    Extracardiac findings: Periportal lymph nodes are noted.       Condition on Discharge:  Stable    Vital Signs  Temp:  [97.7 °F (36.5 °C)-98.1 °F (36.7 °C)] 97.7 °F (36.5  °C)  Heart Rate:  [59-92] 74  Resp:  [14-23] 16  BP: ()/(44-72) 135/69      Physical Exam  Vitals reviewed.   Constitutional:       Appearance: She is not ill-appearing.   HENT:      Head: Normocephalic and atraumatic.      Right Ear: External ear normal.      Left Ear: External ear normal.      Nose: Nose normal.      Mouth/Throat:      Mouth: Mucous membranes are moist.   Eyes:      General:         Right eye: No discharge.         Left eye: No discharge.   Cardiovascular:      Rate and Rhythm: Normal rate and regular rhythm.      Pulses: Normal pulses.      Heart sounds: Normal heart sounds.   Pulmonary:      Effort: Pulmonary effort is normal.      Breath sounds: Normal breath sounds.   Abdominal:      General: Bowel sounds are normal.      Palpations: Abdomen is soft.   Musculoskeletal:         General: Normal range of motion.      Cervical back: Normal range of motion.   Skin:     General: Skin is warm.   Neurological:      Mental Status: She is alert and oriented to person, place, and time.   Psychiatric:         Behavior: Behavior normal.              Discharge Disposition      Discharge Medications     Discharge Medications        New Medications        Instructions Start Date   hydrocortisone 25 MG suppository  Commonly known as: ANUSOL-HC   25 mg, Rectal, 2 Times Daily      Psyllium 51.7 % packet  Commonly known as: METAMUCIL MULTIHEALTH FIBER   1 packet, Oral, Daily             Continue These Medications        Instructions Start Date   acyclovir 400 MG tablet  Commonly known as: ZOVIRAX   400 mg, Oral, 2 Times Daily, Take no more than 5 doses a day.      allopurinol 300 MG tablet  Commonly known as: ZYLOPRIM   300 mg, Oral, Daily      amLODIPine 5 MG tablet  Commonly known as: NORVASC   Take 1 tablet by mouth Daily.      carvedilol 6.25 MG tablet  Commonly known as: COREG   6.25 mg, Oral, 2 Times Daily      dapsone 100 MG tablet   100 mg, Oral, Daily      fish oil 1000 MG capsule capsule   2,000  mg, Daily With Breakfast      lidocaine-prilocaine 2.5-2.5 % cream  Commonly known as: EMLA   1 Application, Topical, As Needed      lisinopril 20 MG tablet  Commonly known as: PRINIVIL,ZESTRIL   Take 1 tablet by mouth Daily.      ondansetron 8 MG tablet  Commonly known as: ZOFRAN   8 mg, Oral, 3 Times Daily PRN      potassium chloride 10 MEQ CR tablet   10 mEq, Oral, Daily      predniSONE 50 MG tablet  Commonly known as: DELTASONE   100 mg, Oral, Daily, Take with food.  Bring the first dose to chemotherapy appointment.      traZODone 50 MG tablet  Commonly known as: DESYREL   Take 1.5 tablets by mouth Every Night.             Stop These Medications      aspirin 81 MG chewable tablet     Jardiance 10 MG tablet tablet  Generic drug: empagliflozin     pravastatin 20 MG tablet  Commonly known as: PRAVACHOL              Discharge Diet:   Diet Instructions       Diet: Regular/House Diet; Regular (IDDSI 7); Thin (IDDSI 0)      Discharge Diet: Regular/House Diet    Texture: Regular (IDDSI 7)    Fluid Consistency: Thin (IDDSI 0)            Activity at Discharge:   Activity Instructions       Gradually Increase Activity Until at Pre-Hospitalization Level              Follow-up Appointments  Future Appointments   Date Time Provider Department Center   5/5/2025 10:15 AM [facility] PORT CHAIR [facility]   5/5/2025 10:30 AM Can Sethi MD [facility]   5/6/2025  8:15 AM INF ROOM 30 - CHAIR [facility]   5/7/2025  9:15 AM INF ROOM 33B - CHAIR [facility]   5/8/2025  9:15 AM INF ROOM 33B - CHAIR [facility]   5/9/2025 10:00 AM [facility] IR 1 BH [facility] IR [facility]   5/9/2025  2:00 PM [facility] INJECTION CHAIR [facility]     Additional Instructions for the Follow-ups that You Need to Schedule       Discharge Follow-up with PCP   As directed       Currently Documented PCP:    Bozena Alamo APRN    PCP Phone Number:    [phone]     Follow Up Details: 2 weeks                Test Results  Pending at Discharge  Pending Results       Procedure [Order ID] Specimen - Date/Time    Anti-Smooth Muscle Antibody Titer [[id number]] Collected: 05/02/25 0422    Specimen: Blood Updated: 05/02/25 0533    Anti-microsomal Antibody [[id number]] Collected: 05/02/25 0422    Specimen: Blood Updated: 05/02/25 0533    Mitochondrial Antibodies, M2 [[id number]] Collected: 05/02/25 0422    Specimen: Blood Updated: 05/02/25 0533    Tissue Pathology Exam [[id number]] Collected: 05/02/25 0947    Specimen: Polyp from Large Intestine, Left / Descending Colon              NITIN Carolina  05/02/25  11:12 EDT    Time: Discharge 25 min

## 2025-05-02 NOTE — ANESTHESIA POSTPROCEDURE EVALUATION
Patient: Catarina Mclean    Procedure Summary       Date: 05/02/25 Room / Location: [facility] ENDOSCOPY 1 / [facility] ENDOSCOPY    Anesthesia Start: 0924 Anesthesia Stop: 1003    Procedure: COLONOSCOPY with POLYPECTOMY Diagnosis:       Bright red blood per rectum      (Bright red blood per rectum [K62.5])    Surgeons: CARINA Valle MD Provider: Porfirio Hdz MD    Anesthesia Type: MAC ASA Status: 3            Anesthesia Type: MAC    Vitals  Vitals Value Taken Time   /69 05/02/25 10:29   Temp     Pulse 75 05/02/25 10:31   Resp 16 05/02/25 10:21   SpO2 91 % 05/02/25 10:31   Vitals shown include unfiled device data.        Post Anesthesia Care and Evaluation    Patient location during evaluation: PACU  Patient participation: complete - patient participated  Level of consciousness: awake  Pain scale: See nurse's notes for pain score.  Pain management: adequate    Airway patency: patent  Anesthetic complications: No anesthetic complications  PONV Status: none  Cardiovascular status: acceptable  Respiratory status: acceptable and spontaneous ventilation  Hydration status: acceptable    Comments: Patient seen and examined postoperatively; vital signs stable; SpO2 greater than or equal to 90%; cardiopulmonary status stable; nausea/vomiting adequately controlled; pain adequately controlled; no apparent anesthesia complications; patient discharged from anesthesia care when discharge criteria were met

## 2025-05-02 NOTE — PROGRESS NOTES
Hematology/Oncology Inpatient Consultation    Patient name: Catarina Mclean  : 1955  MRN: [id number]  Referring Provider: Dr. Cornell   Reason for Consultation: Established lymphoma patient with anemia and rectal bleeding     Chief complaint: Bleeding per rectum     History of present illness:    Catarina Mclean is a 70 y.o. female who presented to [facility] on 2025 with complaints of rectal bleeding that began earlier this morning.  She started passing bright red blood per rectum with some dark clots.  She denies any history of rectal bleeding prior.  She denies abdominal pain or rectal pain.  She states she has had some constipation has had to do some straining.     25  Hematology/Oncology was consulted.  She is established with Dr. Sethi for lymphoma and is currently undergoing treatment with R-CEOP with intrathecal methotrexate.  Most recently received cycle 5 from 4/15 through .      .: pt seen for initial evaluation during current hospital admission. She denied any more episodes of bleeding per rectum. No other complaints at this time.      INTERVAL HISTORY:  25: Pt seen for follow up visit. Denied any new complaints, no more bleeding reported. She is planned for Colonoscopy later today    He/She  has a past medical history of Drug therapy, Hyperlipidemia, Hypertension, and Non Hodgkin's lymphoma ().    PCP: Bozena Alamo APRN    History:  Past Medical History:   Diagnosis Date    Drug therapy     Hyperlipidemia     Hypertension     Non Hodgkin's lymphoma    ,   Past Surgical History:   Procedure Laterality Date    BREAST BIOPSY      HYSTERECTOMY     ,   Family History   Problem Relation Age of Onset    Liver cancer Brother     Breast cancer Maternal Aunt    ,   Social History     Tobacco Use    Smoking status: Never     Passive exposure: Never    Smokeless tobacco: Never   Vaping Use    Vaping status: Never Used   Substance Use Topics    Alcohol  use: Not Currently    Drug use: Never   ,   Medications Prior to Admission   Medication Sig Dispense Refill Last Dose/Taking    acyclovir (ZOVIRAX) 400 MG tablet Take 1 tablet by mouth 2 (Two) Times a Day. Take no more than 5 doses a day. 60 tablet 5 4/30/2025    allopurinol (ZYLOPRIM) 300 MG tablet TAKE 1 TABLET BY MOUTH DAILY 30 tablet 0 4/30/2025    amLODIPine (NORVASC) 5 MG tablet Take 1 tablet by mouth Daily.   4/30/2025    aspirin 81 MG chewable tablet Chew 1 tablet Daily.   4/30/2025    carvedilol (COREG) 6.25 MG tablet Take 1 tablet by mouth 2 (Two) Times a Day. 180 tablet 3 4/30/2025    dapsone 100 MG tablet Take 1 tablet by mouth Daily. 30 tablet 3 4/30/2025    empagliflozin (Jardiance) 10 MG tablet tablet Take 1 tablet by mouth Daily.   4/30/2025    lisinopril (PRINIVIL,ZESTRIL) 20 MG tablet Take 1 tablet by mouth Daily.   4/30/2025    Omega-3 Fatty Acids (fish oil) 1000 MG capsule capsule Take 2 capsules by mouth Daily With Breakfast.   4/30/2025    potassium chloride 10 MEQ CR tablet Take 1 tablet by mouth Daily for 30 days. 30 tablet 0 4/30/2025    pravastatin (PRAVACHOL) 20 MG tablet Take 1 tablet by mouth Daily.   4/30/2025    predniSONE (DELTASONE) 50 MG tablet TAKE 2 TABLETS BY MOUTH DAILY. TAKE WITH FOOD. BRING THE FIRST DOSE TO CHEMOTHERAPY APPOINTMENT. 10 tablet 5 4/30/2025    traZODone (DESYREL) 50 MG tablet Take 1.5 tablets by mouth Every Night.   4/30/2025    lidocaine-prilocaine (EMLA) 2.5-2.5 % cream Apply 1 Application topically to the appropriate area as directed As Needed (apply 1 hour prior to port access). 30 g 2     ondansetron (ZOFRAN) 8 MG tablet Take 1 tablet by mouth 3 (Three) Times a Day As Needed for Nausea or Vomiting. 30 tablet 5    , Scheduled Meds:  acyclovir, 400 mg, Oral, BID  allopurinol, 300 mg, Oral, Daily  amLODIPine, 5 mg, Oral, Daily  carvedilol, 6.25 mg, Oral, BID  dapsone, 100 mg, Oral, Daily  hydrocortisone, 25 mg, Rectal, BID  lisinopril, 20 mg, Oral,  "Daily  Psyllium, 1 packet, Oral, Daily  traZODone, 75 mg, Oral, Nightly    , Continuous Infusions:   , PRN Meds:    atropine    diphenhydrAMINE    ePHEDrine Sulfate (Pressors)    hydrALAZINE    ipratropium-albuterol    labetalol    lidocaine (cardiac)    ondansetron    [COMPLETED] Insert Peripheral IV **AND** sodium chloride   Allergies:  Sulfamethoxazole-trimethoprim    Subjective     ROS:  Review of Systems   Constitutional:  Positive for fatigue.   HENT: Negative.     Eyes: Negative.    Respiratory: Negative.     Cardiovascular: Negative.    Gastrointestinal:  Positive for blood in stool.   Endocrine: Negative.    Genitourinary: Negative.    Musculoskeletal: Negative.    Skin: Negative.    Allergic/Immunologic: Negative.    Neurological: Negative.    Hematological: Negative.    Psychiatric/Behavioral: Negative.          Objective   Vital Signs:   /69   Pulse 74   Temp 97.7 °F (36.5 °C) (Oral)   Resp 16   Ht 157.5 cm (62\")   Wt 71.4 kg (157 lb 6.5 oz)   SpO2 95%   BMI 28.79 kg/m²     Physical Exam: (performed by MD)  Physical Exam  Constitutional:       Appearance: Normal appearance. She is normal weight.   HENT:      Head: Normocephalic and atraumatic.      Right Ear: External ear normal.      Left Ear: External ear normal.      Nose: Nose normal.      Mouth/Throat:      Mouth: Mucous membranes are moist.      Pharynx: Oropharynx is clear.   Eyes:      Extraocular Movements: Extraocular movements intact.      Conjunctiva/sclera: Conjunctivae normal.      Pupils: Pupils are equal, round, and reactive to light.   Cardiovascular:      Rate and Rhythm: Normal rate.      Pulses: Normal pulses.   Pulmonary:      Effort: Pulmonary effort is normal.   Abdominal:      General: Abdomen is flat.      Palpations: Abdomen is soft.   Musculoskeletal:         General: Normal range of motion.      Cervical back: Normal range of motion and neck supple.   Skin:     General: Skin is warm.   Neurological:      Mental " Status: She is alert.   Psychiatric:         Mood and Affect: Mood normal.         Behavior: Behavior normal.         Thought Content: Thought content normal.         Judgment: Judgment normal.         Results Review:  Lab Results (last 48 hours)       Procedure Component Value Units Date/Time    Tissue Pathology Exam [[id number]] Collected: 05/02/25 0947    Specimen: Polyp from Large Intestine, Left / Descending Colon Updated: 05/02/25 1115    OTTONIEL [[id number]]  (Normal) Collected: 05/02/25 0422    Specimen: Blood Updated: 05/02/25 0938     Antinuclear Antibodies (OTTONIEL) Negative    CBC & Differential [[id number]]  (Abnormal) Collected: 05/02/25 0549    Specimen: Blood Updated: 05/02/25 0655    Narrative:      The following orders were created for panel order CBC & Differential.  Procedure                               Abnormality         Status                     ---------                               -----------         ------                     CBC Auto Differential[[id number]]        Abnormal            Final result               Scan Slide[[id number]]                                       Final result                 Please view results for these tests on the individual orders.    CBC Auto Differential [[id number]]  (Abnormal) Collected: 05/02/25 0549    Specimen: Blood Updated: 05/02/25 0655     WBC 18.53 10*3/mm3      RBC 2.89 10*6/mm3      Hemoglobin 9.5 g/dL      Hematocrit 32.7 %      .1 fL      MCH 32.9 pg      MCHC 29.1 g/dL      RDW 17.8 %      RDW-SD 74.2 fl      MPV 8.6 fL      Platelets 134 10*3/mm3     Narrative:      The previously reported component NRBC is no longer being reported. Previous result was 1.5 /100 WBC (Reference Range: 0.0-0.2 /100 WBC) on 5/2/2025 at 0629 EDT.    Scan Slide [[id number]] Collected: 05/02/25 0549    Specimen: Blood Updated: 05/02/25 0655     Scan Slide --     Comment: See Manual Differential Results       Manual Differential [[id number]]  (Abnormal) Collected:  05/02/25 0549    Specimen: Blood Updated: 05/02/25 0655     Neutrophil % 68.0 %      Lymphocyte % 13.0 %      Monocyte % 1.0 %      Bands %  12.0 %      Metamyelocyte % 2.0 %      Myelocyte % 4.0 %      Neutrophils Absolute 14.82 10*3/mm3      Lymphocytes Absolute 2.41 10*3/mm3      Monocytes Absolute 0.19 10*3/mm3      nRBC 2.0 /100 WBC      Anisocytosis Slight/1+     Macrocytes Slight/1+     Poikilocytes Slight/1+     Polychromasia Slight/1+     WBC Morphology Normal     Platelet Morphology Normal    Basic Metabolic Panel [[id number]]  (Abnormal) Collected: 05/02/25 0422    Specimen: Blood Updated: 05/02/25 0607     Glucose 111 mg/dL      BUN 14 mg/dL      Creatinine 0.72 mg/dL      Sodium 134 mmol/L      Potassium 3.9 mmol/L      Comment: Specimen hemolyzed.  Result may be falsely elevated.        Chloride 100 mmol/L      CO2 21.2 mmol/L      Calcium 8.8 mg/dL      BUN/Creatinine Ratio 19.4     Anion Gap 12.8 mmol/L      eGFR 90.1 mL/min/1.73     Narrative:      GFR Categories in Chronic Kidney Disease (CKD)              GFR Category          GFR (mL/min/1.73)    Interpretation  G1                    90 or greater        Normal or high (1)  G2                    60-89                Mild decrease (1)  G3a                   45-59                Mild to moderate decrease  G3b                   30-44                Moderate to severe decrease  G4                    15-29                Severe decrease  G5                    14 or less           Kidney failure    (1)In the absence of evidence of kidney disease, neither GFR category G1 or G2 fulfill the criteria for CKD.    eGFR calculation 2021 CKD-EPI creatinine equation, which does not include race as a factor    Anti-microsomal Antibody [[id number]] Collected: 05/02/25 0422    Specimen: Blood Updated: 05/02/25 0533    Anti-Smooth Muscle Antibody Titer [[id number]] Collected: 05/02/25 0422    Specimen: Blood Updated: 05/02/25 0533    Mitochondrial Antibodies, M2  [[id number]] Collected: 05/02/25 0422    Specimen: Blood Updated: 05/02/25 0533    Gastrointestinal Panel, PCR - Stool, Per Rectum [[id number]]  (Normal) Collected: 05/01/25 1738    Specimen: Stool from Per Rectum Updated: 05/01/25 2036     Campylobacter Not Detected     Plesiomonas shigelloides Not Detected     Salmonella Not Detected     Vibrio Not Detected     Vibrio cholerae Not Detected     Yersinia enterocolitica Not Detected     Enteroaggregative E. coli (EAEC) Not Detected     Enteropathogenic E. coli (EPEC) Not Detected     Enterotoxigenic E. coli (ETEC) lt/st Not Detected     Shiga-like toxin-producing E. coli (STEC) stx1/stx2 Not Detected     Shigella/Enteroinvasive E. coli (EIEC) Not Detected     Cryptosporidium Not Detected     Cyclospora cayetanensis Not Detected     Entamoeba histolytica Not Detected     Giardia lamblia Not Detected     Adenovirus F40/41 Not Detected     Astrovirus Not Detected     Norovirus GI/GII Not Detected     Rotavirus A Not Detected     Sapovirus (I, II, IV or V) Not Detected    Hepatitis Panel, Acute [[id number]]  (Normal) Collected: 05/01/25 1747    Specimen: Blood Updated: 05/01/25 1818     Hepatitis B Surface Ag Non-Reactive     Hep A IgM Non-Reactive     Hep B C IgM Non-Reactive     Hepatitis C Ab Non-Reactive    Narrative:      Results may be falsely decreased if patient taking Biotin.     Alpha - 1 - Antitrypsin [[id number]]  (Normal) Collected: 05/01/25 0603    Specimen: Blood Updated: 05/01/25 1633     ALPHA -1 ANTITRYPSIN 137 mg/dL     Ceruloplasmin [[id number]]  (Normal) Collected: 05/01/25 0603    Specimen: Blood Updated: 05/01/25 1633     Ceruloplasmin 20 mg/dL     Ferritin [[id number]]  (Abnormal) Collected: 05/01/25 0603    Specimen: Blood Updated: 05/01/25 1340     Ferritin 1,241.00 ng/mL     Narrative:      Results may be falsely decreased if patient taking Biotin.      Iron Profile [[id number]]  (Normal) Collected: 05/01/25 0603    Specimen: Blood Updated: 05/01/25  1340     Iron 106 mcg/dL      Iron Saturation (TSAT) 33 %      Transferrin 213 mg/dL      TIBC 317 mcg/dL     Comprehensive Metabolic Panel [[id number]]  (Abnormal) Collected: 05/01/25 0603    Specimen: Blood Updated: 05/01/25 0649     Glucose 176 mg/dL      BUN 14 mg/dL      Creatinine 0.70 mg/dL      Sodium 140 mmol/L      Potassium 4.4 mmol/L      Comment: Slight hemolysis detected by analyzer. Result may be falsely elevated.        Chloride 106 mmol/L      CO2 20.8 mmol/L      Calcium 9.2 mg/dL      Total Protein 5.5 g/dL      Albumin 4.0 g/dL      ALT (SGPT) 47 U/L      AST (SGOT) 45 U/L      Comment: Slight hemolysis detected by analyzer. Result may be falsely elevated.        Alkaline Phosphatase 116 U/L      Total Bilirubin 1.7 mg/dL      Globulin 1.5 gm/dL      A/G Ratio 2.7 g/dL      BUN/Creatinine Ratio 20.0     Anion Gap 13.2 mmol/L      eGFR 93.2 mL/min/1.73     Narrative:      GFR Categories in Chronic Kidney Disease (CKD)              GFR Category          GFR (mL/min/1.73)    Interpretation  G1                    90 or greater        Normal or high (1)  G2                    60-89                Mild decrease (1)  G3a                   45-59                Mild to moderate decrease  G3b                   30-44                Moderate to severe decrease  G4                    15-29                Severe decrease  G5                    14 or less           Kidney failure    (1)In the absence of evidence of kidney disease, neither GFR category G1 or G2 fulfill the criteria for CKD.    eGFR calculation 2021 CKD-EPI creatinine equation, which does not include race as a factor    CBC & Differential [[id number]]  (Abnormal) Collected: 05/01/25 0603    Specimen: Blood Updated: 05/01/25 0638    Narrative:      The following orders were created for panel order CBC & Differential.  Procedure                               Abnormality         Status                     ---------                                -----------         ------                     CBC Auto Differential[[id number]]        Abnormal            Final result               Scan Slide[[id number]]                                       Final result                 Please view results for these tests on the individual orders.    CBC Auto Differential [[id number]]  (Abnormal) Collected: 05/01/25 0603    Specimen: Blood Updated: 05/01/25 0638     WBC 22.32 10*3/mm3      RBC 3.32 10*6/mm3      Hemoglobin 10.9 g/dL      Hematocrit 36.9 %      .1 fL      MCH 32.8 pg      MCHC 29.5 g/dL      RDW 17.7 %      RDW-SD 72.4 fl      MPV 9.9 fL      Platelets 223 10*3/mm3     Narrative:      The previously reported component NRBC is no longer being reported. Previous result was 1.5 /100 WBC (Reference Range: 0.0-0.2 /100 WBC) on 5/1/2025 at 0615 EDT.    Scan Slide [[id number]] Collected: 05/01/25 0603    Specimen: Blood Updated: 05/01/25 0638     Scan Slide --     Comment: See Manual Differential Results       Manual Differential [[id number]]  (Abnormal) Collected: 05/01/25 0603    Specimen: Blood Updated: 05/01/25 0638     Neutrophil % 55.0 %      Lymphocyte % 11.0 %      Monocyte % 1.0 %      Bands %  25.0 %      Metamyelocyte % 2.0 %      Myelocyte % 5.0 %      Atypical Lymphocyte % 1.0 %      Neutrophils Absolute 17.86 10*3/mm3      Lymphocytes Absolute 2.68 10*3/mm3      Monocytes Absolute 0.22 10*3/mm3      nRBC 3.0 /100 WBC      Anisocytosis Slight/1+     Hypochromia Slight/1+     Ovalocytes Slight/1+     Poikilocytes Slight/1+     Polychromasia Slight/1+     Toxic Granulation Slight/1+     Vacuolated Neutrophils Slight/1+     Platelet Morphology Normal    aPTT [[id number]]  (Normal) Collected: 05/01/25 0603    Specimen: Blood Updated: 05/01/25 0634     PTT 23.6 seconds     Protime-INR [[id number]]  (Normal) Collected: 05/01/25 0603    Specimen: Blood Updated: 05/01/25 0634     Protime 13.3 Seconds      INR 1.02             Pending Results:     Imaging Reviewed:    US Abdomen Limited  Result Date: 5/1/2025  Impression: 1. Mild hepatic steatosis. 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  -interval PET scan results indicate a complete response to the ongoing treatment regimen, with no residual lymphoma detected at this stage.  -patient completed C5 Chemotherapy. Due to uptrending Tbili levels, Vincristine held and Etoposide dose reduced by 50%.  -patient is scheduled for IT Chemotherapy with methotrexate with each cycle.  -Will plan for Cycle 6 as scheduled around 5/10/25,  pending inpatient workup and discharge.     Rectal bleeding:  -She reported bright Red blood per-rectum. No prior history of similar symptoms, But has history of rectal hemorrhoids.  - Gastroenterology evaluation, tentative plan for colonoscopy today.  -pt Denied any new symptoms at this time  -Continue to monitor CBC and transfuse as needed to keep hemoglobin above 7.0 g/dL or as otherwise indicated.     Transaminitis: Likely Secondary to Vincristine and Etoposide.  Dose adjustment with C5 as above  Bilirubin and LFTs have trended down. Monitor  Workup for  transaminitis as per GI team.    Electronically signed by Can Sethi MD, 05/02/25, 12:27 PM EDT.        Thank you for this consult. We will be happy to follow along with you.

## 2025-05-02 NOTE — PLAN OF CARE
Problem: Adult Inpatient Plan of Care  Goal: Plan of Care Review  Flowsheets (Taken 5/2/2025 0251)  Outcome Evaluation: Patient alert and oriented x4.  Bowel prep being completed.  IV fluids in place.  Plan for colonscopy at 0900. Up at [facility].  Goal: Absence of Hospital-Acquired Illness or Injury  Intervention: Identify and Manage Fall Risk  Recent Flowsheet Documentation  Taken 5/2/2025 0200 by Aby Patricia RN  Safety Promotion/Fall Prevention: safety round/check completed  Taken 5/1/2025 2353 by Aby Patricia RN  Safety Promotion/Fall Prevention: safety round/check completed  Taken 5/1/2025 2200 by Aby Patricia RN  Safety Promotion/Fall Prevention: safety round/check completed  Taken 5/1/2025 2034 by Ayb Patricia RN  Safety Promotion/Fall Prevention:   activity supervised   fall prevention program maintained   lighting adjusted   safety round/check completed  Intervention: Prevent Skin Injury  Recent Flowsheet Documentation  Taken 5/1/2025 2353 by Aby Patricia RN  Body Position: position changed independently  Taken 5/1/2025 2034 by Aby Patricia RN  Body Position: position changed independently  Intervention: Prevent and Manage VTE (Venous Thromboembolism) Risk  Recent Flowsheet Documentation  Taken 5/1/2025 2034 by Aby Patricia RN  VTE Prevention/Management:   patient refused intervention   SCDs (sequential compression devices) off  Goal: Optimal Comfort and Wellbeing  Intervention: Provide Person-Centered Care  Recent Flowsheet Documentation  Taken 5/1/2025 2034 by Aby Patricia RN  Trust Relationship/Rapport:   care explained   choices provided  Goal: Plan of Care Review  Recent Flowsheet Documentation  Taken 5/2/2025 0251 by Aby Patricia RN  Outcome Evaluation: Patient alert and oriented x4.  Bowel prep being completed.  IV fluids in place.  Plan for colonscopy at 0900. Up at [facility].  Goal: Absence of Hospital-Acquired Illness or Injury  Intervention: Identify and Manage  Fall Risk  Recent Flowsheet Documentation  Taken 5/2/2025 0200 by Aby Patricia RN  Safety Promotion/Fall Prevention: safety round/check completed  Taken 5/1/2025 2353 by Aby Patricia RN  Safety Promotion/Fall Prevention: safety round/check completed  Taken 5/1/2025 2200 by Aby Patricia RN  Safety Promotion/Fall Prevention: safety round/check completed  Taken 5/1/2025 2034 by Aby Patricia RN  Safety Promotion/Fall Prevention:   activity supervised   fall prevention program maintained   lighting adjusted   safety round/check completed  Intervention: Prevent Skin Injury  Recent Flowsheet Documentation  Taken 5/1/2025 2353 by Aby Patricia RN  Body Position: position changed independently  Taken 5/1/2025 2034 by Aby Patricia RN  Body Position: position changed independently  Intervention: Prevent and Manage VTE (Venous Thromboembolism) Risk  Recent Flowsheet Documentation  Taken 5/1/2025 2034 by Aby Patricia RN  VTE Prevention/Management:   patient refused intervention   SCDs (sequential compression devices) off  Goal: Optimal Comfort and Wellbeing  Intervention: Provide Person-Centered Care  Recent Flowsheet Documentation  Taken 5/1/2025 2034 by Aby Patricia RN  Trust Relationship/Rapport:   care explained   choices provided   Goal Outcome Evaluation:              Outcome Evaluation: Patient alert and oriented x4.  Bowel prep being completed.  IV fluids in place.  Plan for colonscopy at 0900. Up at [facility].

## 2025-05-02 NOTE — OP NOTE
COLONOSCOPY Procedure Report    Patient Name:  Catarina Mclean  YOB: 1955    Date of Surgery:  5/2/2025     Pre-Op Diagnosis:  Bright red blood per rectum [K62.5]       Post-Op Diagnosis Codes:     * Bright red blood per rectum [K62.5]    Post Op Diagnosis:   Colon polyps  Diverticulosis  Internal hemorrhoids    Procedure/CPT® Codes:  No CPT Code Applied in Case Entry    Procedure(s):  COLONOSCOPY with POLYPECTOMY    Staff:  Surgeon(s):  CARINA Valle MD      Anesthesia: Monitored Anesthesia Care    Code Status:  Discussed the code status with patient, and they will remain full code    Description of Procedure:  A description of the procedure as well as risks, benefits and alternative methods were explained to the patient who voiced understanding and signed the corresponding consent form. A physical exam was performed and vital signs were monitored throughout the procedure.    A rectal exam was performed which was normal. An Olympus colonoscope was placed into the rectum and proceeded under direct visualization through the colon until the cecum and appendiceal orifice were identified. Careful visualization occurred upon slow withdraw of the scope. The scope was then retroflexed and the distal rectum was visualized. The quality of the prep was good with irrigation. The procedure was not difficult and there were no immediate complications.  There was no blood loss.    Colonoscopy Findings:    1.  2 colon polyps in the descending and sigmoid colon which appeared benign, sessile, 3-4 mm in diameter, both removed in a single piece with cold snare polypectomy and completely removed.  2.  Moderate diverticulosis with several small openings in the descending and sigmoid colon with no bleeding.  These were irrigated extensively with no sign of bleeding  3.  Medium size nonbleeding internal hemorrhoids.  Possible stigmata of recent bleeding.  4.  Normal mucosa in the terminal ileum.  Scope advanced at  least 20 cm beyond the IC valve.  No blood was seen in the ileum.  5.  Otherwise normal-appearing mucosa throughout the colon.  No blood was seen throughout the whole colon.  Yellow liquid stool which was irrigated as much as possible.     Recommendations:   -Consider recall for colonoscopy in 7 years for adenomatous colon polyps, 5 years if sessile serrated adenoma.    - Bleeding may be due to hemorrhoids in the setting of thrombocytopenia. Nothing else seen to explain her anemia and rectal bleeding  - Will treat hemorrhoids with hydrocortisone suppository  - Recommend fiber supplement or laxative daily to prevent constipation  - Okay to resume regular diet    GI will see inpatient as needed. Will sign off, but please feel free to call us back if further assistance is needed.  Thank you        JOHANNE Valle MD     Date: 5/2/2025    Time: 10:14 EDT

## 2025-05-02 NOTE — ANESTHESIA PREPROCEDURE EVALUATION
Anesthesia Evaluation     Patient summary reviewed and Nursing notes reviewed   NPO Solid Status: > 8 hours  NPO Liquid Status: > 8 hours           Airway   Mallampati: II  TM distance: >3 FB  Neck ROM: full  No difficulty expected  Dental - normal exam     Pulmonary    Cardiovascular     (+) hypertension, hyperlipidemia      Neuro/Psych  (+) weakness  GI/Hepatic/Renal/Endo    (+) GI bleeding     Musculoskeletal     Abdominal    Substance History      OB/GYN          Other   blood dyscrasia anemia,   history of cancer                Anesthesia Plan    ASA 3     MAC     intravenous induction     Anesthetic plan, risks, benefits, and alternatives have been provided, discussed and informed consent has been obtained with: patient.    Plan discussed with CRNA.    CODE STATUS:    Code Status (Patient has no pulse and is not breathing): CPR (Attempt to Resuscitate)  Medical Interventions (Patient has pulse or is breathing): Full Support

## 2025-05-03 LAB
MITOCHONDRIA M2 IGG SER-ACNC: <20 UNITS (ref 0–20)
SMA IGG SER-ACNC: 2 UNITS (ref 0–19)

## 2025-05-05 ENCOUNTER — HOSPITAL ENCOUNTER (OUTPATIENT)
Dept: ONCOLOGY | Facility: HOSPITAL | Age: 70
Discharge: HOME | End: 2025-05-05
Admitting: STUDENT IN AN ORGANIZED HEALTH CARE EDUCATION/TRAINING PROGRAM
Payer: MEDICARE

## 2025-05-05 ENCOUNTER — OFFICE VISIT (OUTPATIENT)
Dept: ONCOLOGY | Facility: CLINIC | Age: 70
End: 2025-05-05
Payer: MEDICARE

## 2025-05-05 VITALS
DIASTOLIC BLOOD PRESSURE: 87 MMHG | HEIGHT: 62 IN | BODY MASS INDEX: 28.74 KG/M2 | SYSTOLIC BLOOD PRESSURE: 156 MMHG | HEART RATE: 73 BPM | WEIGHT: 156.2 LBS | OXYGEN SATURATION: 92 %

## 2025-05-05 DIAGNOSIS — C85.80 LYMPHOMA MALIGNANT, LARGE CELL: Primary | ICD-10-CM

## 2025-05-05 DIAGNOSIS — C83.31 DIFFUSE LARGE B-CELL LYMPHOMA, LYMPH NODES OF HEAD, FACE, AND NECK: ICD-10-CM

## 2025-05-05 DIAGNOSIS — C85.99 LYMPHOMA OF EXTRANODAL AND SOLID ORGAN SITES: ICD-10-CM

## 2025-05-05 DIAGNOSIS — E80.6 HYPERBILIRUBINEMIA: ICD-10-CM

## 2025-05-05 DIAGNOSIS — Z45.2 ENCOUNTER FOR CARE RELATED TO VASCULAR ACCESS PORT: ICD-10-CM

## 2025-05-05 DIAGNOSIS — Z79.899 ENCOUNTER FOR MONITORING CARDIOTOXIC DRUG THERAPY: ICD-10-CM

## 2025-05-05 DIAGNOSIS — Z51.11 ENCOUNTER FOR ANTINEOPLASTIC CHEMOTHERAPY: ICD-10-CM

## 2025-05-05 DIAGNOSIS — Z51.81 ENCOUNTER FOR MONITORING CARDIOTOXIC DRUG THERAPY: ICD-10-CM

## 2025-05-05 DIAGNOSIS — R53.1 WEAKNESS: ICD-10-CM

## 2025-05-05 DIAGNOSIS — D64.9 ANEMIA, UNSPECIFIED TYPE: ICD-10-CM

## 2025-05-05 LAB
ALBUMIN SERPL-MCNC: 4 G/DL (ref 3.5–5.2)
ALBUMIN/GLOB SERPL: 3.1 G/DL
ALP SERPL-CCNC: 82 U/L (ref 39–117)
ALT SERPL W P-5'-P-CCNC: 46 U/L (ref 1–33)
ANION GAP SERPL CALCULATED.3IONS-SCNC: 9.1 MMOL/L (ref 5–15)
AST SERPL-CCNC: 26 U/L (ref 1–32)
BASOPHILS # BLD AUTO: 0.03 10*3/MM3 (ref 0–0.2)
BASOPHILS NFR BLD AUTO: 0.2 % (ref 0–1.5)
BILIRUB SERPL-MCNC: 2.5 MG/DL (ref 0–1.2)
BUN SERPL-MCNC: 12 MG/DL (ref 8–23)
BUN/CREAT SERPL: 22.2 (ref 7–25)
CALCIUM SPEC-SCNC: 9.5 MG/DL (ref 8.6–10.5)
CHLORIDE SERPL-SCNC: 104 MMOL/L (ref 98–107)
CO2 SERPL-SCNC: 25.9 MMOL/L (ref 22–29)
CREAT SERPL-MCNC: 0.54 MG/DL (ref 0.57–1)
DEPRECATED RDW RBC AUTO: 68.9 FL (ref 37–54)
EGFRCR SERPLBLD CKD-EPI 2021: 99.2 ML/MIN/1.73
EOSINOPHIL # BLD AUTO: 0 10*3/MM3 (ref 0–0.4)
EOSINOPHIL NFR BLD AUTO: 0 % (ref 0.3–6.2)
ERYTHROCYTE [DISTWIDTH] IN BLOOD BY AUTOMATED COUNT: 17.7 % (ref 12.3–15.4)
GLOBULIN UR ELPH-MCNC: 1.3 GM/DL
GLUCOSE SERPL-MCNC: 125 MG/DL (ref 65–99)
HCT VFR BLD AUTO: 30.4 % (ref 34–46.6)
HGB BLD-MCNC: 9.4 G/DL (ref 12–15.9)
LAB AP CASE REPORT: NORMAL
LDH SERPL-CCNC: 620 U/L (ref 135–214)
LKM-1 AB SER-ACNC: <1 UNITS (ref 0–20)
LYMPHOCYTES # BLD AUTO: 4.64 10*3/MM3 (ref 0.7–3.1)
LYMPHOCYTES NFR BLD AUTO: 24 % (ref 19.6–45.3)
MAGNESIUM SERPL-MCNC: 2.1 MG/DL (ref 1.6–2.4)
MCH RBC QN AUTO: 33.8 PG (ref 26.6–33)
MCHC RBC AUTO-ENTMCNC: 30.9 G/DL (ref 31.5–35.7)
MCV RBC AUTO: 109.4 FL (ref 79–97)
MONOCYTES # BLD AUTO: 1.08 10*3/MM3 (ref 0.1–0.9)
MONOCYTES NFR BLD AUTO: 5.6 % (ref 5–12)
NEUTROPHILS NFR BLD AUTO: 13.56 10*3/MM3 (ref 1.7–7)
NEUTROPHILS NFR BLD AUTO: 70.2 % (ref 42.7–76)
PATH REPORT.FINAL DX SPEC: NORMAL
PATH REPORT.GROSS SPEC: NORMAL
PHOSPHATE SERPL-MCNC: 3.1 MG/DL (ref 2.5–4.5)
PLATELET # BLD AUTO: 136 10*3/MM3 (ref 140–450)
PMV BLD AUTO: 9 FL (ref 6–12)
POTASSIUM SERPL-SCNC: 3.7 MMOL/L (ref 3.5–5.2)
PROT SERPL-MCNC: 5.3 G/DL (ref 6–8.5)
RBC # BLD AUTO: 2.78 10*6/MM3 (ref 3.77–5.28)
SODIUM SERPL-SCNC: 139 MMOL/L (ref 136–145)
WBC NRBC COR # BLD AUTO: 19.31 10*3/MM3 (ref 3.4–10.8)

## 2025-05-05 PROCEDURE — 85025 COMPLETE CBC W/AUTO DIFF WBC: CPT | Performed by: STUDENT IN AN ORGANIZED HEALTH CARE EDUCATION/TRAINING PROGRAM

## 2025-05-05 PROCEDURE — 36591 DRAW BLOOD OFF VENOUS DEVICE: CPT

## 2025-05-05 PROCEDURE — 1126F AMNT PAIN NOTED NONE PRSNT: CPT | Performed by: STUDENT IN AN ORGANIZED HEALTH CARE EDUCATION/TRAINING PROGRAM

## 2025-05-05 PROCEDURE — 80053 COMPREHEN METABOLIC PANEL: CPT | Performed by: STUDENT IN AN ORGANIZED HEALTH CARE EDUCATION/TRAINING PROGRAM

## 2025-05-05 PROCEDURE — 83735 ASSAY OF MAGNESIUM: CPT | Performed by: STUDENT IN AN ORGANIZED HEALTH CARE EDUCATION/TRAINING PROGRAM

## 2025-05-05 PROCEDURE — 3079F DIAST BP 80-89 MM HG: CPT | Performed by: STUDENT IN AN ORGANIZED HEALTH CARE EDUCATION/TRAINING PROGRAM

## 2025-05-05 PROCEDURE — 3077F SYST BP >= 140 MM HG: CPT | Performed by: STUDENT IN AN ORGANIZED HEALTH CARE EDUCATION/TRAINING PROGRAM

## 2025-05-05 PROCEDURE — 83615 LACTATE (LD) (LDH) ENZYME: CPT | Performed by: STUDENT IN AN ORGANIZED HEALTH CARE EDUCATION/TRAINING PROGRAM

## 2025-05-05 PROCEDURE — 99214 OFFICE O/P EST MOD 30 MIN: CPT | Performed by: STUDENT IN AN ORGANIZED HEALTH CARE EDUCATION/TRAINING PROGRAM

## 2025-05-05 PROCEDURE — 25010000002 HEPARIN LOCK FLUSH PER 10 UNITS: Performed by: STUDENT IN AN ORGANIZED HEALTH CARE EDUCATION/TRAINING PROGRAM

## 2025-05-05 PROCEDURE — 84100 ASSAY OF PHOSPHORUS: CPT | Performed by: STUDENT IN AN ORGANIZED HEALTH CARE EDUCATION/TRAINING PROGRAM

## 2025-05-05 RX ORDER — SODIUM CHLORIDE 0.9 % (FLUSH) 0.9 %
20 SYRINGE (ML) INJECTION AS NEEDED
Status: CANCELLED | OUTPATIENT
Start: 2025-05-05

## 2025-05-05 RX ORDER — MEPERIDINE HYDROCHLORIDE 25 MG/ML
25 INJECTION INTRAMUSCULAR; INTRAVENOUS; SUBCUTANEOUS
Status: CANCELLED | OUTPATIENT
Start: 2025-05-06

## 2025-05-05 RX ORDER — SODIUM CHLORIDE 0.9 % (FLUSH) 0.9 %
20 SYRINGE (ML) INJECTION AS NEEDED
Status: DISCONTINUED | OUTPATIENT
Start: 2025-05-05 | End: 2025-05-06 | Stop reason: HOSPADM

## 2025-05-05 RX ORDER — ACETAMINOPHEN 325 MG/1
650 TABLET ORAL ONCE
Status: CANCELLED | OUTPATIENT
Start: 2025-05-06

## 2025-05-05 RX ORDER — HEPARIN SODIUM (PORCINE) LOCK FLUSH IV SOLN 100 UNIT/ML 100 UNIT/ML
500 SOLUTION INTRAVENOUS AS NEEDED
Status: DISCONTINUED | OUTPATIENT
Start: 2025-05-05 | End: 2025-05-06 | Stop reason: HOSPADM

## 2025-05-05 RX ORDER — SODIUM CHLORIDE 9 MG/ML
20 INJECTION, SOLUTION INTRAVENOUS ONCE
Status: CANCELLED | OUTPATIENT
Start: 2025-05-08

## 2025-05-05 RX ORDER — HEPARIN SODIUM (PORCINE) LOCK FLUSH IV SOLN 100 UNIT/ML 100 UNIT/ML
500 SOLUTION INTRAVENOUS AS NEEDED
Status: CANCELLED | OUTPATIENT
Start: 2025-05-05

## 2025-05-05 RX ORDER — FAMOTIDINE 10 MG/ML
20 INJECTION, SOLUTION INTRAVENOUS AS NEEDED
Status: CANCELLED | OUTPATIENT
Start: 2025-05-06

## 2025-05-05 RX ORDER — DIPHENHYDRAMINE HYDROCHLORIDE 50 MG/ML
50 INJECTION, SOLUTION INTRAMUSCULAR; INTRAVENOUS AS NEEDED
Status: CANCELLED | OUTPATIENT
Start: 2025-05-06

## 2025-05-05 RX ORDER — PALONOSETRON 0.05 MG/ML
0.25 INJECTION, SOLUTION INTRAVENOUS ONCE
Status: CANCELLED | OUTPATIENT
Start: 2025-05-06

## 2025-05-05 RX ORDER — SODIUM CHLORIDE 9 MG/ML
20 INJECTION, SOLUTION INTRAVENOUS ONCE
Status: CANCELLED | OUTPATIENT
Start: 2025-05-06

## 2025-05-05 RX ORDER — HYDROCORTISONE SODIUM SUCCINATE 100 MG/2ML
100 INJECTION INTRAMUSCULAR; INTRAVENOUS AS NEEDED
Status: CANCELLED | OUTPATIENT
Start: 2025-05-06

## 2025-05-05 RX ORDER — SODIUM CHLORIDE 9 MG/ML
20 INJECTION, SOLUTION INTRAVENOUS ONCE
Status: CANCELLED | OUTPATIENT
Start: 2025-05-07

## 2025-05-05 RX ADMIN — HEPARIN 500 UNITS: 100 SYRINGE at 10:04

## 2025-05-05 RX ADMIN — Medication 10 ML: at 10:04

## 2025-05-05 NOTE — PROGRESS NOTES
Port accessed and flushed with good blood return noted. 10cc of blood wasted prior to specimen collection. Blood specimen obtained and sent to lab for processing per protocol.  Port flushed with saline and heparin prior to needle removal.  Patient sent to [facility] to await MD visit.

## 2025-05-05 NOTE — CASE MANAGEMENT/SOCIAL WORK
Case Management Discharge Note      Final Note: Home    Provided Post Acute Provider List?: N/A  Provided Post Acute Provider Quality & Resource List?: N/A    Selected Continued Care - Discharged on 5/2/2025 Admission date: 5/1/2025 - Discharge disposition: Home or Self Care         Transportation Services  Private: Car    Final Discharge Disposition Code: 01 - home or self-care

## 2025-05-06 ENCOUNTER — HOSPITAL ENCOUNTER (OUTPATIENT)
Dept: ONCOLOGY | Facility: HOSPITAL | Age: 70
Discharge: HOME | End: 2025-05-06
Admitting: STUDENT IN AN ORGANIZED HEALTH CARE EDUCATION/TRAINING PROGRAM
Payer: MEDICARE

## 2025-05-06 VITALS
TEMPERATURE: 97.9 F | BODY MASS INDEX: 28.71 KG/M2 | RESPIRATION RATE: 14 BRPM | DIASTOLIC BLOOD PRESSURE: 76 MMHG | HEART RATE: 64 BPM | OXYGEN SATURATION: 92 % | HEIGHT: 62 IN | SYSTOLIC BLOOD PRESSURE: 120 MMHG | WEIGHT: 156 LBS

## 2025-05-06 DIAGNOSIS — Z45.2 ENCOUNTER FOR CARE RELATED TO VASCULAR ACCESS PORT: Primary | ICD-10-CM

## 2025-05-06 DIAGNOSIS — C85.80 LYMPHOMA MALIGNANT, LARGE CELL: ICD-10-CM

## 2025-05-06 PROCEDURE — 96375 TX/PRO/DX INJ NEW DRUG ADDON: CPT

## 2025-05-06 PROCEDURE — 25010000002 RITUXIMAB 10 MG/ML SOLUTION 50 ML VIAL: Performed by: STUDENT IN AN ORGANIZED HEALTH CARE EDUCATION/TRAINING PROGRAM

## 2025-05-06 PROCEDURE — 25010000002 RITUXIMAB 10 MG/ML SOLUTION 10 ML VIAL: Performed by: STUDENT IN AN ORGANIZED HEALTH CARE EDUCATION/TRAINING PROGRAM

## 2025-05-06 PROCEDURE — 25810000003 SODIUM CHLORIDE 0.9 % SOLUTION 250 ML FLEX CONT: Performed by: STUDENT IN AN ORGANIZED HEALTH CARE EDUCATION/TRAINING PROGRAM

## 2025-05-06 PROCEDURE — A9270 NON-COVERED ITEM OR SERVICE: HCPCS | Performed by: STUDENT IN AN ORGANIZED HEALTH CARE EDUCATION/TRAINING PROGRAM

## 2025-05-06 PROCEDURE — 25010000002 CYCLOPHOSPHAMIDE 2 GM/10ML SOLUTION 10 ML VIAL: Performed by: STUDENT IN AN ORGANIZED HEALTH CARE EDUCATION/TRAINING PROGRAM

## 2025-05-06 PROCEDURE — 96413 CHEMO IV INFUSION 1 HR: CPT

## 2025-05-06 PROCEDURE — 96411 CHEMO IV PUSH ADDL DRUG: CPT

## 2025-05-06 PROCEDURE — 63710000001 ACETAMINOPHEN 325 MG TABLET: Performed by: STUDENT IN AN ORGANIZED HEALTH CARE EDUCATION/TRAINING PROGRAM

## 2025-05-06 PROCEDURE — 25010000002 HEPARIN LOCK FLUSH PER 10 UNITS: Performed by: STUDENT IN AN ORGANIZED HEALTH CARE EDUCATION/TRAINING PROGRAM

## 2025-05-06 PROCEDURE — 96415 CHEMO IV INFUSION ADDL HR: CPT

## 2025-05-06 PROCEDURE — 25010000002 VINCRISTINE PER 1 MG: Performed by: STUDENT IN AN ORGANIZED HEALTH CARE EDUCATION/TRAINING PROGRAM

## 2025-05-06 PROCEDURE — 96417 CHEMO IV INFUS EACH ADDL SEQ: CPT

## 2025-05-06 PROCEDURE — 25010000002 DIPHENHYDRAMINE PER 50 MG: Performed by: STUDENT IN AN ORGANIZED HEALTH CARE EDUCATION/TRAINING PROGRAM

## 2025-05-06 PROCEDURE — 25010000002 ETOPOSIDE 1 GM/50ML SOLUTION 50 ML VIAL: Performed by: STUDENT IN AN ORGANIZED HEALTH CARE EDUCATION/TRAINING PROGRAM

## 2025-05-06 PROCEDURE — 25810000003 SODIUM CHLORIDE 0.9 % SOLUTION: Performed by: STUDENT IN AN ORGANIZED HEALTH CARE EDUCATION/TRAINING PROGRAM

## 2025-05-06 PROCEDURE — 25010000002 PALONOSETRON PER 25 MCG: Performed by: STUDENT IN AN ORGANIZED HEALTH CARE EDUCATION/TRAINING PROGRAM

## 2025-05-06 RX ORDER — HEPARIN SODIUM (PORCINE) LOCK FLUSH IV SOLN 100 UNIT/ML 100 UNIT/ML
500 SOLUTION INTRAVENOUS AS NEEDED
Status: DISCONTINUED | OUTPATIENT
Start: 2025-05-06 | End: 2025-05-07 | Stop reason: HOSPADM

## 2025-05-06 RX ORDER — DIPHENHYDRAMINE HYDROCHLORIDE 50 MG/ML
50 INJECTION, SOLUTION INTRAMUSCULAR; INTRAVENOUS ONCE
Status: COMPLETED | OUTPATIENT
Start: 2025-05-06 | End: 2025-05-06

## 2025-05-06 RX ORDER — SODIUM CHLORIDE 0.9 % (FLUSH) 0.9 %
20 SYRINGE (ML) INJECTION AS NEEDED
Status: DISCONTINUED | OUTPATIENT
Start: 2025-05-06 | End: 2025-05-07 | Stop reason: HOSPADM

## 2025-05-06 RX ORDER — PALONOSETRON 0.05 MG/ML
0.25 INJECTION, SOLUTION INTRAVENOUS ONCE
Status: COMPLETED | OUTPATIENT
Start: 2025-05-06 | End: 2025-05-06

## 2025-05-06 RX ORDER — ACETAMINOPHEN 325 MG/1
650 TABLET ORAL ONCE
Status: COMPLETED | OUTPATIENT
Start: 2025-05-06 | End: 2025-05-06

## 2025-05-06 RX ORDER — SODIUM CHLORIDE 9 MG/ML
20 INJECTION, SOLUTION INTRAVENOUS ONCE
Status: COMPLETED | OUTPATIENT
Start: 2025-05-06 | End: 2025-05-06

## 2025-05-06 RX ORDER — HEPARIN SODIUM (PORCINE) LOCK FLUSH IV SOLN 100 UNIT/ML 100 UNIT/ML
500 SOLUTION INTRAVENOUS AS NEEDED
Status: CANCELLED | OUTPATIENT
Start: 2025-05-06

## 2025-05-06 RX ORDER — SODIUM CHLORIDE 0.9 % (FLUSH) 0.9 %
20 SYRINGE (ML) INJECTION AS NEEDED
Status: CANCELLED | OUTPATIENT
Start: 2025-05-06

## 2025-05-06 RX ADMIN — PALONOSETRON 0.25 MG: 0.05 INJECTION, SOLUTION INTRAVENOUS at 08:49

## 2025-05-06 RX ADMIN — SODIUM CHLORIDE 40 MG: 9 INJECTION, SOLUTION INTRAVENOUS at 11:46

## 2025-05-06 RX ADMIN — RITUXIMAB 650 MG: 10 INJECTION, SOLUTION INTRAVENOUS at 09:11

## 2025-05-06 RX ADMIN — HEPARIN 500 UNITS: 100 SYRINGE at 13:07

## 2025-05-06 RX ADMIN — DIPHENHYDRAMINE HYDROCHLORIDE 50 MG: 50 INJECTION INTRAMUSCULAR; INTRAVENOUS at 08:51

## 2025-05-06 RX ADMIN — VINCRISTINE SULFATE 1 MG: 1 INJECTION, SOLUTION INTRAVENOUS at 12:49

## 2025-05-06 RX ADMIN — SODIUM CHLORIDE 20 ML/HR: 9 INJECTION, SOLUTION INTRAVENOUS at 08:49

## 2025-05-06 RX ADMIN — ACETAMINOPHEN 650 MG: 325 TABLET ORAL at 08:50

## 2025-05-06 RX ADMIN — CYCLOPHOSPHAMIDE 1290 MG: 2 INJECTION INTRAVENOUS at 11:06

## 2025-05-06 RX ADMIN — Medication 20 ML: at 13:07

## 2025-05-06 NOTE — PROGRESS NOTES
Patient to infusion for C6 R-CEOP, she saw Dr Sethi yesterday. VSS, no new complaints. Labs from yesterday noted, dose adjusted for etopside and vincristine for elevated bilirubin 2.5.     Tolerated treatment well. Printed out AVS and discharged home.

## 2025-05-07 ENCOUNTER — HOSPITAL ENCOUNTER (OUTPATIENT)
Dept: ONCOLOGY | Facility: HOSPITAL | Age: 70
Discharge: HOME | End: 2025-05-07
Admitting: STUDENT IN AN ORGANIZED HEALTH CARE EDUCATION/TRAINING PROGRAM
Payer: MEDICARE

## 2025-05-07 VITALS
RESPIRATION RATE: 16 BRPM | TEMPERATURE: 97.8 F | HEART RATE: 112 BPM | HEIGHT: 62 IN | WEIGHT: 157.8 LBS | BODY MASS INDEX: 29.04 KG/M2 | DIASTOLIC BLOOD PRESSURE: 80 MMHG | SYSTOLIC BLOOD PRESSURE: 132 MMHG | OXYGEN SATURATION: 87 %

## 2025-05-07 DIAGNOSIS — C85.80 LYMPHOMA MALIGNANT, LARGE CELL: Primary | ICD-10-CM

## 2025-05-07 DIAGNOSIS — Z45.2 ENCOUNTER FOR CARE RELATED TO VASCULAR ACCESS PORT: ICD-10-CM

## 2025-05-07 PROCEDURE — 25010000002 HEPARIN LOCK FLUSH PER 10 UNITS: Performed by: STUDENT IN AN ORGANIZED HEALTH CARE EDUCATION/TRAINING PROGRAM

## 2025-05-07 PROCEDURE — 96413 CHEMO IV INFUSION 1 HR: CPT

## 2025-05-07 PROCEDURE — 25010000002 ETOPOSIDE 1 GM/50ML SOLUTION 50 ML VIAL: Performed by: STUDENT IN AN ORGANIZED HEALTH CARE EDUCATION/TRAINING PROGRAM

## 2025-05-07 RX ORDER — SODIUM CHLORIDE 9 MG/ML
20 INJECTION, SOLUTION INTRAVENOUS ONCE
Status: DISCONTINUED | OUTPATIENT
Start: 2025-05-07 | End: 2025-05-08 | Stop reason: HOSPADM

## 2025-05-07 RX ORDER — HEPARIN SODIUM (PORCINE) LOCK FLUSH IV SOLN 100 UNIT/ML 100 UNIT/ML
500 SOLUTION INTRAVENOUS AS NEEDED
Status: DISCONTINUED | OUTPATIENT
Start: 2025-05-07 | End: 2025-05-08 | Stop reason: HOSPADM

## 2025-05-07 RX ORDER — SODIUM CHLORIDE 0.9 % (FLUSH) 0.9 %
20 SYRINGE (ML) INJECTION AS NEEDED
Status: CANCELLED | OUTPATIENT
Start: 2025-05-07

## 2025-05-07 RX ORDER — HEPARIN SODIUM (PORCINE) LOCK FLUSH IV SOLN 100 UNIT/ML 100 UNIT/ML
500 SOLUTION INTRAVENOUS AS NEEDED
Status: CANCELLED | OUTPATIENT
Start: 2025-05-07

## 2025-05-07 RX ORDER — SODIUM CHLORIDE 0.9 % (FLUSH) 0.9 %
20 SYRINGE (ML) INJECTION AS NEEDED
Status: DISCONTINUED | OUTPATIENT
Start: 2025-05-07 | End: 2025-05-08 | Stop reason: HOSPADM

## 2025-05-07 RX ADMIN — SODIUM CHLORIDE 40 MG: 9 INJECTION, SOLUTION INTRAVENOUS at 09:36

## 2025-05-07 RX ADMIN — Medication 20 ML: at 10:37

## 2025-05-07 RX ADMIN — HEPARIN 500 UNITS: 100 SYRINGE at 10:37

## 2025-05-07 NOTE — PROGRESS NOTES
Pt here for C6D2 ETOP. Port accessed and flushed with good blood return noted. 10cc of blood wasted prior to specimen collection. Blood specimen obtained and sent to lab for processing per protocol. Pt denies any new complaints or concerns since tx yesterday. Tx given per MAR and tolerated well. Port flushed with saline and heparin prior to needle removal. Pt declined AVS and d/c by self.

## 2025-05-08 ENCOUNTER — HOSPITAL ENCOUNTER (OUTPATIENT)
Dept: ONCOLOGY | Facility: HOSPITAL | Age: 70
Discharge: HOME | End: 2025-05-08
Admitting: STUDENT IN AN ORGANIZED HEALTH CARE EDUCATION/TRAINING PROGRAM
Payer: MEDICARE

## 2025-05-08 VITALS
OXYGEN SATURATION: 94 % | DIASTOLIC BLOOD PRESSURE: 72 MMHG | HEART RATE: 120 BPM | WEIGHT: 157 LBS | RESPIRATION RATE: 16 BRPM | TEMPERATURE: 97.8 F | SYSTOLIC BLOOD PRESSURE: 113 MMHG | BODY MASS INDEX: 28.89 KG/M2 | HEIGHT: 62 IN

## 2025-05-08 DIAGNOSIS — C85.80 LYMPHOMA MALIGNANT, LARGE CELL: Primary | ICD-10-CM

## 2025-05-08 DIAGNOSIS — C85.80 LYMPHOMA MALIGNANT, LARGE CELL: ICD-10-CM

## 2025-05-08 DIAGNOSIS — Z45.2 ENCOUNTER FOR CARE RELATED TO VASCULAR ACCESS PORT: ICD-10-CM

## 2025-05-08 PROCEDURE — 96413 CHEMO IV INFUSION 1 HR: CPT

## 2025-05-08 PROCEDURE — 25010000002 HEPARIN LOCK FLUSH PER 10 UNITS: Performed by: STUDENT IN AN ORGANIZED HEALTH CARE EDUCATION/TRAINING PROGRAM

## 2025-05-08 PROCEDURE — 25010000002 ETOPOSIDE 1 GM/50ML SOLUTION 50 ML VIAL: Performed by: STUDENT IN AN ORGANIZED HEALTH CARE EDUCATION/TRAINING PROGRAM

## 2025-05-08 RX ORDER — SODIUM CHLORIDE 0.9 % (FLUSH) 0.9 %
20 SYRINGE (ML) INJECTION AS NEEDED
Status: CANCELLED | OUTPATIENT
Start: 2025-05-08

## 2025-05-08 RX ORDER — HEPARIN SODIUM (PORCINE) LOCK FLUSH IV SOLN 100 UNIT/ML 100 UNIT/ML
500 SOLUTION INTRAVENOUS AS NEEDED
Status: CANCELLED | OUTPATIENT
Start: 2025-05-08

## 2025-05-08 RX ORDER — PREDNISONE 50 MG/1
100 TABLET ORAL DAILY
Qty: 10 TABLET | Refills: 5 | OUTPATIENT
Start: 2025-05-08

## 2025-05-08 RX ORDER — PREDNISONE 50 MG/1
100 TABLET ORAL DAILY
COMMUNITY

## 2025-05-08 RX ORDER — SODIUM CHLORIDE 0.9 % (FLUSH) 0.9 %
20 SYRINGE (ML) INJECTION AS NEEDED
Status: DISCONTINUED | OUTPATIENT
Start: 2025-05-08 | End: 2025-05-09 | Stop reason: HOSPADM

## 2025-05-08 RX ORDER — SODIUM CHLORIDE 9 MG/ML
20 INJECTION, SOLUTION INTRAVENOUS ONCE
Status: DISCONTINUED | OUTPATIENT
Start: 2025-05-08 | End: 2025-05-09 | Stop reason: HOSPADM

## 2025-05-08 RX ORDER — HEPARIN SODIUM (PORCINE) LOCK FLUSH IV SOLN 100 UNIT/ML 100 UNIT/ML
500 SOLUTION INTRAVENOUS AS NEEDED
Status: DISCONTINUED | OUTPATIENT
Start: 2025-05-08 | End: 2025-05-09 | Stop reason: HOSPADM

## 2025-05-08 RX ADMIN — HEPARIN 500 UNITS: 100 SYRINGE at 10:35

## 2025-05-08 RX ADMIN — Medication 20 ML: at 10:35

## 2025-05-08 RX ADMIN — SODIUM CHLORIDE 40 MG: 9 INJECTION, SOLUTION INTRAVENOUS at 09:36

## 2025-05-08 NOTE — PROGRESS NOTES
Pt here for C6 D3 etoposide. Using sterile technique, port accessed for treatment. Port flushed easily and positive blood return noted. Treatment given and pt tolerated it well. Copy of AVS given and she verbalized understanding.

## 2025-05-09 ENCOUNTER — HOSPITAL ENCOUNTER (OUTPATIENT)
Dept: INTERVENTIONAL RADIOLOGY/VASCULAR | Facility: HOSPITAL | Age: 70
Discharge: HOME | End: 2025-05-09
Payer: MEDICARE

## 2025-05-09 ENCOUNTER — HOSPITAL ENCOUNTER (OUTPATIENT)
Dept: ONCOLOGY | Facility: HOSPITAL | Age: 70
Discharge: HOME | End: 2025-05-09
Payer: MEDICARE

## 2025-05-09 VITALS
HEART RATE: 84 BPM | DIASTOLIC BLOOD PRESSURE: 59 MMHG | SYSTOLIC BLOOD PRESSURE: 166 MMHG | RESPIRATION RATE: 12 BRPM | OXYGEN SATURATION: 94 %

## 2025-05-09 DIAGNOSIS — C85.80 LYMPHOMA MALIGNANT, LARGE CELL: Primary | ICD-10-CM

## 2025-05-09 PROCEDURE — 25010000002 HEPARIN LOCK FLUSH PER 10 UNITS: Performed by: RADIOLOGY

## 2025-05-09 PROCEDURE — 25010000002 PEGFILGRASTIM-FPGK 6 MG/0.6ML SOLUTION PREFILLED SYRINGE: Performed by: STUDENT IN AN ORGANIZED HEALTH CARE EDUCATION/TRAINING PROGRAM

## 2025-05-09 PROCEDURE — 77003 FLUOROGUIDE FOR SPINE INJECT: CPT

## 2025-05-09 PROCEDURE — 25010000002 METHOTREXATE PF 100 MG/4ML SOLUTION 2 ML VIAL: Performed by: STUDENT IN AN ORGANIZED HEALTH CARE EDUCATION/TRAINING PROGRAM

## 2025-05-09 PROCEDURE — 25010000002 FENTANYL CITRATE (PF) 50 MCG/ML SOLUTION: Performed by: RADIOLOGY

## 2025-05-09 PROCEDURE — 25010000002 ONDANSETRON PER 1 MG: Performed by: RADIOLOGY

## 2025-05-09 PROCEDURE — 96372 THER/PROPH/DIAG INJ SC/IM: CPT

## 2025-05-09 PROCEDURE — 25010000002 LIDOCAINE 1 % SOLUTION: Performed by: RADIOLOGY

## 2025-05-09 PROCEDURE — 25810000003 SODIUM CHLORIDE 0.9 % SOLUTION: Performed by: RADIOLOGY

## 2025-05-09 PROCEDURE — 96450 CHEMOTHERAPY INTO CNS: CPT

## 2025-05-09 RX ORDER — FENTANYL CITRATE 50 UG/ML
INJECTION, SOLUTION INTRAMUSCULAR; INTRAVENOUS AS NEEDED
Status: COMPLETED | OUTPATIENT
Start: 2025-05-09 | End: 2025-05-09

## 2025-05-09 RX ORDER — LIDOCAINE HYDROCHLORIDE 10 MG/ML
INJECTION, SOLUTION INFILTRATION; PERINEURAL AS NEEDED
Status: COMPLETED | OUTPATIENT
Start: 2025-05-09 | End: 2025-05-09

## 2025-05-09 RX ORDER — SODIUM CHLORIDE 9 MG/ML
75 INJECTION, SOLUTION INTRAVENOUS CONTINUOUS
Status: DISPENSED | OUTPATIENT
Start: 2025-05-09 | End: 2025-05-09

## 2025-05-09 RX ORDER — ONDANSETRON 2 MG/ML
INJECTION INTRAMUSCULAR; INTRAVENOUS AS NEEDED
Status: COMPLETED | OUTPATIENT
Start: 2025-05-09 | End: 2025-05-09

## 2025-05-09 RX ORDER — HEPARIN SODIUM (PORCINE) LOCK FLUSH IV SOLN 100 UNIT/ML 100 UNIT/ML
500 SOLUTION INTRAVENOUS AS NEEDED
Status: DISCONTINUED | OUTPATIENT
Start: 2025-05-09 | End: 2025-05-10 | Stop reason: HOSPADM

## 2025-05-09 RX ORDER — HEPARIN SODIUM (PORCINE) LOCK FLUSH IV SOLN 100 UNIT/ML 100 UNIT/ML
SOLUTION INTRAVENOUS
Status: DISPENSED
Start: 2025-05-09 | End: 2025-05-09

## 2025-05-09 RX ADMIN — LIDOCAINE HYDROCHLORIDE 4 ML: 10 INJECTION, SOLUTION INFILTRATION; PERINEURAL at 10:54

## 2025-05-09 RX ADMIN — Medication 500 UNITS: at 12:02

## 2025-05-09 RX ADMIN — PEGFLILGRASTIM-FPGK 6 MG: 6 INJECTION, SOLUTION SUBCUTANEOUS at 12:24

## 2025-05-09 RX ADMIN — ONDANSETRON 4 MG: 2 INJECTION INTRAMUSCULAR; INTRAVENOUS at 10:37

## 2025-05-09 RX ADMIN — FENTANYL CITRATE 100 MCG: 50 INJECTION, SOLUTION INTRAMUSCULAR; INTRAVENOUS at 10:50

## 2025-05-09 RX ADMIN — SODIUM CHLORIDE 75 ML/HR: 9 INJECTION, SOLUTION INTRAVENOUS at 11:15

## 2025-05-09 RX ADMIN — SODIUM CHLORIDE 12 MG: 9 INJECTION, SOLUTION INTRAMUSCULAR; INTRAVENOUS; SUBCUTANEOUS at 10:57

## 2025-05-09 NOTE — PROGRESS NOTES
Patient in clinic for Stimufend. Patient given injection per MAR and tolerated. Patient discharged, declined AVS, aware of next appointment.

## 2025-05-10 LAB — Lab: NORMAL

## 2025-05-12 ENCOUNTER — HOSPITAL ENCOUNTER (OUTPATIENT)
Dept: INFUSION THERAPY | Facility: HOSPITAL | Age: 70
Discharge: HOME | End: 2025-05-12
Payer: MEDICARE

## 2025-05-12 ENCOUNTER — HOSPITAL ENCOUNTER (OUTPATIENT)
Dept: ONCOLOGY | Facility: HOSPITAL | Age: 70
Discharge: HOME | End: 2025-05-12
Admitting: STUDENT IN AN ORGANIZED HEALTH CARE EDUCATION/TRAINING PROGRAM
Payer: MEDICARE

## 2025-05-12 ENCOUNTER — OFFICE VISIT (OUTPATIENT)
Dept: ONCOLOGY | Facility: CLINIC | Age: 70
End: 2025-05-12
Payer: MEDICARE

## 2025-05-12 VITALS
HEART RATE: 92 BPM | SYSTOLIC BLOOD PRESSURE: 80 MMHG | TEMPERATURE: 97.7 F | OXYGEN SATURATION: 95 % | BODY MASS INDEX: 27.79 KG/M2 | WEIGHT: 151 LBS | DIASTOLIC BLOOD PRESSURE: 50 MMHG | HEIGHT: 62 IN

## 2025-05-12 VITALS
HEART RATE: 92 BPM | TEMPERATURE: 97.7 F | OXYGEN SATURATION: 95 % | HEIGHT: 62 IN | RESPIRATION RATE: 16 BRPM | DIASTOLIC BLOOD PRESSURE: 58 MMHG | WEIGHT: 151 LBS | BODY MASS INDEX: 27.79 KG/M2 | SYSTOLIC BLOOD PRESSURE: 90 MMHG

## 2025-05-12 DIAGNOSIS — R53.1 WEAKNESS: ICD-10-CM

## 2025-05-12 DIAGNOSIS — E86.0 DEHYDRATION: Primary | ICD-10-CM

## 2025-05-12 DIAGNOSIS — D64.9 ANEMIA, UNSPECIFIED TYPE: ICD-10-CM

## 2025-05-12 DIAGNOSIS — E86.0 DEHYDRATION: ICD-10-CM

## 2025-05-12 DIAGNOSIS — C85.80 LYMPHOMA MALIGNANT, LARGE CELL: ICD-10-CM

## 2025-05-12 DIAGNOSIS — D64.9 SYMPTOMATIC ANEMIA: Primary | ICD-10-CM

## 2025-05-12 DIAGNOSIS — D64.9 SYMPTOMATIC ANEMIA: ICD-10-CM

## 2025-05-12 DIAGNOSIS — D64.9 ANEMIA, UNSPECIFIED TYPE: Primary | ICD-10-CM

## 2025-05-12 DIAGNOSIS — C83.31 DIFFUSE LARGE B-CELL LYMPHOMA, LYMPH NODES OF HEAD, FACE, AND NECK: ICD-10-CM

## 2025-05-12 DIAGNOSIS — Z45.2 ENCOUNTER FOR CARE RELATED TO VASCULAR ACCESS PORT: Primary | ICD-10-CM

## 2025-05-12 DIAGNOSIS — Z45.2 ENCOUNTER FOR CARE RELATED TO VASCULAR ACCESS PORT: ICD-10-CM

## 2025-05-12 LAB
ABO GROUP BLD: NORMAL
ALBUMIN SERPL-MCNC: 3.5 G/DL (ref 3.5–5.2)
ALBUMIN/GLOB SERPL: 2.7 G/DL
ALP SERPL-CCNC: 130 U/L (ref 39–117)
ALT SERPL W P-5'-P-CCNC: 33 U/L (ref 1–33)
ANION GAP SERPL CALCULATED.3IONS-SCNC: 11.4 MMOL/L (ref 5–15)
ANISOCYTOSIS BLD QL: ABNORMAL
AST SERPL-CCNC: 22 U/L (ref 1–32)
BASOPHILS # BLD AUTO: 0.03 10*3/MM3 (ref 0–0.2)
BASOPHILS NFR BLD AUTO: 0.1 % (ref 0–1.5)
BB HOLD TUBE: NORMAL
BILIRUB SERPL-MCNC: 2.8 MG/DL (ref 0–1.2)
BLD GP AB SCN SERPL QL: NEGATIVE
BUN SERPL-MCNC: 18 MG/DL (ref 8–23)
BUN/CREAT SERPL: 23.1 (ref 7–25)
CALCIUM SPEC-SCNC: 8.6 MG/DL (ref 8.6–10.5)
CHLORIDE SERPL-SCNC: 100 MMOL/L (ref 98–107)
CO2 SERPL-SCNC: 23.6 MMOL/L (ref 22–29)
CREAT SERPL-MCNC: 0.78 MG/DL (ref 0.57–1)
DEPRECATED RDW RBC AUTO: 63.7 FL (ref 37–54)
DEPRECATED RDW RBC AUTO: 67.5 FL (ref 37–54)
EGFRCR SERPLBLD CKD-EPI 2021: 81.8 ML/MIN/1.73
EOSINOPHIL # BLD AUTO: 0.02 10*3/MM3 (ref 0–0.4)
EOSINOPHIL NFR BLD AUTO: 0.1 % (ref 0.3–6.2)
ERYTHROCYTE [DISTWIDTH] IN BLOOD BY AUTOMATED COUNT: 17 % (ref 12.3–15.4)
ERYTHROCYTE [DISTWIDTH] IN BLOOD BY AUTOMATED COUNT: 17.2 % (ref 12.3–15.4)
GLOBULIN UR ELPH-MCNC: 1.3 GM/DL
GLUCOSE SERPL-MCNC: 189 MG/DL (ref 65–99)
HCT VFR BLD AUTO: 21.5 % (ref 34–46.6)
HCT VFR BLD AUTO: 23.6 % (ref 34–46.6)
HCT VFR BLD AUTO: 25.8 % (ref 34–46.6)
HGB BLD-MCNC: 6.3 G/DL (ref 12–15.9)
HGB BLD-MCNC: 7.1 G/DL (ref 12–15.9)
HGB BLD-MCNC: 7.7 G/DL (ref 12–15.9)
INR PPP: 1.13 (ref 0.9–1.1)
LAB AP CASE REPORT: NORMAL
LAB AP FLOW CYTOMETRY SUMMARY: NORMAL
LARGE PLATELETS: ABNORMAL
LYMPHOCYTES # BLD AUTO: 2.72 10*3/MM3 (ref 0.7–3.1)
LYMPHOCYTES # BLD MANUAL: 2.22 10*3/MM3 (ref 0.7–3.1)
LYMPHOCYTES NFR BLD AUTO: 11.2 % (ref 19.6–45.3)
MACROCYTES BLD QL SMEAR: ABNORMAL
MCH RBC QN AUTO: 31.2 PG (ref 26.6–33)
MCH RBC QN AUTO: 32.4 PG (ref 26.6–33)
MCHC RBC AUTO-ENTMCNC: 29.3 G/DL (ref 31.5–35.7)
MCHC RBC AUTO-ENTMCNC: 30.1 G/DL (ref 31.5–35.7)
MCV RBC AUTO: 106.4 FL (ref 79–97)
MCV RBC AUTO: 107.8 FL (ref 79–97)
METAMYELOCYTES NFR BLD MANUAL: 1 % (ref 0–0)
MONOCYTES # BLD AUTO: 0.3 10*3/MM3 (ref 0.1–0.9)
MONOCYTES NFR BLD AUTO: 1.2 % (ref 5–12)
NEUTROPHILS # BLD AUTO: 19.72 10*3/MM3 (ref 1.7–7)
NEUTROPHILS NFR BLD AUTO: 21.11 10*3/MM3 (ref 1.7–7)
NEUTROPHILS NFR BLD AUTO: 87.4 % (ref 42.7–76)
NEUTROPHILS NFR BLD MANUAL: 79 % (ref 42.7–76)
NEUTS BAND NFR BLD MANUAL: 10 % (ref 0–5)
NEUTS HYPERSEG # BLD: ABNORMAL 10*3/UL
PATH REPORT.FINAL DX SPEC: NORMAL
PATH REPORT.GROSS SPEC: NORMAL
PLATELET # BLD AUTO: 102 10*3/MM3 (ref 140–450)
PLATELET # BLD AUTO: 85 10*3/MM3 (ref 140–450)
PMV BLD AUTO: 10 FL (ref 6–12)
PMV BLD AUTO: 9.9 FL (ref 6–12)
POIKILOCYTOSIS BLD QL SMEAR: ABNORMAL
POTASSIUM SERPL-SCNC: 3.9 MMOL/L (ref 3.5–5.2)
PROT SERPL-MCNC: 4.8 G/DL (ref 6–8.5)
PROTHROMBIN TIME: 14.5 SECONDS (ref 11.7–14.2)
RBC # BLD AUTO: 2.02 10*6/MM3 (ref 3.77–5.28)
RBC # BLD AUTO: 2.19 10*6/MM3 (ref 3.77–5.28)
RH BLD: POSITIVE
SCAN SLIDE: NORMAL
SMALL PLATELETS BLD QL SMEAR: ABNORMAL
SODIUM SERPL-SCNC: 135 MMOL/L (ref 136–145)
T&S EXPIRATION DATE: NORMAL
TOXIC GRANULATION: ABNORMAL
VARIANT LYMPHS NFR BLD MANUAL: 10 % (ref 19.6–45.3)
WBC NRBC COR # BLD AUTO: 22.16 10*3/MM3 (ref 3.4–10.8)
WBC NRBC COR # BLD AUTO: 24.18 10*3/MM3 (ref 3.4–10.8)

## 2025-05-12 PROCEDURE — 36591 DRAW BLOOD OFF VENOUS DEVICE: CPT

## 2025-05-12 PROCEDURE — 86901 BLOOD TYPING SEROLOGIC RH(D): CPT | Performed by: NURSE PRACTITIONER

## 2025-05-12 PROCEDURE — 25810000003 SODIUM CHLORIDE 0.9 % SOLUTION: Performed by: STUDENT IN AN ORGANIZED HEALTH CARE EDUCATION/TRAINING PROGRAM

## 2025-05-12 PROCEDURE — 25010000002 HEPARIN LOCK FLUSH PER 10 UNITS: Performed by: STUDENT IN AN ORGANIZED HEALTH CARE EDUCATION/TRAINING PROGRAM

## 2025-05-12 PROCEDURE — 86900 BLOOD TYPING SEROLOGIC ABO: CPT

## 2025-05-12 PROCEDURE — 85025 COMPLETE CBC W/AUTO DIFF WBC: CPT | Performed by: NURSE PRACTITIONER

## 2025-05-12 PROCEDURE — 96360 HYDRATION IV INFUSION INIT: CPT

## 2025-05-12 PROCEDURE — 80053 COMPREHEN METABOLIC PANEL: CPT | Performed by: STUDENT IN AN ORGANIZED HEALTH CARE EDUCATION/TRAINING PROGRAM

## 2025-05-12 PROCEDURE — 85610 PROTHROMBIN TIME: CPT | Performed by: STUDENT IN AN ORGANIZED HEALTH CARE EDUCATION/TRAINING PROGRAM

## 2025-05-12 PROCEDURE — 86850 RBC ANTIBODY SCREEN: CPT | Performed by: NURSE PRACTITIONER

## 2025-05-12 PROCEDURE — P9016 RBC LEUKOCYTES REDUCED: HCPCS

## 2025-05-12 PROCEDURE — 85018 HEMOGLOBIN: CPT | Performed by: NURSE PRACTITIONER

## 2025-05-12 PROCEDURE — 1160F RVW MEDS BY RX/DR IN RCRD: CPT | Performed by: NURSE PRACTITIONER

## 2025-05-12 PROCEDURE — 85025 COMPLETE CBC W/AUTO DIFF WBC: CPT | Performed by: STUDENT IN AN ORGANIZED HEALTH CARE EDUCATION/TRAINING PROGRAM

## 2025-05-12 PROCEDURE — 1159F MED LIST DOCD IN RCRD: CPT | Performed by: NURSE PRACTITIONER

## 2025-05-12 PROCEDURE — 86923 COMPATIBILITY TEST ELECTRIC: CPT

## 2025-05-12 PROCEDURE — 85007 BL SMEAR W/DIFF WBC COUNT: CPT | Performed by: NURSE PRACTITIONER

## 2025-05-12 PROCEDURE — 99214 OFFICE O/P EST MOD 30 MIN: CPT | Performed by: NURSE PRACTITIONER

## 2025-05-12 PROCEDURE — 36430 TRANSFUSION BLD/BLD COMPNT: CPT

## 2025-05-12 PROCEDURE — 85014 HEMATOCRIT: CPT | Performed by: NURSE PRACTITIONER

## 2025-05-12 PROCEDURE — 3074F SYST BP LT 130 MM HG: CPT | Performed by: NURSE PRACTITIONER

## 2025-05-12 PROCEDURE — 1126F AMNT PAIN NOTED NONE PRSNT: CPT | Performed by: NURSE PRACTITIONER

## 2025-05-12 PROCEDURE — 3078F DIAST BP <80 MM HG: CPT | Performed by: NURSE PRACTITIONER

## 2025-05-12 PROCEDURE — G2211 COMPLEX E/M VISIT ADD ON: HCPCS | Performed by: NURSE PRACTITIONER

## 2025-05-12 PROCEDURE — 86900 BLOOD TYPING SEROLOGIC ABO: CPT | Performed by: NURSE PRACTITIONER

## 2025-05-12 RX ORDER — SODIUM CHLORIDE 0.9 % (FLUSH) 0.9 %
20 SYRINGE (ML) INJECTION AS NEEDED
Status: CANCELLED | OUTPATIENT
Start: 2025-05-12

## 2025-05-12 RX ORDER — SODIUM CHLORIDE 9 MG/ML
250 INJECTION, SOLUTION INTRAVENOUS ONCE
Status: CANCELLED | OUTPATIENT
Start: 2025-05-12

## 2025-05-12 RX ORDER — HEPARIN SODIUM (PORCINE) LOCK FLUSH IV SOLN 100 UNIT/ML 100 UNIT/ML
500 SOLUTION INTRAVENOUS AS NEEDED
Status: CANCELLED | OUTPATIENT
Start: 2025-05-12

## 2025-05-12 RX ORDER — HEPARIN SODIUM (PORCINE) LOCK FLUSH IV SOLN 100 UNIT/ML 100 UNIT/ML
500 SOLUTION INTRAVENOUS AS NEEDED
Status: DISCONTINUED | OUTPATIENT
Start: 2025-05-12 | End: 2025-05-14 | Stop reason: HOSPADM

## 2025-05-12 RX ORDER — HEPARIN SODIUM (PORCINE) LOCK FLUSH IV SOLN 100 UNIT/ML 100 UNIT/ML
500 SOLUTION INTRAVENOUS AS NEEDED
Status: DISCONTINUED | OUTPATIENT
Start: 2025-05-12 | End: 2025-05-13 | Stop reason: HOSPADM

## 2025-05-12 RX ORDER — SODIUM CHLORIDE 0.9 % (FLUSH) 0.9 %
20 SYRINGE (ML) INJECTION AS NEEDED
Status: DISCONTINUED | OUTPATIENT
Start: 2025-05-12 | End: 2025-05-14 | Stop reason: HOSPADM

## 2025-05-12 RX ORDER — SODIUM CHLORIDE 9 MG/ML
250 INJECTION, SOLUTION INTRAVENOUS ONCE
Status: DISCONTINUED | OUTPATIENT
Start: 2025-05-12 | End: 2025-05-14 | Stop reason: HOSPADM

## 2025-05-12 RX ORDER — SODIUM CHLORIDE 0.9 % (FLUSH) 0.9 %
20 SYRINGE (ML) INJECTION AS NEEDED
Status: DISCONTINUED | OUTPATIENT
Start: 2025-05-12 | End: 2025-05-13 | Stop reason: HOSPADM

## 2025-05-12 RX ADMIN — Medication 20 ML: at 13:02

## 2025-05-12 RX ADMIN — Medication 500 UNITS: at 13:03

## 2025-05-12 RX ADMIN — SODIUM CHLORIDE 1000 ML: 900 INJECTION, SOLUTION INTRAVENOUS at 12:02

## 2025-05-12 RX ADMIN — Medication 20 ML: at 18:11

## 2025-05-12 RX ADMIN — HEPARIN 500 UNITS: 100 SYRINGE at 18:11

## 2025-05-12 NOTE — PROGRESS NOTES
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  Will continue throughout treatment.  Reviewed with the patient.  Continue to monitor weekly  labs    ID: Started patient on antiviral and PCP prophylaxis with acyclovir and Bactrim. Bactrim has been held due to concern about drug induced rash and thrombocytopenia.  started patient on Daily dapsone [atovaquone not covered by insurance].  Reviewed with the patient.    Cytopenias: Monitor biweekly CBC and CMP and transfuse PRN to maintain Hb >7.5 and Plt count >10K. She required 1 unit RBC transfusion last week after cycle 3 chemotherapy  Check iron profile, ferritin, B12, folate today and replace as indicated  1 unit of packed red blood cells today for hemoglobin of 7.1 g/dL    Skin Rash:   Thrombocytopenia:  This has improved. Patient is s/p completion of tapering steroids.   Persistent thrombocytopenia could be secondary to accidental use of bactrim recently.  Resolved      Bactrim allergy : Patient has had severe symptoms following accidentally taking Bactrim recently requiring her to be seen in ER, she was treated with IV Solu-Medrol, Benadryl and Pepcid.  Continue to hold off on Bactrim moving forward.  Patient was started on Dapsone.  G6PD levels were reported normal      Eye issues.  The patient's  ocular symptoms, including watery eyes and sensitivity to bright light, are unlikely to be a side effect of the current medication regimen. These symptoms could potentially be attributed to seasonal allergies. The patient is advised to continue using topical lubricating eyedrops for relief.      Leukocytosis: Secondary to G-CSF and steroids.  Continue to monitor    4 week follow up with Repeat PET/CT. Sooner as needed.      Requested the patient to call on 5/13/2025 if not feeling improved post IV fluids send transfusion of packed red blood cells or if she has any new concerning symptoms or complaints.    Thank you very much for providing the opportunity to participate in this patient’s care. Please do not hesitate to call if there are any other questions.

## 2025-05-12 NOTE — PROGRESS NOTES
Pt in office for IVF and Labs today. [facility] [place]. 10cc wasted. Labs drawn and sent to lab. Pt c/o SOA with exertion, fatigue and weakness. Pt stated she has had 2 episodes of diarrhea. BP 80/50 manually. Ann Marie Horner NP notified. Pt added to her schedule. Ambulatory notified of Blood orders. BP 90/58 manually. IVF administered. Pt discharged to Hospital for blood. Infusaport needle left in place.    none

## 2025-05-14 VITALS
TEMPERATURE: 97.9 F | DIASTOLIC BLOOD PRESSURE: 61 MMHG | HEART RATE: 80 BPM | RESPIRATION RATE: 18 BRPM | OXYGEN SATURATION: 95 % | SYSTOLIC BLOOD PRESSURE: 105 MMHG

## 2025-05-19 ENCOUNTER — HOSPITAL ENCOUNTER (OUTPATIENT)
Dept: ONCOLOGY | Facility: HOSPITAL | Age: 70
Discharge: HOME | End: 2025-05-19
Admitting: STUDENT IN AN ORGANIZED HEALTH CARE EDUCATION/TRAINING PROGRAM
Payer: MEDICARE

## 2025-05-19 VITALS
OXYGEN SATURATION: 90 % | TEMPERATURE: 97.8 F | BODY MASS INDEX: 27.88 KG/M2 | HEART RATE: 94 BPM | RESPIRATION RATE: 14 BRPM | DIASTOLIC BLOOD PRESSURE: 69 MMHG | SYSTOLIC BLOOD PRESSURE: 133 MMHG | WEIGHT: 151.5 LBS | HEIGHT: 62 IN

## 2025-05-19 DIAGNOSIS — E86.0 DEHYDRATION: Primary | ICD-10-CM

## 2025-05-19 DIAGNOSIS — Z45.2 ENCOUNTER FOR CARE RELATED TO VASCULAR ACCESS PORT: ICD-10-CM

## 2025-05-19 DIAGNOSIS — C83.31 DIFFUSE LARGE B-CELL LYMPHOMA, LYMPH NODES OF HEAD, FACE, AND NECK: ICD-10-CM

## 2025-05-19 DIAGNOSIS — R53.1 WEAKNESS: ICD-10-CM

## 2025-05-19 LAB
ALBUMIN SERPL-MCNC: 3.7 G/DL (ref 3.5–5.2)
ALBUMIN/GLOB SERPL: 2.5 G/DL
ALP SERPL-CCNC: 111 U/L (ref 39–117)
ALT SERPL W P-5'-P-CCNC: 28 U/L (ref 1–33)
ANION GAP SERPL CALCULATED.3IONS-SCNC: 9 MMOL/L (ref 5–15)
AST SERPL-CCNC: 28 U/L (ref 1–32)
BASOPHILS # BLD AUTO: 0.04 10*3/MM3 (ref 0–0.2)
BASOPHILS NFR BLD AUTO: 0.4 % (ref 0–1.5)
BILIRUB SERPL-MCNC: 1.8 MG/DL (ref 0–1.2)
BUN SERPL-MCNC: 8 MG/DL (ref 8–23)
BUN/CREAT SERPL: 12.9 (ref 7–25)
CALCIUM SPEC-SCNC: 9.1 MG/DL (ref 8.6–10.5)
CHLORIDE SERPL-SCNC: 101 MMOL/L (ref 98–107)
CO2 SERPL-SCNC: 25 MMOL/L (ref 22–29)
CREAT SERPL-MCNC: 0.62 MG/DL (ref 0.57–1)
DEPRECATED RDW RBC AUTO: 81.7 FL (ref 37–54)
EGFRCR SERPLBLD CKD-EPI 2021: 95.9 ML/MIN/1.73
EOSINOPHIL # BLD AUTO: 0.05 10*3/MM3 (ref 0–0.4)
EOSINOPHIL NFR BLD AUTO: 0.5 % (ref 0.3–6.2)
ERYTHROCYTE [DISTWIDTH] IN BLOOD BY AUTOMATED COUNT: 22.4 % (ref 12.3–15.4)
GLOBULIN UR ELPH-MCNC: 1.5 GM/DL
GLUCOSE SERPL-MCNC: 108 MG/DL (ref 65–99)
HCT VFR BLD AUTO: 33.5 % (ref 34–46.6)
HGB BLD-MCNC: 9.5 G/DL (ref 12–15.9)
LDH SERPL-CCNC: 504 U/L (ref 135–214)
LYMPHOCYTES # BLD AUTO: 3.7 10*3/MM3 (ref 0.7–3.1)
LYMPHOCYTES NFR BLD AUTO: 35.1 % (ref 19.6–45.3)
MAGNESIUM SERPL-MCNC: 1.9 MG/DL (ref 1.6–2.4)
MCH RBC QN AUTO: 30.7 PG (ref 26.6–33)
MCHC RBC AUTO-ENTMCNC: 28.4 G/DL (ref 31.5–35.7)
MCV RBC AUTO: 108.4 FL (ref 79–97)
MONOCYTES # BLD AUTO: 1.07 10*3/MM3 (ref 0.1–0.9)
MONOCYTES NFR BLD AUTO: 10.2 % (ref 5–12)
NEUTROPHILS NFR BLD AUTO: 5.67 10*3/MM3 (ref 1.7–7)
NEUTROPHILS NFR BLD AUTO: 53.8 % (ref 42.7–76)
PHOSPHATE SERPL-MCNC: 4.1 MG/DL (ref 2.5–4.5)
PLATELET # BLD AUTO: 213 10*3/MM3 (ref 140–450)
PMV BLD AUTO: 9 FL (ref 6–12)
POTASSIUM SERPL-SCNC: 4.4 MMOL/L (ref 3.5–5.2)
PROT SERPL-MCNC: 5.2 G/DL (ref 6–8.5)
RBC # BLD AUTO: 3.09 10*6/MM3 (ref 3.77–5.28)
SODIUM SERPL-SCNC: 135 MMOL/L (ref 136–145)
WBC NRBC COR # BLD AUTO: 10.53 10*3/MM3 (ref 3.4–10.8)

## 2025-05-19 PROCEDURE — 83735 ASSAY OF MAGNESIUM: CPT | Performed by: STUDENT IN AN ORGANIZED HEALTH CARE EDUCATION/TRAINING PROGRAM

## 2025-05-19 PROCEDURE — 80053 COMPREHEN METABOLIC PANEL: CPT | Performed by: STUDENT IN AN ORGANIZED HEALTH CARE EDUCATION/TRAINING PROGRAM

## 2025-05-19 PROCEDURE — 85025 COMPLETE CBC W/AUTO DIFF WBC: CPT | Performed by: STUDENT IN AN ORGANIZED HEALTH CARE EDUCATION/TRAINING PROGRAM

## 2025-05-19 PROCEDURE — 83615 LACTATE (LD) (LDH) ENZYME: CPT | Performed by: STUDENT IN AN ORGANIZED HEALTH CARE EDUCATION/TRAINING PROGRAM

## 2025-05-19 PROCEDURE — 84100 ASSAY OF PHOSPHORUS: CPT | Performed by: STUDENT IN AN ORGANIZED HEALTH CARE EDUCATION/TRAINING PROGRAM

## 2025-05-19 PROCEDURE — 25810000003 SODIUM CHLORIDE 0.9 % SOLUTION: Performed by: STUDENT IN AN ORGANIZED HEALTH CARE EDUCATION/TRAINING PROGRAM

## 2025-05-19 PROCEDURE — 96360 HYDRATION IV INFUSION INIT: CPT

## 2025-05-19 PROCEDURE — 25010000002 HEPARIN LOCK FLUSH PER 10 UNITS: Performed by: STUDENT IN AN ORGANIZED HEALTH CARE EDUCATION/TRAINING PROGRAM

## 2025-05-19 RX ORDER — HEPARIN SODIUM (PORCINE) LOCK FLUSH IV SOLN 100 UNIT/ML 100 UNIT/ML
500 SOLUTION INTRAVENOUS AS NEEDED
OUTPATIENT
Start: 2025-05-19

## 2025-05-19 RX ORDER — SODIUM CHLORIDE 0.9 % (FLUSH) 0.9 %
20 SYRINGE (ML) INJECTION AS NEEDED
OUTPATIENT
Start: 2025-05-19

## 2025-05-19 RX ORDER — HEPARIN SODIUM (PORCINE) LOCK FLUSH IV SOLN 100 UNIT/ML 100 UNIT/ML
500 SOLUTION INTRAVENOUS AS NEEDED
Status: DISCONTINUED | OUTPATIENT
Start: 2025-05-19 | End: 2025-05-20 | Stop reason: HOSPADM

## 2025-05-19 RX ORDER — SODIUM CHLORIDE 0.9 % (FLUSH) 0.9 %
20 SYRINGE (ML) INJECTION AS NEEDED
Status: DISCONTINUED | OUTPATIENT
Start: 2025-05-19 | End: 2025-05-20 | Stop reason: HOSPADM

## 2025-05-19 RX ADMIN — HEPARIN 500 UNITS: 100 SYRINGE at 14:51

## 2025-05-19 RX ADMIN — SODIUM CHLORIDE 1000 ML: 900 INJECTION, SOLUTION INTRAVENOUS at 13:43

## 2025-05-19 RX ADMIN — Medication 20 ML: at 14:50

## 2025-05-21 RX ORDER — ALLOPURINOL 300 MG/1
300 TABLET ORAL DAILY
Qty: 30 TABLET | Refills: 0 | Status: SHIPPED | OUTPATIENT
Start: 2025-05-21

## 2025-05-27 ENCOUNTER — HOSPITAL ENCOUNTER (OUTPATIENT)
Dept: ONCOLOGY | Facility: HOSPITAL | Age: 70
Discharge: HOME | End: 2025-05-27
Admitting: STUDENT IN AN ORGANIZED HEALTH CARE EDUCATION/TRAINING PROGRAM
Payer: MEDICARE

## 2025-05-27 ENCOUNTER — HOSPITAL ENCOUNTER (OUTPATIENT)
Dept: PET IMAGING | Facility: HOSPITAL | Age: 70
Discharge: HOME | End: 2025-05-27
Payer: MEDICARE

## 2025-05-27 VITALS
RESPIRATION RATE: 14 BRPM | DIASTOLIC BLOOD PRESSURE: 67 MMHG | OXYGEN SATURATION: 91 % | TEMPERATURE: 97.7 F | BODY MASS INDEX: 27.71 KG/M2 | HEIGHT: 62 IN | SYSTOLIC BLOOD PRESSURE: 118 MMHG | HEART RATE: 86 BPM

## 2025-05-27 DIAGNOSIS — C85.80 LYMPHOMA MALIGNANT, LARGE CELL: ICD-10-CM

## 2025-05-27 DIAGNOSIS — C83.31 DIFFUSE LARGE B-CELL LYMPHOMA, LYMPH NODES OF HEAD, FACE, AND NECK: ICD-10-CM

## 2025-05-27 DIAGNOSIS — C85.99 LYMPHOMA OF EXTRANODAL AND SOLID ORGAN SITES: ICD-10-CM

## 2025-05-27 DIAGNOSIS — R53.1 WEAKNESS: ICD-10-CM

## 2025-05-27 DIAGNOSIS — E86.0 DEHYDRATION: Primary | ICD-10-CM

## 2025-05-27 DIAGNOSIS — Z45.2 ENCOUNTER FOR CARE RELATED TO VASCULAR ACCESS PORT: ICD-10-CM

## 2025-05-27 LAB
ALBUMIN SERPL-MCNC: 3.7 G/DL (ref 3.5–5.2)
ALBUMIN/GLOB SERPL: 2.8 G/DL
ALP SERPL-CCNC: 82 U/L (ref 39–117)
ALT SERPL W P-5'-P-CCNC: 22 U/L (ref 1–33)
ANION GAP SERPL CALCULATED.3IONS-SCNC: 11 MMOL/L (ref 5–15)
AST SERPL-CCNC: 28 U/L (ref 1–32)
BASOPHILS # BLD AUTO: 0.09 10*3/MM3 (ref 0–0.2)
BASOPHILS NFR BLD AUTO: 0.7 % (ref 0–1.5)
BILIRUB SERPL-MCNC: 2.2 MG/DL (ref 0–1.2)
BUN SERPL-MCNC: 6.8 MG/DL (ref 8–23)
BUN/CREAT SERPL: 11 (ref 7–25)
CALCIUM SPEC-SCNC: 9 MG/DL (ref 8.6–10.5)
CHLORIDE SERPL-SCNC: 108 MMOL/L (ref 98–107)
CO2 SERPL-SCNC: 22 MMOL/L (ref 22–29)
CREAT SERPL-MCNC: 0.62 MG/DL (ref 0.57–1)
DEPRECATED RDW RBC AUTO: 84.6 FL (ref 37–54)
EGFRCR SERPLBLD CKD-EPI 2021: 95.9 ML/MIN/1.73
EOSINOPHIL # BLD AUTO: 0.05 10*3/MM3 (ref 0–0.4)
EOSINOPHIL NFR BLD AUTO: 0.4 % (ref 0.3–6.2)
ERYTHROCYTE [DISTWIDTH] IN BLOOD BY AUTOMATED COUNT: 21.8 % (ref 12.3–15.4)
GLOBULIN UR ELPH-MCNC: 1.3 GM/DL
GLUCOSE BLDC GLUCOMTR-MCNC: 105 MG/DL (ref 70–105)
GLUCOSE SERPL-MCNC: 103 MG/DL (ref 65–99)
HCT VFR BLD AUTO: 32 % (ref 34–46.6)
HGB BLD-MCNC: 9.2 G/DL (ref 12–15.9)
LDH SERPL-CCNC: 529 U/L (ref 135–214)
LYMPHOCYTES # BLD AUTO: 4.59 10*3/MM3 (ref 0.7–3.1)
LYMPHOCYTES NFR BLD AUTO: 35.7 % (ref 19.6–45.3)
MAGNESIUM SERPL-MCNC: 1.9 MG/DL (ref 1.6–2.4)
MCH RBC QN AUTO: 31.8 PG (ref 26.6–33)
MCHC RBC AUTO-ENTMCNC: 28.8 G/DL (ref 31.5–35.7)
MCV RBC AUTO: 110.7 FL (ref 79–97)
MONOCYTES # BLD AUTO: 1.09 10*3/MM3 (ref 0.1–0.9)
MONOCYTES NFR BLD AUTO: 8.5 % (ref 5–12)
NEUTROPHILS NFR BLD AUTO: 54.7 % (ref 42.7–76)
NEUTROPHILS NFR BLD AUTO: 7.03 10*3/MM3 (ref 1.7–7)
PHOSPHATE SERPL-MCNC: 4 MG/DL (ref 2.5–4.5)
PLATELET # BLD AUTO: 229 10*3/MM3 (ref 140–450)
PMV BLD AUTO: 8.5 FL (ref 6–12)
POTASSIUM SERPL-SCNC: 4.3 MMOL/L (ref 3.5–5.2)
PROT SERPL-MCNC: 5 G/DL (ref 6–8.5)
RBC # BLD AUTO: 2.89 10*6/MM3 (ref 3.77–5.28)
SODIUM SERPL-SCNC: 141 MMOL/L (ref 136–145)
WBC NRBC COR # BLD AUTO: 12.85 10*3/MM3 (ref 3.4–10.8)

## 2025-05-27 PROCEDURE — 85025 COMPLETE CBC W/AUTO DIFF WBC: CPT | Performed by: STUDENT IN AN ORGANIZED HEALTH CARE EDUCATION/TRAINING PROGRAM

## 2025-05-27 PROCEDURE — 36591 DRAW BLOOD OFF VENOUS DEVICE: CPT

## 2025-05-27 PROCEDURE — 34310000005 FLUDEOXYGLUCOSE F18 SOLUTION: Performed by: STUDENT IN AN ORGANIZED HEALTH CARE EDUCATION/TRAINING PROGRAM

## 2025-05-27 PROCEDURE — 25810000003 SODIUM CHLORIDE 0.9 % SOLUTION: Performed by: STUDENT IN AN ORGANIZED HEALTH CARE EDUCATION/TRAINING PROGRAM

## 2025-05-27 PROCEDURE — 78815 PET IMAGE W/CT SKULL-THIGH: CPT

## 2025-05-27 PROCEDURE — 82948 REAGENT STRIP/BLOOD GLUCOSE: CPT

## 2025-05-27 PROCEDURE — 25010000002 HEPARIN LOCK FLUSH PER 10 UNITS: Performed by: STUDENT IN AN ORGANIZED HEALTH CARE EDUCATION/TRAINING PROGRAM

## 2025-05-27 PROCEDURE — 80053 COMPREHEN METABOLIC PANEL: CPT | Performed by: STUDENT IN AN ORGANIZED HEALTH CARE EDUCATION/TRAINING PROGRAM

## 2025-05-27 PROCEDURE — 83615 LACTATE (LD) (LDH) ENZYME: CPT | Performed by: STUDENT IN AN ORGANIZED HEALTH CARE EDUCATION/TRAINING PROGRAM

## 2025-05-27 PROCEDURE — 83735 ASSAY OF MAGNESIUM: CPT | Performed by: STUDENT IN AN ORGANIZED HEALTH CARE EDUCATION/TRAINING PROGRAM

## 2025-05-27 PROCEDURE — 84100 ASSAY OF PHOSPHORUS: CPT | Performed by: STUDENT IN AN ORGANIZED HEALTH CARE EDUCATION/TRAINING PROGRAM

## 2025-05-27 PROCEDURE — 96360 HYDRATION IV INFUSION INIT: CPT

## 2025-05-27 PROCEDURE — A9552 F18 FDG: HCPCS | Performed by: STUDENT IN AN ORGANIZED HEALTH CARE EDUCATION/TRAINING PROGRAM

## 2025-05-27 RX ORDER — SODIUM CHLORIDE 0.9 % (FLUSH) 0.9 %
20 SYRINGE (ML) INJECTION AS NEEDED
Status: DISCONTINUED | OUTPATIENT
Start: 2025-05-27 | End: 2025-05-28 | Stop reason: HOSPADM

## 2025-05-27 RX ORDER — HEPARIN SODIUM (PORCINE) LOCK FLUSH IV SOLN 100 UNIT/ML 100 UNIT/ML
500 SOLUTION INTRAVENOUS AS NEEDED
OUTPATIENT
Start: 2025-05-27

## 2025-05-27 RX ORDER — HEPARIN SODIUM (PORCINE) LOCK FLUSH IV SOLN 100 UNIT/ML 100 UNIT/ML
500 SOLUTION INTRAVENOUS AS NEEDED
Status: DISCONTINUED | OUTPATIENT
Start: 2025-05-27 | End: 2025-05-28 | Stop reason: HOSPADM

## 2025-05-27 RX ORDER — SODIUM CHLORIDE 0.9 % (FLUSH) 0.9 %
20 SYRINGE (ML) INJECTION AS NEEDED
OUTPATIENT
Start: 2025-05-27

## 2025-05-27 RX ADMIN — SODIUM CHLORIDE 1000 ML: 0.9 INJECTION, SOLUTION INTRAVENOUS at 10:37

## 2025-05-27 RX ADMIN — HEPARIN 500 UNITS: 100 SYRINGE at 11:46

## 2025-05-27 RX ADMIN — FLUDEOXYGLUCOSE F 18 1 DOSE: 200 INJECTION, SOLUTION INTRAVENOUS at 09:05

## 2025-05-27 RX ADMIN — Medication 20 ML: at 11:46

## 2025-05-30 NOTE — PROGRESS NOTES
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 insurance].  This has been discontinued.    Cytopenias: Monitor biweekly CBC and CMP and transfuse PRN to maintain Hb >7.5 and Plt count >10K. She required 1 unit RBC transfusion last week after cycle 3 chemotherapy  Check iron profile, ferritin, B12, folate today and replace as indicated  1 unit of packed red blood cells with C6 for hemoglobin of 7.1 g/dL  Hb has improved to 9.6 today. Continue to monitor.    Skin Rash:   Thrombocytopenia:  This has improved. Patient is s/p completion of tapering steroids.   Persistent thrombocytopenia could be secondary to accidental use of bactrim recently.  Resolved      Bactrim allergy : Patient has had severe symptoms following accidentally taking Bactrim recently requiring her to be seen in ER, she was treated with IV Solu-Medrol, Benadryl and Pepcid.  Continue to hold off on Bactrim moving forward.  Patient was started on Dapsone.  G6PD levels were reported normal      Eye issues.  The patient's ocular symptoms, including watery eyes and sensitivity to bright light, are unlikely to be a side effect of the current medication regimen. These symptoms could potentially be attributed to seasonal allergies. The patient is advised to continue using topical lubricating eyedrops for relief.      Leukocytosis: Secondary to G-CSF and steroids.resolved after completion of therapy    3 month follow up  with labs, Sooner as needed.  Check CBC, CMP in 4 weeks.    Thank you very much for providing the opportunity to participate in this patient’s care. Please do not hesitate to call if there are any other questions.

## 2025-06-04 ENCOUNTER — HOSPITAL ENCOUNTER (OUTPATIENT)
Dept: ONCOLOGY | Facility: HOSPITAL | Age: 70
Discharge: HOME | End: 2025-06-04
Payer: MEDICARE

## 2025-06-04 ENCOUNTER — OFFICE VISIT (OUTPATIENT)
Dept: ONCOLOGY | Facility: CLINIC | Age: 70
End: 2025-06-04
Payer: MEDICARE

## 2025-06-04 VITALS
OXYGEN SATURATION: 96 % | SYSTOLIC BLOOD PRESSURE: 129 MMHG | WEIGHT: 152.2 LBS | DIASTOLIC BLOOD PRESSURE: 74 MMHG | HEART RATE: 74 BPM | BODY MASS INDEX: 28.01 KG/M2 | HEIGHT: 62 IN

## 2025-06-04 DIAGNOSIS — E80.6 HYPERBILIRUBINEMIA: ICD-10-CM

## 2025-06-04 DIAGNOSIS — R53.1 WEAKNESS: ICD-10-CM

## 2025-06-04 DIAGNOSIS — C83.31 DIFFUSE LARGE B-CELL LYMPHOMA, LYMPH NODES OF HEAD, FACE, AND NECK: ICD-10-CM

## 2025-06-04 DIAGNOSIS — C85.80 LYMPHOMA MALIGNANT, LARGE CELL: ICD-10-CM

## 2025-06-04 DIAGNOSIS — E86.0 DEHYDRATION: Primary | ICD-10-CM

## 2025-06-04 DIAGNOSIS — Z45.2 ENCOUNTER FOR CARE RELATED TO VASCULAR ACCESS PORT: ICD-10-CM

## 2025-06-04 DIAGNOSIS — D64.9 ANEMIA, UNSPECIFIED TYPE: ICD-10-CM

## 2025-06-04 DIAGNOSIS — Z45.2 ENCOUNTER FOR CARE RELATED TO VASCULAR ACCESS PORT: Primary | ICD-10-CM

## 2025-06-04 LAB
ALBUMIN SERPL-MCNC: 3.6 G/DL (ref 3.5–5.2)
ALBUMIN/GLOB SERPL: 2.6 G/DL
ALP SERPL-CCNC: 67 U/L (ref 39–117)
ALT SERPL W P-5'-P-CCNC: 17 U/L (ref 1–33)
ANION GAP SERPL CALCULATED.3IONS-SCNC: 12.2 MMOL/L (ref 5–15)
AST SERPL-CCNC: 26 U/L (ref 1–32)
BASOPHILS # BLD AUTO: 0.12 10*3/MM3 (ref 0–0.2)
BASOPHILS NFR BLD AUTO: 1.5 % (ref 0–1.5)
BILIRUB SERPL-MCNC: 1.2 MG/DL (ref 0–1.2)
BUN SERPL-MCNC: 8.6 MG/DL (ref 8–23)
BUN/CREAT SERPL: 12.3 (ref 7–25)
CALCIUM SPEC-SCNC: 9.4 MG/DL (ref 8.6–10.5)
CHLORIDE SERPL-SCNC: 107 MMOL/L (ref 98–107)
CO2 SERPL-SCNC: 22.8 MMOL/L (ref 22–29)
CREAT SERPL-MCNC: 0.7 MG/DL (ref 0.57–1)
DEPRECATED RDW RBC AUTO: 67.2 FL (ref 37–54)
EGFRCR SERPLBLD CKD-EPI 2021: 93.2 ML/MIN/1.73
EOSINOPHIL # BLD AUTO: 0.03 10*3/MM3 (ref 0–0.4)
EOSINOPHIL NFR BLD AUTO: 0.4 % (ref 0.3–6.2)
ERYTHROCYTE [DISTWIDTH] IN BLOOD BY AUTOMATED COUNT: 17 % (ref 12.3–15.4)
GLOBULIN UR ELPH-MCNC: 1.4 GM/DL
GLUCOSE SERPL-MCNC: 106 MG/DL (ref 65–99)
HCT VFR BLD AUTO: 31.3 % (ref 34–46.6)
HGB BLD-MCNC: 9.6 G/DL (ref 12–15.9)
LDH SERPL-CCNC: 390 U/L (ref 135–214)
LDH SERPL-CCNC: 394 U/L (ref 135–214)
LYMPHOCYTES # BLD AUTO: 3.31 10*3/MM3 (ref 0.7–3.1)
LYMPHOCYTES NFR BLD AUTO: 41.2 % (ref 19.6–45.3)
MAGNESIUM SERPL-MCNC: 1.9 MG/DL (ref 1.6–2.4)
MCH RBC QN AUTO: 33.3 PG (ref 26.6–33)
MCHC RBC AUTO-ENTMCNC: 30.7 G/DL (ref 31.5–35.7)
MCV RBC AUTO: 108.7 FL (ref 79–97)
MONOCYTES # BLD AUTO: 0.93 10*3/MM3 (ref 0.1–0.9)
MONOCYTES NFR BLD AUTO: 11.6 % (ref 5–12)
NEUTROPHILS NFR BLD AUTO: 3.65 10*3/MM3 (ref 1.7–7)
NEUTROPHILS NFR BLD AUTO: 45.3 % (ref 42.7–76)
PHOSPHATE SERPL-MCNC: 4.8 MG/DL (ref 2.5–4.5)
PLATELET # BLD AUTO: 228 10*3/MM3 (ref 140–450)
PMV BLD AUTO: 8.5 FL (ref 6–12)
POTASSIUM SERPL-SCNC: 4.2 MMOL/L (ref 3.5–5.2)
PROT SERPL-MCNC: 5 G/DL (ref 6–8.5)
RBC # BLD AUTO: 2.88 10*6/MM3 (ref 3.77–5.28)
SODIUM SERPL-SCNC: 142 MMOL/L (ref 136–145)
URATE SERPL-MCNC: 3.2 MG/DL (ref 2.4–5.7)
WBC NRBC COR # BLD AUTO: 8.04 10*3/MM3 (ref 3.4–10.8)

## 2025-06-04 PROCEDURE — 25010000002 HEPARIN LOCK FLUSH PER 10 UNITS: Performed by: STUDENT IN AN ORGANIZED HEALTH CARE EDUCATION/TRAINING PROGRAM

## 2025-06-04 PROCEDURE — 3074F SYST BP LT 130 MM HG: CPT | Performed by: STUDENT IN AN ORGANIZED HEALTH CARE EDUCATION/TRAINING PROGRAM

## 2025-06-04 PROCEDURE — 84550 ASSAY OF BLOOD/URIC ACID: CPT | Performed by: STUDENT IN AN ORGANIZED HEALTH CARE EDUCATION/TRAINING PROGRAM

## 2025-06-04 PROCEDURE — 84100 ASSAY OF PHOSPHORUS: CPT | Performed by: STUDENT IN AN ORGANIZED HEALTH CARE EDUCATION/TRAINING PROGRAM

## 2025-06-04 PROCEDURE — 83615 LACTATE (LD) (LDH) ENZYME: CPT | Performed by: STUDENT IN AN ORGANIZED HEALTH CARE EDUCATION/TRAINING PROGRAM

## 2025-06-04 PROCEDURE — 99214 OFFICE O/P EST MOD 30 MIN: CPT | Performed by: STUDENT IN AN ORGANIZED HEALTH CARE EDUCATION/TRAINING PROGRAM

## 2025-06-04 PROCEDURE — 3078F DIAST BP <80 MM HG: CPT | Performed by: STUDENT IN AN ORGANIZED HEALTH CARE EDUCATION/TRAINING PROGRAM

## 2025-06-04 PROCEDURE — 85025 COMPLETE CBC W/AUTO DIFF WBC: CPT | Performed by: STUDENT IN AN ORGANIZED HEALTH CARE EDUCATION/TRAINING PROGRAM

## 2025-06-04 PROCEDURE — 96360 HYDRATION IV INFUSION INIT: CPT

## 2025-06-04 PROCEDURE — 25810000003 SODIUM CHLORIDE 0.9 % SOLUTION: Performed by: STUDENT IN AN ORGANIZED HEALTH CARE EDUCATION/TRAINING PROGRAM

## 2025-06-04 PROCEDURE — 83735 ASSAY OF MAGNESIUM: CPT | Performed by: STUDENT IN AN ORGANIZED HEALTH CARE EDUCATION/TRAINING PROGRAM

## 2025-06-04 PROCEDURE — 1126F AMNT PAIN NOTED NONE PRSNT: CPT | Performed by: STUDENT IN AN ORGANIZED HEALTH CARE EDUCATION/TRAINING PROGRAM

## 2025-06-04 PROCEDURE — 80053 COMPREHEN METABOLIC PANEL: CPT | Performed by: STUDENT IN AN ORGANIZED HEALTH CARE EDUCATION/TRAINING PROGRAM

## 2025-06-04 RX ORDER — HEPARIN SODIUM (PORCINE) LOCK FLUSH IV SOLN 100 UNIT/ML 100 UNIT/ML
500 SOLUTION INTRAVENOUS AS NEEDED
Status: DISCONTINUED | OUTPATIENT
Start: 2025-06-04 | End: 2025-06-05 | Stop reason: HOSPADM

## 2025-06-04 RX ORDER — ASPIRIN 81 MG/1
TABLET ORAL
COMMUNITY

## 2025-06-04 RX ORDER — SODIUM CHLORIDE 0.9 % (FLUSH) 0.9 %
20 SYRINGE (ML) INJECTION AS NEEDED
Status: CANCELLED | OUTPATIENT
Start: 2025-06-04

## 2025-06-04 RX ORDER — PRAVASTATIN SODIUM 20 MG
TABLET ORAL
COMMUNITY

## 2025-06-04 RX ORDER — HEPARIN SODIUM (PORCINE) LOCK FLUSH IV SOLN 100 UNIT/ML 100 UNIT/ML
500 SOLUTION INTRAVENOUS AS NEEDED
OUTPATIENT
Start: 2025-06-04

## 2025-06-04 RX ORDER — SODIUM CHLORIDE 0.9 % (FLUSH) 0.9 %
20 SYRINGE (ML) INJECTION AS NEEDED
Status: DISCONTINUED | OUTPATIENT
Start: 2025-06-04 | End: 2025-06-05 | Stop reason: HOSPADM

## 2025-06-04 RX ORDER — HEPARIN SODIUM (PORCINE) LOCK FLUSH IV SOLN 100 UNIT/ML 100 UNIT/ML
500 SOLUTION INTRAVENOUS AS NEEDED
Status: CANCELLED | OUTPATIENT
Start: 2025-06-04

## 2025-06-04 RX ORDER — CLONIDINE HYDROCHLORIDE 0.1 MG/1
TABLET ORAL
COMMUNITY

## 2025-06-04 RX ORDER — SODIUM CHLORIDE 0.9 % (FLUSH) 0.9 %
20 SYRINGE (ML) INJECTION AS NEEDED
OUTPATIENT
Start: 2025-06-04

## 2025-06-04 RX ADMIN — HEPARIN 500 UNITS: 100 SYRINGE at 14:37

## 2025-06-04 RX ADMIN — SODIUM CHLORIDE 1000 ML: 900 INJECTION, SOLUTION INTRAVENOUS at 13:30

## 2025-06-04 RX ADMIN — Medication 20 ML: at 12:06

## 2025-06-04 RX ADMIN — Medication 20 ML: at 14:37

## 2025-06-04 NOTE — PROGRESS NOTES
Pt here for labs, a f/u visit with a provider, and infusion. Port accessed and flushed with good blood return noted. 10cc of blood wasted prior to specimen collection. Blood specimen obtained and sent to lab for processing per protocol. Port flushed with NS, saline locked, and curos caps placed. Pt sent back to waiting room and [facility] staff notified.

## 2025-06-13 ENCOUNTER — HOSPITAL ENCOUNTER (EMERGENCY)
Facility: HOSPITAL | Age: 70
Discharge: HOME | End: 2025-06-13
Attending: EMERGENCY MEDICINE
Payer: MEDICARE

## 2025-06-13 VITALS
WEIGHT: 158.8 LBS | RESPIRATION RATE: 20 BRPM | HEART RATE: 69 BPM | TEMPERATURE: 97.9 F | OXYGEN SATURATION: 96 % | BODY MASS INDEX: 29.22 KG/M2 | HEIGHT: 62 IN | SYSTOLIC BLOOD PRESSURE: 139 MMHG | DIASTOLIC BLOOD PRESSURE: 77 MMHG

## 2025-06-13 DIAGNOSIS — R60.0 BILATERAL LOWER EXTREMITY EDEMA: Primary | ICD-10-CM

## 2025-06-13 LAB
ANION GAP SERPL CALCULATED.3IONS-SCNC: 11.2 MMOL/L (ref 5–15)
BASOPHILS # BLD AUTO: 0.07 10*3/MM3 (ref 0–0.2)
BASOPHILS NFR BLD AUTO: 1.1 % (ref 0–1.5)
BUN SERPL-MCNC: 7.7 MG/DL (ref 8–23)
BUN/CREAT SERPL: 14.3 (ref 7–25)
CALCIUM SPEC-SCNC: 9.2 MG/DL (ref 8.6–10.5)
CHLORIDE SERPL-SCNC: 108 MMOL/L (ref 98–107)
CO2 SERPL-SCNC: 22.8 MMOL/L (ref 22–29)
CREAT SERPL-MCNC: 0.54 MG/DL (ref 0.57–1)
DEPRECATED RDW RBC AUTO: 55.7 FL (ref 37–54)
EGFRCR SERPLBLD CKD-EPI 2021: 99.2 ML/MIN/1.73
EOSINOPHIL # BLD AUTO: 0.2 10*3/MM3 (ref 0–0.4)
EOSINOPHIL NFR BLD AUTO: 3.1 % (ref 0.3–6.2)
ERYTHROCYTE [DISTWIDTH] IN BLOOD BY AUTOMATED COUNT: 14.7 % (ref 12.3–15.4)
GLUCOSE SERPL-MCNC: 102 MG/DL (ref 65–99)
HCT VFR BLD AUTO: 34.7 % (ref 34–46.6)
HGB BLD-MCNC: 10.1 G/DL (ref 12–15.9)
HOLD SPECIMEN: NORMAL
IMM GRANULOCYTES # BLD AUTO: 0.01 10*3/MM3 (ref 0–0.05)
IMM GRANULOCYTES NFR BLD AUTO: 0.2 % (ref 0–0.5)
LYMPHOCYTES # BLD AUTO: 3.19 10*3/MM3 (ref 0.7–3.1)
LYMPHOCYTES NFR BLD AUTO: 48.9 % (ref 19.6–45.3)
MCH RBC QN AUTO: 30.1 PG (ref 26.6–33)
MCHC RBC AUTO-ENTMCNC: 29.1 G/DL (ref 31.5–35.7)
MCV RBC AUTO: 103.3 FL (ref 79–97)
MONOCYTES # BLD AUTO: 0.57 10*3/MM3 (ref 0.1–0.9)
MONOCYTES NFR BLD AUTO: 8.7 % (ref 5–12)
NEUTROPHILS NFR BLD AUTO: 2.48 10*3/MM3 (ref 1.7–7)
NEUTROPHILS NFR BLD AUTO: 38 % (ref 42.7–76)
NRBC BLD AUTO-RTO: 0 /100 WBC (ref 0–0.2)
NT-PROBNP SERPL-MCNC: 721 PG/ML (ref 0–900)
PLATELET # BLD AUTO: 192 10*3/MM3 (ref 140–450)
PMV BLD AUTO: 8.9 FL (ref 6–12)
POTASSIUM SERPL-SCNC: 4.1 MMOL/L (ref 3.5–5.2)
RBC # BLD AUTO: 3.36 10*6/MM3 (ref 3.77–5.28)
SODIUM SERPL-SCNC: 142 MMOL/L (ref 136–145)
WBC NRBC COR # BLD AUTO: 6.52 10*3/MM3 (ref 3.4–10.8)
WHOLE BLOOD HOLD COAG: NORMAL

## 2025-06-13 PROCEDURE — 36415 COLL VENOUS BLD VENIPUNCTURE: CPT

## 2025-06-13 PROCEDURE — 83880 ASSAY OF NATRIURETIC PEPTIDE: CPT | Performed by: EMERGENCY MEDICINE

## 2025-06-13 PROCEDURE — 85025 COMPLETE CBC W/AUTO DIFF WBC: CPT | Performed by: EMERGENCY MEDICINE

## 2025-06-13 PROCEDURE — 80048 BASIC METABOLIC PNL TOTAL CA: CPT | Performed by: EMERGENCY MEDICINE

## 2025-06-13 PROCEDURE — 99283 EMERGENCY DEPT VISIT LOW MDM: CPT

## 2025-06-13 RX ORDER — SODIUM CHLORIDE 0.9 % (FLUSH) 0.9 %
10 SYRINGE (ML) INJECTION AS NEEDED
Status: DISCONTINUED | OUTPATIENT
Start: 2025-06-13 | End: 2025-06-13 | Stop reason: HOSPADM

## 2025-06-13 NOTE — ED PROVIDER NOTES
Subjective   History of Present Illness  Chief complaint: Lower extremity swelling    70-year-old female presents with lower extremity swelling.  Patient states symptoms have been present over the past 2 or 3 days.  Patient reports a history of lymphoma and states she was recently told that she was cancer free.  She states on her own she decided to stop taking allopurinol, acyclovir, carvedilol, Jardiance.  She felt like they were making her tired and sluggish all the time.  She states she has felt better since stopping the medicine.  Then over the past 2 or 3 days she has developed this lower extremity swelling.  She states she has continued to feel well.  She denies any chest pain or shortness of breath.  She has not noticed any redness or warmth to her legs.  She states she has never had any heart problems in the past.  She has never had swelling in her legs before.    History provided by:  Patient      Review of Systems   Constitutional:  Negative for fever.   HENT:  Negative for congestion.    Respiratory:  Negative for cough and shortness of breath.    Cardiovascular:  Positive for leg swelling. Negative for chest pain.   Gastrointestinal:  Negative for abdominal pain and vomiting.   Musculoskeletal:  Negative for back pain.   Neurological:  Negative for headaches.       Past Medical History:   Diagnosis Date    Drug therapy     Hyperlipidemia     Hypertension     Non Hodgkin's lymphoma 2009       Allergies   Allergen Reactions    Sulfamethoxazole-Trimethoprim Swelling       Past Surgical History:   Procedure Laterality Date    BREAST BIOPSY      COLONOSCOPY N/A 5/2/2025    Procedure: COLONOSCOPY with POLYPECTOMY;  Surgeon: CARINA Valle MD;  Location: [facility] ENDOSCOPY;  Service: Gastroenterology;  Laterality: N/A;  COLON POLYP, HEMORRHOIDS, DIVERTICULOSIS    HYSTERECTOMY         Family History   Problem Relation Age of Onset    Liver cancer Brother     Breast cancer Maternal Aunt        Social History  "    Socioeconomic History    Marital status:    Tobacco Use    Smoking status: Never     Passive exposure: Never    Smokeless tobacco: Never   Vaping Use    Vaping status: Never Used   Substance and Sexual Activity    Alcohol use: Not Currently    Drug use: Never    Sexual activity: Defer       /82 (BP Location: Left arm, Patient Position: Sitting)   Pulse 85   Temp 97.9 °F (36.6 °C) (Oral)   Resp 20   Ht 157.5 cm (62\")   Wt 72 kg (158 lb 12.8 oz)   SpO2 97%   BMI 29.04 kg/m²       Objective   Physical Exam  Vitals and nursing note reviewed.   Constitutional:       Appearance: Normal appearance.   HENT:      Head: Normocephalic and atraumatic.      Mouth/Throat:      Mouth: Mucous membranes are moist.   Cardiovascular:      Rate and Rhythm: Normal rate and regular rhythm.      Heart sounds: Normal heart sounds.   Pulmonary:      Effort: Pulmonary effort is normal. No respiratory distress.      Breath sounds: Normal breath sounds.   Abdominal:      Palpations: Abdomen is soft.      Tenderness: There is no abdominal tenderness.   Musculoskeletal:      Comments: Symmetric mild bilateral lower extremity edema.  There is no erythema or warmth.  Neurovascular intact distally.  There is no calf tenderness.   Skin:     General: Skin is warm and dry.   Neurological:      Mental Status: She is alert and oriented to person, place, and time.         Procedures           ED Course      Results for orders placed or performed during the hospital encounter of 06/13/25   Basic Metabolic Panel    Collection Time: 06/13/25  6:10 PM    Specimen: Arm, Right; Blood   Result Value Ref Range    Glucose 102 (H) 65 - 99 mg/dL    BUN 7.7 (L) 8.0 - 23.0 mg/dL    Creatinine 0.54 (L) 0.57 - 1.00 mg/dL    Sodium 142 136 - 145 mmol/L    Potassium 4.1 3.5 - 5.2 mmol/L    Chloride 108 (H) 98 - 107 mmol/L    CO2 22.8 22.0 - 29.0 mmol/L    Calcium 9.2 8.6 - 10.5 mg/dL    BUN/Creatinine Ratio 14.3 7.0 - 25.0    Anion Gap 11.2 5.0 - " 15.0 mmol/L    eGFR 99.2 >60.0 mL/min/1.73   BNP    Collection Time: 06/13/25  6:10 PM    Specimen: Arm, Right; Blood   Result Value Ref Range    proBNP 721.0 0.0 - 900.0 pg/mL   CBC Auto Differential    Collection Time: 06/13/25  6:10 PM    Specimen: Arm, Right; Blood   Result Value Ref Range    WBC 6.52 3.40 - 10.80 10*3/mm3    RBC 3.36 (L) 3.77 - 5.28 10*6/mm3    Hemoglobin 10.1 (L) 12.0 - 15.9 g/dL    Hematocrit 34.7 34.0 - 46.6 %    .3 (H) 79.0 - 97.0 fL    MCH 30.1 26.6 - 33.0 pg    MCHC 29.1 (L) 31.5 - 35.7 g/dL    RDW 14.7 12.3 - 15.4 %    RDW-SD 55.7 (H) 37.0 - 54.0 fl    MPV 8.9 6.0 - 12.0 fL    Platelets 192 140 - 450 10*3/mm3    Neutrophil % 38.0 (L) 42.7 - 76.0 %    Lymphocyte % 48.9 (H) 19.6 - 45.3 %    Monocyte % 8.7 5.0 - 12.0 %    Eosinophil % 3.1 0.3 - 6.2 %    Basophil % 1.1 0.0 - 1.5 %    Immature Grans % 0.2 0.0 - 0.5 %    Neutrophils, Absolute 2.48 1.70 - 7.00 10*3/mm3    Lymphocytes, Absolute 3.19 (H) 0.70 - 3.10 10*3/mm3    Monocytes, Absolute 0.57 0.10 - 0.90 10*3/mm3    Eosinophils, Absolute 0.20 0.00 - 0.40 10*3/mm3    Basophils, Absolute 0.07 0.00 - 0.20 10*3/mm3    Immature Grans, Absolute 0.01 0.00 - 0.05 10*3/mm3    nRBC 0.0 0.0 - 0.2 /100 WBC   Gold Top - SST    Collection Time: 06/13/25  6:10 PM   Result Value Ref Range    Extra Tube Hold for add-ons.    Light Blue Top    Collection Time: 06/13/25  6:10 PM   Result Value Ref Range    Extra Tube Hold for add-ons.                                                       Medical Decision Making  Amount and/or Complexity of Data Reviewed  Labs: ordered.    Risk  Prescription drug management.      Patient had the above evaluation.  Results were discussed with the patient.  White blood cell count is normal.  BMP is unremarkable.  BNP is normal.  Patient is well-appearing in the emergency room.  She was advised to use compression stockings and to elevate legs is much as possible.  She states she has an appointment coming up with her  cardiologist.  She is stable for discharge.      Final diagnoses:   Bilateral lower extremity edema       ED Disposition  ED Disposition       ED Disposition   Discharge    Condition   Stable    Comment   --               Bozena Alamo, APRN  [address] [facility] [place] [facility]  [phone]    Call in 2 days           Medication List      No changes were made to your prescriptions during this visit.            Willie Kirkpatrick MD  06/13/25 0434

## 2025-06-13 NOTE — DISCHARGE INSTRUCTIONS
Follow-up with your primary provider as well as your cardiologist.  Return to the emergency room for any new or worsening symptoms or if you have any other questions or concerns.  Use compression stockings and elevate legs is much as possible.

## 2025-06-19 NOTE — PROGRESS NOTES
Subjective:     Encounter Date:06/20/2025        Patient ID: Catarina Mclean 1955     Chief Complaint:  6 month follow-up     History of Present Illness:  Catarina Mclean is a 70 y.o. female with a history of DLBCL with previous anthracycline based chemotherapy who was diagnosed with non-Hodgkin's lymphoma last year. She underwent chemotherapy. Today she presents to the office for 6 month follow-up. She states that she is now cancer free x 3 weeks. She states approximately 1 week ago she went to the ER at [facility] due to increasing bilateral lower extremity edema. She states her workup was unremarkable. She was advised to use compression stockings, elevate legs as much as possible, and follow-up with cardiology. Today she states she feels great. She states her swelling has resolved. She denies any chest pain or shortness of breath. She states last week she stopped taking carvedilol due to extreme fatigue and brain fog. She states she feels much better off carvedilol. She states her blood pressure has been well controlled.       Current cardiac medications include:  amlodipine, aspirin, lisinopril and atorvastatin      Review of systems:  Review of Systems   Constitutional: Positive for malaise/fatigue.   Cardiovascular:  Negative for chest pain, dyspnea on exertion, leg swelling and palpitations.   Respiratory:  Negative for cough and shortness of breath.    Gastrointestinal:  Negative for abdominal pain, nausea and vomiting.   Neurological:  Positive for weakness (Right leg). Negative for dizziness, headaches, light-headedness and numbness.   All other systems reviewed and are negative.        The following portions of the patient's history were reviewed and updated as appropriate: allergies, current medications, past family history, past medical history, past social history, past surgical history and problem list.    Past Medical History:  Past Medical History:   Diagnosis Date    Chronic  heart failure with preserved ejection fraction (HFpEF) 12/16/2024    Drug therapy     Hyperlipidemia     Hypertension     Non Hodgkin's lymphoma 2009       Past Surgical History:  Past Surgical History:   Procedure Laterality Date    BREAST BIOPSY      COLONOSCOPY N/A 5/2/2025    Procedure: COLONOSCOPY with POLYPECTOMY;  Surgeon: CARINA Valle MD;  Location: [facility] ENDOSCOPY;  Service: Gastroenterology;  Laterality: N/A;  COLON POLYP, HEMORRHOIDS, DIVERTICULOSIS    HYSTERECTOMY          Allergies:  Allergies   Allergen Reactions    Carvedilol Other (See Comments)     Extreme fatigue and brain fog    Sulfamethoxazole-Trimethoprim Swelling       Current Meds:     Current Outpatient Medications:     acyclovir (ZOVIRAX) 400 MG tablet, Take 1 tablet by mouth 2 (Two) Times a Day. Take no more than 5 doses a day., Disp: 60 tablet, Rfl: 5    allopurinol (ZYLOPRIM) 300 MG tablet, TAKE 1 TABLET BY MOUTH DAILY, Disp: 30 tablet, Rfl: 0    amLODIPine (NORVASC) 5 MG tablet, Take 1 tablet by mouth Daily., Disp: , Rfl:     aspirin 81 MG EC tablet, Take 1 tablet by mouth Daily., Disp: , Rfl:     lidocaine-prilocaine (EMLA) 2.5-2.5 % cream, Apply 1 Application topically to the appropriate area as directed As Needed (apply 1 hour prior to port access)., Disp: 30 g, Rfl: 2    lisinopril (PRINIVIL,ZESTRIL) 20 MG tablet, Take 1 tablet by mouth Daily., Disp: , Rfl:     Omega-3 Fatty Acids (fish oil) 1000 MG capsule capsule, Take 2 capsules by mouth Daily With Breakfast., Disp: , Rfl:     pravastatin (PRAVACHOL) 20 MG tablet, Take 1 tablet by mouth Daily., Disp: , Rfl:     Psyllium (METAMUCIL MULTIHEALTH FIBER) 51.7 % packet, Take 1 packet by mouth Daily., Disp: , Rfl:     traZODone (DESYREL) 50 MG tablet, Take 1.5 tablets by mouth Every Night., Disp: , Rfl:     Social History:   Social History     Tobacco Use    Smoking status: Never     Passive exposure: Never    Smokeless tobacco: Never   Substance Use Topics    Alcohol use: Not  "Currently        Family History:  Family History   Problem Relation Age of Onset    Liver cancer Brother     Breast cancer Maternal Aunt                  Objective:       Physical Exam:  Vitals reviewed.   Constitutional:       Appearance: Well-developed and well-groomed.   Eyes:      Conjunctiva/sclera: Conjunctivae normal.      Pupils: Pupils are equal, round, and reactive to light.   HENT:      Head: Normocephalic and atraumatic.    Mouth/Throat:      Mouth: Mucous membranes are moist.      Pharynx: Oropharynx is clear.   Pulmonary:      Effort: Pulmonary effort is normal.      Breath sounds: Normal breath sounds.   Cardiovascular:      PMI at left midclavicular line. Normal rate. Regular rhythm.      Murmurs: There is no murmur.   Pulses:     Intact distal pulses.   Edema:     Peripheral edema absent.   Abdominal:      General: Bowel sounds are normal.      Palpations: Abdomen is soft.   Musculoskeletal: Normal range of motion.      Cervical back: Normal range of motion and neck supple. Skin:     General: Skin is warm and dry.   Neurological:      Mental Status: Alert and oriented to person, place, and time.   Psychiatric:         Mood and Affect: Mood normal.         Behavior: Behavior normal.         Vital signs:  /87 (BP Location: Left arm, Patient Position: Sitting, Cuff Size: Adult)   Pulse 77   Ht 157.5 cm (62\")   Wt 71.2 kg (157 lb)   SpO2 95%   BMI 28.72 kg/m²       Recent labs reviewed:    CBC  Results from last 7 days   Lab Units 06/13/25  1810   WBC 10*3/mm3 6.52   RBC 10*6/mm3 3.36*   HEMOGLOBIN g/dL 10.1*   HEMATOCRIT % 34.7   MCV fL 103.3*   PLATELETS 10*3/mm3 192       BMP  Results from last 7 days   Lab Units 06/13/25  1810   SODIUM mmol/L 142   POTASSIUM mmol/L 4.1   CHLORIDE mmol/L 108*   CO2 mmol/L 22.8   BUN mg/dL 7.7*   CREATININE mg/dL 0.54*   GLUCOSE mg/dL 102*       CMP   Results from last 7 days   Lab Units 06/13/25  1810   SODIUM mmol/L 142   POTASSIUM mmol/L 4.1   CHLORIDE " mmol/L 108*   CO2 mmol/L 22.8   BUN mg/dL 7.7*   CREATININE mg/dL 0.54*   GLUCOSE mg/dL 102*       Creatinine Clearance  Estimated Creatinine Clearance: 89.5 mL/min (A) (by C-G formula based on SCr of 0.54 mg/dL (L)).    BNP        TROPONIN        CoAg          Cardiology results reviewed:    Echocardiogram  Results for orders placed during the hospital encounter of 12/16/24    Adult Transthoracic Echo Complete W/ Cont if Necessary Per Protocol    Interpretation Summary    Left ventricular ejection fraction appears to be 61 - 65%.    Left ventricular diastolic function is consistent with (grade Ia w/high LAP) impaired relaxation.    The left atrial cavity is dilated.    Estimated right ventricular systolic pressure from tricuspid regurgitation is normal (<35 mmHg).    No significant valvular abnormalities noted.    Extracardiac findings: Periportal lymph nodes are noted.      Cardiac catheterization  No results found for this or any previous visit.               Assessment:         Diagnoses and all orders for this visit:    1. Chronic heart failure with preserved ejection fraction (HFpEF) (Primary)    2. Essential hypertension    3. Diffuse large b-cell lymphoma, lymph nodes of head, face, and neck    4. Hyperlipidemia LDL goal <100    5. Overweight (BMI 25.0-29.9)                Plan:       Chronic HFpEF  Echocardiogram in Dec. 2024 shows preserved LV function, grade 1 diastolic dysfunction with dilated left atrium.   She reports recent BLE edema that spontaneously resolved.  The patient appears euvolemic today on exam.  Can use diuretics as needed.  She cannot afford SGLT2-I  Recommend daily weights and low sodium diet.      Diffuse large B-cell lymphoma, unspecified body region  Followed by oncology  She states she completed chemotherapy for non-Hodgkin's lymphoma and is now cancer-free     Primary hypertension  Blood pressure is slightly elevated in the office, but the patient reports whitecoat  hypertension.  Continue amlodipine and lisinopril   She quit taking carvedilol due to side effects of extreme fatigue and brain fot.   She was advised to monitor her blood pressure at home.     Mixed hyperlipidemia  Continue pravastatin  Goal LDL less than 100     Overweight  The patient's previous BMI was 30.6.    BMI today is 28.72. She weighs 157 lbs.  Continued lifestyle modifications recommended.       Electronically signed by NITIN Moctezuma, 06/20/25, 10:40 AM EDT.

## 2025-06-20 ENCOUNTER — OFFICE VISIT (OUTPATIENT)
Dept: CARDIOLOGY | Facility: CLINIC | Age: 70
End: 2025-06-20
Payer: MEDICARE

## 2025-06-20 VITALS
WEIGHT: 157 LBS | HEIGHT: 62 IN | HEART RATE: 77 BPM | BODY MASS INDEX: 28.89 KG/M2 | OXYGEN SATURATION: 95 % | SYSTOLIC BLOOD PRESSURE: 147 MMHG | DIASTOLIC BLOOD PRESSURE: 87 MMHG

## 2025-06-20 DIAGNOSIS — I50.32 CHRONIC HEART FAILURE WITH PRESERVED EJECTION FRACTION (HFPEF): Primary | Chronic | ICD-10-CM

## 2025-06-20 DIAGNOSIS — C83.31 DIFFUSE LARGE B-CELL LYMPHOMA, LYMPH NODES OF HEAD, FACE, AND NECK: ICD-10-CM

## 2025-06-20 DIAGNOSIS — E78.5 HYPERLIPIDEMIA LDL GOAL <100: Chronic | ICD-10-CM

## 2025-06-20 DIAGNOSIS — E66.3 OVERWEIGHT (BMI 25.0-29.9): ICD-10-CM

## 2025-06-20 DIAGNOSIS — I10 ESSENTIAL HYPERTENSION: Chronic | ICD-10-CM

## 2025-06-20 PROBLEM — H25.813 COMBINED FORMS OF AGE-RELATED CATARACT OF BOTH EYES: Status: ACTIVE | Noted: 2023-01-16

## 2025-07-30 ENCOUNTER — TELEPHONE (OUTPATIENT)
Dept: ONCOLOGY | Facility: CLINIC | Age: 70
End: 2025-07-30
Payer: MEDICARE

## 2025-07-30 NOTE — TELEPHONE ENCOUNTER
Recd [facility] [place] Application form from [employer] with a bill from [facility] [phone].  This account is for an ED visit for a drug reaction.  The ED visit is not due to cancer dx.  I called the patient and explained this.  She ask that I complete the application and she will pick it up.  I ask if she had a date that I can attach to the application because they ask for an itemized statement.  She said no.  I told her that I will leave the uncompleted form at the  and will glad to complete once she has a date that I can use to complete the form.  She v/u.

## (undated) DEVICE — PK ENDO GI 50

## (undated) DEVICE — SNAR POLYP CAPTIVATOR HEX 27MM 240CM

## (undated) DEVICE — TRAP WIDEEYE POLYP